# Patient Record
Sex: FEMALE | Race: WHITE | NOT HISPANIC OR LATINO | Employment: OTHER | URBAN - METROPOLITAN AREA
[De-identification: names, ages, dates, MRNs, and addresses within clinical notes are randomized per-mention and may not be internally consistent; named-entity substitution may affect disease eponyms.]

---

## 2017-01-09 ENCOUNTER — HOSPITAL ENCOUNTER (OUTPATIENT)
Dept: INFUSION THERAPY | Facility: HOSPITAL | Age: 62
Discharge: HOME | End: 2017-01-09
Admitting: PLASTIC SURGERY

## 2017-01-09 VITALS
TEMPERATURE: 97.5 F | SYSTOLIC BLOOD PRESSURE: 118 MMHG | DIASTOLIC BLOOD PRESSURE: 68 MMHG | HEART RATE: 95 BPM | OXYGEN SATURATION: 98 % | RESPIRATION RATE: 16 BRPM

## 2017-01-09 DIAGNOSIS — M67.441 GANGLION CYST OF FINGER OF RIGHT HAND: ICD-10-CM

## 2017-01-09 PROCEDURE — 88304 TISSUE EXAM BY PATHOLOGIST: CPT | Performed by: PLASTIC SURGERY

## 2017-01-09 RX ORDER — LIDOCAINE HYDROCHLORIDE 10 MG/ML
20 INJECTION, SOLUTION INFILTRATION; PERINEURAL ONCE
Status: COMPLETED | OUTPATIENT
Start: 2017-01-09 | End: 2017-01-09

## 2017-01-09 RX ADMIN — LIDOCAINE HYDROCHLORIDE 4.5 ML: 10 INJECTION, SOLUTION INFILTRATION; PERINEURAL at 15:54

## 2017-01-09 NOTE — PATIENT INSTRUCTIONS
MINOR SURGERY DISCHARGE INSTRUCTIONS    IMPORTANT:  The following information will help you return to your best level of health.  Wound Care:  ·  Your dressing may be removed 4 days.    · No cosmetics to incision.  · You may use band aids after dressing is removed.    You may shower:  Tomorrow  Do not get dressing soaking wet.    Apply ice to area 30 minutes on and off for the rest of the day and possibly the next day if helpful.  Elevate area for 24-48 hours to reduce swelling.    If hand:  While walking keep hand higher than your elbow.  When resting keep your hand higher than your heart.      Notify your doctor if you have any of the following:  ·  Signs of infection including:  Fever (increase in temperature), redness at incision site, pus from  incision, red streaks around the incision.  ·  Increase in pain unrelieved by pain medication or Tylenol.  ·  If the part swells, gets cold, or numb.  ·  Large amounts of drainage or bleeding from incision.  Apply pressure constantly for 10 minutes.    Medications:   Following this procedure, you should continue to take all other medications as directed by your primary physician unless otherwise instructed. There have been no changes to the medications you provided us (with the following exceptions, if applicable):   You may use Tylenol or Advil for discomfort.  Activity:   You may increase your activity as tolerated.     You may resume normal activity:    Today  Tomorrow In ______ days   No strenuous activity, lifting or sports Until seen by your Dr. Rober Braga    Call your doctor to make an appointment for:    On (date) 1/23/17  Dr. Castro  Office # [phone]; Hydrocodone tablets or capsules  What is this medicine?  ACETAMINOPHEN; HYDROCODONE (a set a STEPHANIE janelle fen; melanie droe KOE done) is a pain reliever. It is used to treat moderate to severe pain.  This medicine may be used for other purposes; ask your health care provider or pharmacist if you  have questions.  What should I tell my health care provider before I take this medicine?  They need to know if you have any of these conditions:  -brain tumor  -Crohn's disease, inflammatory bowel disease, or ulcerative colitis  -drug abuse or addiction  -head injury  -heart or circulation problems  -if you often drink alcohol  -kidney disease or problems going to the bathroom  -liver disease  -lung disease, asthma, or breathing problems  -an unusual or allergic reaction to acetaminophen, hydrocodone, other opioid analgesics, other medicines, foods, dyes, or preservatives  -pregnant or trying to get pregnant  -breast-feeding  How should I use this medicine?  Take this medicine by mouth. Swallow it with a full glass of water. Follow the directions on the prescription label. If the medicine upsets your stomach, take the medicine with food or milk. Do not take more than you are told to take.  Talk to your pediatrician regarding the use of this medicine in children. This medicine is not approved for use in children.  Patients over 65 years may have a stronger reaction and need a smaller dose.  Overdosage: If you think you have taken too much of this medicine contact a poison control center or emergency room at once.  NOTE: This medicine is only for you. Do not share this medicine with others.  What if I miss a dose?  If you miss a dose, take it as soon as you can. If it is almost time for your next dose, take only that dose. Do not take double or extra doses.  What may interact with this medicine?  -alcohol  -antihistamines  -isoniazid  -medicines for depression, anxiety, or psychotic disturbances  -medicines for sleep  -muscle relaxants  -naltrexone  -narcotic medicines (opiates) for pain  -phenobarbital  -ritonavir  -tramadol  This list may not describe all possible interactions. Give your health care provider a list of all the medicines, herbs, non-prescription drugs, or dietary supplements you use. Also tell them if  you smoke, drink alcohol, or use illegal drugs. Some items may interact with your medicine.  What should I watch for while using this medicine?  Tell your doctor or health care professional if your pain does not go away, if it gets worse, or if you have new or a different type of pain. You may develop tolerance to the medicine. Tolerance means that you will need a higher dose of the medicine for pain relief. Tolerance is normal and is expected if you take the medicine for a long time.  Do not suddenly stop taking your medicine because you may develop a severe reaction. Your body becomes used to the medicine. This does NOT mean you are addicted. Addiction is a behavior related to getting and using a drug for a non-medical reason. If you have pain, you have a medical reason to take pain medicine. Your doctor will tell you how much medicine to take. If your doctor wants you to stop the medicine, the dose will be slowly lowered over time to avoid any side effects.  You may get drowsy or dizzy when you first start taking the medicine or change doses. Do not drive, use machinery, or do anything that may be dangerous until you know how the medicine affects you. Stand or sit up slowly.  There are different types of narcotic medicines (opiates) for pain. If you take more than one type at the same time, you may have more side effects. Give your health care provider a list of all medicines you use. Your doctor will tell you how much medicine to take. Do not take more medicine than directed. Call emergency for help if you have problems breathing.  The medicine will cause constipation. Try to have a bowel movement at least every 2 to 3 days. If you do not have a bowel movement for 3 days, call your doctor or health care professional.  Too much acetaminophen can be very dangerous. Do not take Tylenol (acetaminophen) or medicines that contain acetaminophen with this medicine. Many non-prescription medicines contain acetaminophen.  Always read the labels carefully.  What side effects may I notice from receiving this medicine?  Side effects that you should report to your doctor or health care professional as soon as possible:  -allergic reactions like skin rash, itching or hives, swelling of the face, lips, or tongue  -breathing problems  -confusion  -feeling faint or lightheaded, falls  -stomach pain  -yellowing of the eyes or skin  Side effects that usually do not require medical attention (report to your doctor or health care professional if they continue or are bothersome):  -nausea, vomiting  -stomach upset  This list may not describe all possible side effects. Call your doctor for medical advice about side effects. You may report side effects to FDA at 4-327-FDA-7738.  Where should I keep my medicine?  Keep out of the reach of children. This medicine can be abused. Keep your medicine in a safe place to protect it from theft. Do not share this medicine with anyone. Selling or giving away this medicine is dangerous and against the law.  This medicine may cause accidental overdose and death if it taken by other adults, children, or pets. Mix any unused medicine with a substance like cat litter or coffee grounds. Then throw the medicine away in a sealed container like a sealed bag or a coffee can with a lid. Do not use the medicine after the expiration date.  Store at room temperature between 15 and 30 degrees C (59 and 86 degrees F).  NOTE: This sheet is a summary. It may not cover all possible information. If you have questions about this medicine, talk to your doctor, pharmacist, or health care provider.     © 2016, [employer]vier/[employer] Standard. (2015-11-18 15:29:20)

## 2017-01-09 NOTE — IP AVS SNAPSHOT
Gwendolyn Huddleston   1/9/2017  4:00 PM   Surgery    Provider:  ANGELICA GRANADOS [facility]   Department:  [facility]   Dept Phone:  [phone]                Your Full Care Plan           Instructions    MINOR SURGERY DISCHARGE INSTRUCTIONS    IMPORTANT:  The following information will help you return to your best level of health.  Wound Care:  ·  Your dressing may be removed 4 days.    · No cosmetics to incision.  · You may use band aids after dressing is removed.    You may shower:  Tomorrow  Do not get dressing soaking wet.    Apply ice to area 30 minutes on and off for the rest of the day and possibly the next day if helpful.  Elevate area for 24-48 hours to reduce swelling.    If hand:  While walking keep hand higher than your elbow.  When resting keep your hand higher than your heart.      Notify your doctor if you have any of the following:  ·  Signs of infection including:  Fever (increase in temperature), redness at incision site, pus from  incision, red streaks around the incision.  ·  Increase in pain unrelieved by pain medication or Tylenol.  ·  If the part swells, gets cold, or numb.  ·  Large amounts of drainage or bleeding from incision.  Apply pressure constantly for 10 minutes.    Medications:   Following this procedure, you should continue to take all other medications as directed by your primary physician unless otherwise instructed. There have been no changes to the medications you provided us (with the following exceptions, if applicable):   You may use Tylenol or Advil for discomfort.  Activity:   You may increase your activity as tolerated.     You may resume normal activity:    Today  Tomorrow In ______ days   No strenuous activity, lifting or sports Until seen by your Dr. Rober Braga    Call your doctor to make an appointment for:    On (date) 1/23/17  Dr. Castro  Office # [phone]; Hydrocodone tablets or capsules  What is this  medicine?  ACETAMINOPHEN; HYDROCODONE (a set a STEPHANIE janelle fen; melanie droe KOE done) is a pain reliever. It is used to treat moderate to severe pain.  This medicine may be used for other purposes; ask your health care provider or pharmacist if you have questions.  What should I tell my health care provider before I take this medicine?  They need to know if you have any of these conditions:  -brain tumor  -Crohn's disease, inflammatory bowel disease, or ulcerative colitis  -drug abuse or addiction  -head injury  -heart or circulation problems  -if you often drink alcohol  -kidney disease or problems going to the bathroom  -liver disease  -lung disease, asthma, or breathing problems  -an unusual or allergic reaction to acetaminophen, hydrocodone, other opioid analgesics, other medicines, foods, dyes, or preservatives  -pregnant or trying to get pregnant  -breast-feeding  How should I use this medicine?  Take this medicine by mouth. Swallow it with a full glass of water. Follow the directions on the prescription label. If the medicine upsets your stomach, take the medicine with food or milk. Do not take more than you are told to take.  Talk to your pediatrician regarding the use of this medicine in children. This medicine is not approved for use in children.  Patients over 65 years may have a stronger reaction and need a smaller dose.  Overdosage: If you think you have taken too much of this medicine contact a poison control center or emergency room at once.  NOTE: This medicine is only for you. Do not share this medicine with others.  What if I miss a dose?  If you miss a dose, take it as soon as you can. If it is almost time for your next dose, take only that dose. Do not take double or extra doses.  What may interact with this medicine?  -alcohol  -antihistamines  -isoniazid  -medicines for depression, anxiety, or psychotic disturbances  -medicines for sleep  -muscle relaxants  -naltrexone  -narcotic medicines (opiates)  for pain  -phenobarbital  -ritonavir  -tramadol  This list may not describe all possible interactions. Give your health care provider a list of all the medicines, herbs, non-prescription drugs, or dietary supplements you use. Also tell them if you smoke, drink alcohol, or use illegal drugs. Some items may interact with your medicine.  What should I watch for while using this medicine?  Tell your doctor or health care professional if your pain does not go away, if it gets worse, or if you have new or a different type of pain. You may develop tolerance to the medicine. Tolerance means that you will need a higher dose of the medicine for pain relief. Tolerance is normal and is expected if you take the medicine for a long time.  Do not suddenly stop taking your medicine because you may develop a severe reaction. Your body becomes used to the medicine. This does NOT mean you are addicted. Addiction is a behavior related to getting and using a drug for a non-medical reason. If you have pain, you have a medical reason to take pain medicine. Your doctor will tell you how much medicine to take. If your doctor wants you to stop the medicine, the dose will be slowly lowered over time to avoid any side effects.  You may get drowsy or dizzy when you first start taking the medicine or change doses. Do not drive, use machinery, or do anything that may be dangerous until you know how the medicine affects you. Stand or sit up slowly.  There are different types of narcotic medicines (opiates) for pain. If you take more than one type at the same time, you may have more side effects. Give your health care provider a list of all medicines you use. Your doctor will tell you how much medicine to take. Do not take more medicine than directed. Call emergency for help if you have problems breathing.  The medicine will cause constipation. Try to have a bowel movement at least every 2 to 3 days. If you do not have a bowel movement for 3 days,  call your doctor or health care professional.  Too much acetaminophen can be very dangerous. Do not take Tylenol (acetaminophen) or medicines that contain acetaminophen with this medicine. Many non-prescription medicines contain acetaminophen. Always read the labels carefully.  What side effects may I notice from receiving this medicine?  Side effects that you should report to your doctor or health care professional as soon as possible:  -allergic reactions like skin rash, itching or hives, swelling of the face, lips, or tongue  -breathing problems  -confusion  -feeling faint or lightheaded, falls  -stomach pain  -yellowing of the eyes or skin  Side effects that usually do not require medical attention (report to your doctor or health care professional if they continue or are bothersome):  -nausea, vomiting  -stomach upset  This list may not describe all possible side effects. Call your doctor for medical advice about side effects. You may report side effects to FDA at 6-554-FDA-8654.  Where should I keep my medicine?  Keep out of the reach of children. This medicine can be abused. Keep your medicine in a safe place to protect it from theft. Do not share this medicine with anyone. Selling or giving away this medicine is dangerous and against the law.  This medicine may cause accidental overdose and death if it taken by other adults, children, or pets. Mix any unused medicine with a substance like cat litter or coffee grounds. Then throw the medicine away in a sealed container like a sealed bag or a coffee can with a lid. Do not use the medicine after the expiration date.  Store at room temperature between 15 and 30 degrees C (59 and 86 degrees F).  NOTE: This sheet is a summary. It may not cover all possible information. If you have questions about this medicine, talk to your doctor, pharmacist, or health care provider.     © 2016, Elsevier/Gold Standard. (2015-11-18 15:29:20)            Your Updated Medication List       ASK your doctor about these medications     levothyroxine 88 MCG tablet   Commonly known as:  SYNTHROID, LEVOTHROID   Take 1 tablet by mouth daily.       LORazepam 0.5 MG tablet   Commonly known as:  ATIVAN   Take 1 tablet by mouth Daily As Needed for anxiety.               [employer] Signup     [facility] IPXI allows you to send messages to your doctor, view your test results, renew your prescriptions, schedule appointments, and more. To sign up, go to [employer] and click on the Sign Up Now link in the New User? box. Enter your IPXI Activation Code exactly as it appears below along with the last four digits of your Social Security Number and your Date of Birth () to complete the sign-up process. If you do not sign up before the expiration date, you must request a new code.    IPXI Activation Code: [email or web address]  Expires: 2017  4:16 PM    If you have questions, you can email [email or web address] or call [phone] to talk to our IPXI staff. Remember, IPXI is NOT to be used for urgent needs. For medical emergencies, dial 911.               Other Info from Your Visit           Allergies     Penicillins       Reason for Visit     Procedure           Vital Signs     Blood Pressure Pulse Temperature Respirations Oxygen Saturation Smoking Status    118/68 (BP Location: Left arm, Patient Position: Sitting, Cuff Size: Adult) 95 97.5 °F (36.4 °C) (Oral) 16 98% Former Smoker      Medications Administered     lidocaine (XYLOCAINE) 1 % injection 20 mL                    Discharge Instructions     None         SYMPTOMS OF A STROKE    Call 911 or have someone take you to the Emergency Department if you have any of the following:    · Sudden numbness or weakness of your face, arm or leg especially on one side of the body  · Sudden confusion, diffiiculty speaking or trouble understanding   · Changes in your vision or loss of sight in one eye  · Sudden severe headache with  no known cause  · sudden dizziness, trouble walking, loss of balance or coordination    It is important to seek emergency care right away if you have further stroke symptoms. If you get emergency help quickly, the powerful clot-dissolving medicines can reduce the disabilities caused by a stroke.     For more information:    American Stroke Association  7-022-0-STROKE  www.strokeassociation.org           IF YOU SMOKE OR USE TOBACCO PLEASE READ THE FOLLOWING:    Why is smoking bad for me?  Smoking increases the risk of heart disease, lung disease, vascular disease, stroke, and cancer.     If you smoke, STOP!    If you would like more information on quitting smoking, please visit the [email or web address] website: [email or web address]   This link will provide additional resources including the QUIT line and the [employer] the [employer] support groups.     For more information:    American Cancer Society  [phone] 520-2119    American Heart Association  1-461.970.5072

## 2017-01-11 LAB
CYTO UR: NORMAL
LAB AP CASE REPORT: NORMAL
Lab: NORMAL
PATH REPORT.FINAL DX SPEC: NORMAL
PATH REPORT.GROSS SPEC: NORMAL

## 2017-01-13 NOTE — OP NOTE
Date of Procedure:  01/09/2017     INDICATION: The patient is a 61-year-old lady with a painful ganglion cyst on the dorsum of the distal interphalangeal joint of the right middle finger.     SURGEON: Dwayne Castro MD     ANESTHESIA: Xylocaine 1% plain as a digital block to the right middle finger.     PREOPERATIVE DIAGNOSIS: Ganglion cyst, right middle finger distal interphalangeal joint.     PROCEDURE PERFORMED: Excision of ganglion cyst, right middle finger, 0.5 cm.     DESCRIPTION OF PROCEDURE: Following installation of the digital block good anesthesia was achieved. The hand was prepped with Hibiclens and draped in a sterile fashion. A Tourni-cot was rolled onto the right middle finger to exsanguinate it and provide a bloodless field. A Clarence incision was made on the dorsum of the DIP joint and the skin dissected away from the ganglion cyst which was then excised from the germinal matrix. The base of the wound around the DIP joint was cauterized and care was taken not to damage the germinal matrix. The skin was closed with 5-0 nylon and dressed with bacitracin ointment, sterile gauze, and Elastikon tape.     PLAN: Home today with the hand elevated and instructions to leave the dressings intact and undisturbed and keep dry. Follow up in the office next week.       Dwayne Castro M.D.  GD:ab  D:   01/13/2017 11:04:21  T:   01/13/2017 18:09:52  Job ID:   [id number]  Document ID:   [id number]  cc:

## 2017-04-24 RX ORDER — LEVOTHYROXINE SODIUM 88 MCG
TABLET ORAL
Qty: 90 TABLET | Refills: 1 | Status: SHIPPED | OUTPATIENT
Start: 2017-04-24 | End: 2017-06-16 | Stop reason: SDUPTHER

## 2017-05-30 ENCOUNTER — OFFICE VISIT (OUTPATIENT)
Dept: FAMILY MEDICINE CLINIC | Facility: CLINIC | Age: 62
End: 2017-05-30

## 2017-05-30 VITALS
WEIGHT: 133.4 LBS | SYSTOLIC BLOOD PRESSURE: 106 MMHG | OXYGEN SATURATION: 98 % | BODY MASS INDEX: 23.64 KG/M2 | HEART RATE: 72 BPM | DIASTOLIC BLOOD PRESSURE: 72 MMHG | TEMPERATURE: 97.5 F | HEIGHT: 63 IN

## 2017-05-30 DIAGNOSIS — R10.9 RIGHT SIDED ABDOMINAL PAIN: Primary | ICD-10-CM

## 2017-05-30 PROCEDURE — 99213 OFFICE O/P EST LOW 20 MIN: CPT | Performed by: FAMILY MEDICINE

## 2017-06-02 ENCOUNTER — OFFICE VISIT (OUTPATIENT)
Dept: RETAIL CLINIC | Facility: CLINIC | Age: 62
End: 2017-06-02

## 2017-06-02 VITALS
RESPIRATION RATE: 18 BRPM | DIASTOLIC BLOOD PRESSURE: 70 MMHG | OXYGEN SATURATION: 97 % | TEMPERATURE: 98.5 F | HEART RATE: 86 BPM | SYSTOLIC BLOOD PRESSURE: 108 MMHG

## 2017-06-02 DIAGNOSIS — B02.9 HERPES ZOSTER WITHOUT COMPLICATION: Primary | ICD-10-CM

## 2017-06-02 PROCEDURE — 99213 OFFICE O/P EST LOW 20 MIN: CPT | Performed by: NURSE PRACTITIONER

## 2017-06-02 RX ORDER — ACYCLOVIR 400 MG/1
400 TABLET ORAL 3 TIMES DAILY
Qty: 15 TABLET | Refills: 0 | Status: SHIPPED | OUTPATIENT
Start: 2017-06-02 | End: 2017-06-07

## 2017-06-02 NOTE — PROGRESS NOTES
Subjective:     Gwendolyn Huddleston is a 61 y.o.     Rash   This is a new problem. The current episode started 1 to 4 weeks ago. The affected locations include the chest (right side). The rash is characterized by blistering. Associated symptoms include rhinorrhea. Pertinent negatives include no congestion, cough, diarrhea, fever, shortness of breath or sore throat. Treatments tried: neosporin.         The following portions of the patient's history were reviewed and updated as appropriate: allergies, current medications, past family history, past medical history, past social history, past surgical history and problem list.      Review of Systems   Constitutional: Negative for appetite change and fever.   HENT: Positive for rhinorrhea. Negative for congestion and sore throat.    Respiratory: Negative for cough, shortness of breath and wheezing.    Cardiovascular: Negative.    Gastrointestinal: Negative for diarrhea and nausea.   Musculoskeletal: Negative for myalgias.   Skin: Positive for rash (breast, right sided, does not really itch, one on side tender).   Neurological: Positive for light-headedness. Negative for dizziness and headaches.         Objective:      Physical Exam   Constitutional: She is oriented to person, place, and time. She appears well-developed and well-nourished. She is cooperative.   HENT:   Head: Normocephalic and atraumatic.   Eyes: EOM are normal. Pupils are equal, round, and reactive to light.   Neck: Normal range of motion. Neck supple.   Cardiovascular: Normal rate, regular rhythm, S1 normal, S2 normal and normal heart sounds.    Pulmonary/Chest: Effort normal and breath sounds normal.   Abdominal: Soft. Normal appearance and bowel sounds are normal. There is no tenderness.   Musculoskeletal: Normal range of motion.   Neurological: She is alert and oriented to person, place, and time.   Skin: Skin is warm and dry. Rash noted.   Macopapular rash on right breast wraps around to side some with  vesicles noted   Psychiatric: She has a normal mood and affect. Her speech is normal and behavior is normal. Thought content normal.   Vitals reviewed.          Gwendolyn was seen today for rash.    Diagnoses and all orders for this visit:    Herpes zoster without complication    Other orders  -     acyclovir (ZOVIRAX) 400 MG tablet; Take 1 tablet by mouth 3 (Three) Times a Day for 5 days. Take no more than 5 doses a day.

## 2017-06-02 NOTE — PATIENT INSTRUCTIONS
Shingles  Shingles, which is also known as herpes zoster, is an infection that causes a painful skin rash and fluid-filled blisters. Shingles is not related to genital herpes, which is a sexually transmitted infection.   Shingles only develops in people who:  · Have had chickenpox.  · Have received the chickenpox vaccine. (This is rare.)  CAUSES  Shingles is caused by varicella-zoster virus (VZV). This is the same virus that causes chickenpox. After exposure to VZV, the virus stays in the body in an inactive (dormant) state. Shingles develops if the virus reactivates. This can happen many years after the initial exposure to VZV. It is not known what causes this virus to reactivate.  RISK FACTORS  People who have had chickenpox or received the chickenpox vaccine are at risk for shingles. Infection is more common in people who:  · Are older than age 50.  · Have a weakened defense (immune) system, such as those with HIV, AIDS, or cancer.  · Are taking medicines that weaken the immune system, such as transplant medicines.  · Are under great stress.  SYMPTOMS  Early symptoms of this condition include itching, tingling, and pain in an area on your skin. Pain may be described as burning, stabbing, or throbbing.  A few days or weeks after symptoms start, a painful red rash appears, usually on one side of the body in a bandlike or beltlike pattern. The rash eventually turns into fluid-filled blisters that break open, scab over, and dry up in about 2-3 weeks.  At any time during the infection, you may also develop:  · A fever.  · Chills.  · A headache.  · An upset stomach.  DIAGNOSIS  This condition is diagnosed with a skin exam. Sometimes, skin or fluid samples are taken from the blisters before a diagnosis is made. These samples are examined under a microscope or sent to a lab for testing.  TREATMENT  There is no specific cure for this condition. Your health care provider will probably prescribe medicines to help you manage  pain, recover more quickly, and avoid long-term problems. Medicines may include:  · Antiviral drugs.  · Anti-inflammatory drugs.  · Pain medicines.  If the area involved is on your face, you may be referred to a specialist, such as an eye doctor (ophthalmologist) or an ear, nose, and throat (ENT) doctor to help you avoid eye problems, chronic pain, or disability.  HOME CARE INSTRUCTIONS  Medicines  · Take medicines only as directed by your health care provider.  · Apply an anti-itch or numbing cream to the affected area as directed by your health care provider.  Blister and Rash Care  · Take a cool bath or apply cool compresses to the area of the rash or blisters as directed by your health care provider. This may help with pain and itching.  · Keep your rash covered with a loose bandage (dressing). Wear loose-fitting clothing to help ease the pain of material rubbing against the rash.  · Keep your rash and blisters clean with mild soap and cool water or as directed by your health care provider.  · Check your rash every day for signs of infection. These include redness, swelling, and pain that lasts or increases.  · Do not pick your blisters.  · Do not scratch your rash.  General Instructions  · Rest as directed by your health care provider.  · Keep all follow-up visits as directed by your health care provider. This is important.  · Until your blisters scab over, your infection can cause chickenpox in people who have never had it or been vaccinated against it. To prevent this from happening, avoid contact with other people, especially:    Babies.    Pregnant women.    Children who have eczema.    Elderly people who have transplants.    People who have chronic illnesses, such as leukemia or AIDS.  SEEK MEDICAL CARE IF:  · Your pain is not relieved with prescribed medicines.  · Your pain does not get better after the rash heals.  · Your rash looks infected. Signs of infection include redness, swelling, and pain that  lasts or increases.  SEEK IMMEDIATE MEDICAL CARE IF:  · The rash is on your face or nose.  · You have facial pain, pain around your eye area, or loss of feeling on one side of your face.  · You have ear pain or you have ringing in your ear.  · You have loss of taste.  · Your condition gets worse.     This information is not intended to replace advice given to you by your health care provider. Make sure you discuss any questions you have with your health care provider.     Document Released: 12/18/2006 Document Revised: 04/10/2017 Document Reviewed: 10/29/2015  [employer] Interactive Patient Education ©2017 [employer]

## 2017-06-08 ENCOUNTER — HOSPITAL ENCOUNTER (OUTPATIENT)
Dept: ULTRASOUND IMAGING | Facility: HOSPITAL | Age: 62
Discharge: HOME | End: 2017-06-08
Attending: FAMILY MEDICINE

## 2017-06-08 ENCOUNTER — HOSPITAL ENCOUNTER (OUTPATIENT)
Dept: CT IMAGING | Facility: HOSPITAL | Age: 62
Discharge: HOME | End: 2017-06-08
Attending: FAMILY MEDICINE | Admitting: FAMILY MEDICINE

## 2017-06-08 LAB — CREAT BLDA-MCNC: 0.7 MG/DL (ref 0.6–1.3)

## 2017-06-08 PROCEDURE — 82565 ASSAY OF CREATININE: CPT

## 2017-06-08 PROCEDURE — 74177 CT ABD & PELVIS W/CONTRAST: CPT

## 2017-06-08 PROCEDURE — 0 IOPAMIDOL 61 % SOLUTION: Performed by: FAMILY MEDICINE

## 2017-06-08 PROCEDURE — 76705 ECHO EXAM OF ABDOMEN: CPT

## 2017-06-08 RX ADMIN — IOPAMIDOL 85 ML: 612 INJECTION, SOLUTION INTRAVENOUS at 08:46

## 2017-06-09 DIAGNOSIS — K82.4 GALLBLADDER POLYP: Primary | ICD-10-CM

## 2017-06-12 ENCOUNTER — TELEPHONE (OUTPATIENT)
Dept: FAMILY MEDICINE CLINIC | Facility: CLINIC | Age: 62
End: 2017-06-12

## 2017-06-12 NOTE — TELEPHONE ENCOUNTER
----- Message from Bee Pemberton sent at 6/12/2017 11:12 AM EDT -----    Results of ct scan of abd.                 [phone]  lov 5/30/17

## 2017-06-13 ENCOUNTER — OFFICE VISIT (OUTPATIENT)
Dept: SURGERY | Facility: CLINIC | Age: 62
End: 2017-06-13

## 2017-06-13 VITALS — WEIGHT: 132.2 LBS | HEIGHT: 63 IN | OXYGEN SATURATION: 96 % | HEART RATE: 75 BPM | BODY MASS INDEX: 23.42 KG/M2

## 2017-06-13 DIAGNOSIS — K82.4 GALLBLADDER POLYP: Primary | ICD-10-CM

## 2017-06-13 PROCEDURE — 99203 OFFICE O/P NEW LOW 30 MIN: CPT | Performed by: SURGERY

## 2017-06-13 NOTE — PROGRESS NOTES
General Surgery  Initial Office Visit    CC: Gallbladder polyps    HPI: The patient is a pleasant 61 y.o. year-old lady who presents today for evaluation of recently discovered polyps within her gallbladder.  She was having some right upper quadrant abdominal discomfort that was occurring daily and intermittent in nature that she describes as a pressure underneath her right rib cage.  The pain occurred spontaneously, and seems to be improved with external pressure application and also with walking but had no clear association with any by mouth intake.  She denies any nausea, vomiting, fevers, or chills but has had some night sweats persistently since a hysterectomy in 1999.  Both her mother and father have her gallbladder removed for unknown reasons.  She had an ultrasound of her gallbladder as well as a CT of the abdomen and pelvis that both demonstrated multiple gallbladder polyps, with the largest measuring 6 mm in size on the ultrasound.  She has a personal history of melanoma of the right chest wall that was removed with sequential shave biopsies by her dermatologist, and was very concerned that her symptoms of right upper quadrant abdominal discomfort may have been related to bony metastases of her melanoma and this was why she was evaluated by her primary care physician further.    Past Medical History: Melanoma of the right chest wall, shingles, hypothyroidism, periprocedural bleeding    Past Surgical History: Colonoscopy (2010), hysterectomy (1999), shave removal of melanoma from right chest wall, anal sphincteroplasty (1984), bilateral lower extremity venous ablation    Medications: Levothyroxine 88 µg daily, Ativan 0.5 mg as needed for anxiety    Allergies: Penicillins (hives)    Family History: Mother and father with gallbladder issues requiring cholecystectomy, father with prostate cancer, daughter with bleeding disorder (questionable ITP)    Social History: , nonsmoker, social alcohol use,  retired    ROS:   Constitutional: Positive for night sweats; Negative for fevers or chills  HENT: Negative for hearing loss or runny nose  Eyes: Negative for vision changes or scleral icterus  Respiratory: Negative for cough or shortness of breath  Cardiovascular: Negative for chest pain or heart palpitations  Gastrointestinal: Positive for abdominal pain; Negative for nausea, vomiting, constipation, melena, or hematochezia  Genitourinary: Negative for hematuria or dysuria  Musculoskeletal: Positive for neck pain; Negative for joint pain or back pain  Neurologic: Negative for headaches or dizziness  Psychiatric: Negative for anxiety or depression  Skin: Positive for recent rash (shingles); negative for nonhealing wounds or pallor  All other systems reviewed and negative    Physical Exam:  Vitals:    06/13/17 0814   Pulse: 75   SpO2: 96%     General: No acute distress, well-nourished & well-developed  HEAD: normocephalic, atraumatic  EYES: normal conjunctiva, sclera anicteric  EARS: grossly normal hearing  NECK: supple, no thyromegaly  CARDIOVASCULAR: regular rate and rhythm  RESPIRATORY: clear to auscultation bilaterally  GASTROINTESTINAL: soft, nontender, non-distended  MUSCULOSKELETAL: normal gait and station. No gross extremity abnormalities  PSYCHIATRIC: oriented x3, normal mood and affect    IMAGING:  RUQ ULTRASOUND:  IMPRESSION:  Multiple gallbladder polyps as described. Surgical consultation for consideration of surgical removal of the gallbladder is suggested.    CT ABDOMEN/PELVIS:  CONCLUSION:  1. Tiny gallbladder polyp is reidentified.   2. Nominal sigmoid diverticulosis.  3. No acute intra-abdominal abnormality or indication of metastatic disease.    ASSESSMENT & PLAN  Mrs. Huddleston is a 61 year-old lady with multiple gallbladder polyps Measuring 6 mm in largest diameter.  Given the overall number of polyps, and increased risk for malignancy with polyps over 1 cm I have recommended she consider  cholecystectomy before any of the numerous polyps have the opportunity to increase in size.  She is very interested in pursuing cholecystectomy at this time given her personal history of melanoma and anxiety about possible cancer.  We discussed the risks of the procedure to include bleeding, possible postoperative bile leak, and possible common bile duct injury.  Despite these risks, she has consented to proceed and I'm happy to get this scheduled for her at her earliest convenience.  She does have a questionable history of bleeding with prior surgeries on her bilateral lower chin any veins and her daughter has a possible history of ITP.  For this reason I will follow-up on her platelet count with her preadmission testing.    Jessica Cannon MD  General and Endoscopic Surgery  [facility]    [address], [address]  [place], [place], [address]  P: [phone]  F: [phone]

## 2017-06-16 ENCOUNTER — TELEPHONE (OUTPATIENT)
Dept: FAMILY MEDICINE CLINIC | Facility: CLINIC | Age: 62
End: 2017-06-16

## 2017-06-16 ENCOUNTER — APPOINTMENT (OUTPATIENT)
Dept: PREADMISSION TESTING | Facility: HOSPITAL | Age: 62
End: 2017-06-16

## 2017-06-16 VITALS
SYSTOLIC BLOOD PRESSURE: 99 MMHG | DIASTOLIC BLOOD PRESSURE: 68 MMHG | HEIGHT: 63 IN | HEART RATE: 77 BPM | OXYGEN SATURATION: 98 % | BODY MASS INDEX: 23.57 KG/M2 | TEMPERATURE: 97.9 F | WEIGHT: 133 LBS | RESPIRATION RATE: 16 BRPM

## 2017-06-16 LAB
ALBUMIN SERPL-MCNC: 4.4 G/DL (ref 3.5–5.2)
ALBUMIN/GLOB SERPL: 1.6 G/DL
ALP SERPL-CCNC: 48 U/L (ref 39–117)
ALT SERPL W P-5'-P-CCNC: 14 U/L (ref 1–33)
ANION GAP SERPL CALCULATED.3IONS-SCNC: 12.8 MMOL/L
AST SERPL-CCNC: 18 U/L (ref 1–32)
BASOPHILS # BLD AUTO: 0.07 10*3/MM3 (ref 0–0.2)
BASOPHILS NFR BLD AUTO: 0.9 % (ref 0–1.5)
BILIRUB SERPL-MCNC: 0.5 MG/DL (ref 0.1–1.2)
BUN BLD-MCNC: 21 MG/DL (ref 8–23)
BUN/CREAT SERPL: 26.6 (ref 7–25)
CALCIUM SPEC-SCNC: 9.4 MG/DL (ref 8.6–10.5)
CHLORIDE SERPL-SCNC: 102 MMOL/L (ref 98–107)
CO2 SERPL-SCNC: 23.2 MMOL/L (ref 22–29)
CREAT BLD-MCNC: 0.79 MG/DL (ref 0.57–1)
DEPRECATED RDW RBC AUTO: 44.4 FL (ref 37–54)
EOSINOPHIL # BLD AUTO: 0.44 10*3/MM3 (ref 0–0.7)
EOSINOPHIL NFR BLD AUTO: 5.4 % (ref 0.3–6.2)
ERYTHROCYTE [DISTWIDTH] IN BLOOD BY AUTOMATED COUNT: 13 % (ref 11.7–13)
GFR SERPL CREATININE-BSD FRML MDRD: 74 ML/MIN/1.73
GLOBULIN UR ELPH-MCNC: 2.7 GM/DL
GLUCOSE BLD-MCNC: 85 MG/DL (ref 65–99)
HCT VFR BLD AUTO: 41.5 % (ref 35.6–45.5)
HGB BLD-MCNC: 13.5 G/DL (ref 11.9–15.5)
IMM GRANULOCYTES # BLD: 0 10*3/MM3 (ref 0–0.03)
IMM GRANULOCYTES NFR BLD: 0 % (ref 0–0.5)
LYMPHOCYTES # BLD AUTO: 3.15 10*3/MM3 (ref 0.9–4.8)
LYMPHOCYTES NFR BLD AUTO: 38.9 % (ref 19.6–45.3)
MCH RBC QN AUTO: 30.5 PG (ref 26.9–32)
MCHC RBC AUTO-ENTMCNC: 32.5 G/DL (ref 32.4–36.3)
MCV RBC AUTO: 93.9 FL (ref 80.5–98.2)
MONOCYTES # BLD AUTO: 0.59 10*3/MM3 (ref 0.2–1.2)
MONOCYTES NFR BLD AUTO: 7.3 % (ref 5–12)
NEUTROPHILS # BLD AUTO: 3.85 10*3/MM3 (ref 1.9–8.1)
NEUTROPHILS NFR BLD AUTO: 47.5 % (ref 42.7–76)
PLATELET # BLD AUTO: 231 10*3/MM3 (ref 140–500)
PMV BLD AUTO: 10.8 FL (ref 6–12)
POTASSIUM BLD-SCNC: 4 MMOL/L (ref 3.5–5.2)
PROT SERPL-MCNC: 7.1 G/DL (ref 6–8.5)
RBC # BLD AUTO: 4.42 10*6/MM3 (ref 3.9–5.2)
SODIUM BLD-SCNC: 138 MMOL/L (ref 136–145)
WBC NRBC COR # BLD: 8.1 10*3/MM3 (ref 4.5–10.7)

## 2017-06-16 PROCEDURE — 93005 ELECTROCARDIOGRAM TRACING: CPT

## 2017-06-16 PROCEDURE — 85025 COMPLETE CBC W/AUTO DIFF WBC: CPT | Performed by: SURGERY

## 2017-06-16 PROCEDURE — 93010 ELECTROCARDIOGRAM REPORT: CPT | Performed by: INTERNAL MEDICINE

## 2017-06-16 PROCEDURE — 80053 COMPREHEN METABOLIC PANEL: CPT | Performed by: SURGERY

## 2017-06-16 PROCEDURE — 36415 COLL VENOUS BLD VENIPUNCTURE: CPT | Performed by: SURGERY

## 2017-06-16 RX ORDER — LEVOTHYROXINE SODIUM 88 UG/1
88 TABLET ORAL DAILY
COMMUNITY
End: 2017-11-20 | Stop reason: SDUPTHER

## 2017-06-16 RX ORDER — ACYCLOVIR 400 MG/1
400 TABLET ORAL 3 TIMES DAILY
Qty: 6 TABLET | Refills: 0 | Status: SHIPPED | OUTPATIENT
Start: 2017-06-16 | End: 2017-06-30

## 2017-06-16 NOTE — TELEPHONE ENCOUNTER
----- Message from Carlota Juan sent at 6/16/2017  9:17 AM EDT -----  Ph [phone]    [facility]  Allergic to penicillin  Lsd: 5/30/17     Pt went to urgent care and was diagnosed with shingles, she was given Acyclovir 400mg but they only gave her a few days worth and she wants to know if the dr will call her some more in cause the places have not went away      Last office visit 5/30/17

## 2017-06-16 NOTE — TELEPHONE ENCOUNTER
Urgent care gave pt 5 days worth of acyclovir, so dr. Quinonez had me send in 2 days worth so pt would have total of 7 days worth of medication, rx sent to pharmacy and pt informed

## 2017-06-16 NOTE — DISCHARGE INSTRUCTIONS
PLEASE ARRIVE AT 10:00 AM ON 6/20/2017        Take the following medications the morning of surgery with a small sip of water:        General Instructions:  • Do not eat solid food after midnight the night before surgery.  • You may drink clear liquids day of surgery but must stop at least one hour before your hospital arrival time.  • It is beneficial for you to have a clear drink that contains carbohydrates the day of surgery.  We suggest a 20 ounce bottle of Gatorade or Powerade for non-diabetic patients or a 20 ounce bottle of G2 or Powerade Zero for diabetic patients. (Pediatric patients, are not advised to drink a 20 ounce carbohydrate drink)    Clear liquids are liquids you can see through.  Nothing red in color.     Plain water                               Sports drinks  Sodas                                   Gelatin (Jell-O)  Fruit juices without pulp such as white grape juice and apple juice  Popsicles that contain no fruit or yogurt  Tea or coffee (no cream or milk added)  Gatorade / Powerade  G2 / Powerade Zero    • Infants may have breast milk up to four hours before surgery.  • Infants drinking formula may drink formula up to six hours before surgery.   • Patients who avoid smoking, chewing tobacco and alcohol for 4 weeks prior to surgery have a reduced risk of post-operative complications.  Quit smoking as many days before surgery as you can.  • Do not smoke, use chewing tobacco or drink alcohol the day of surgery.   • If applicable bring your C-PAP/ BI-PAP machine.  • Bring any papers given to you in the doctor’s office.  • Wear clean comfortable clothes and socks.  • Do not wear contact lenses or make-up.  Bring a case for your glasses.   • Bring crutches or walker if applicable.  • Leave all other valuables and jewelry at home.  • The Pre-Admission Testing nurse will instruct you to bring medications if unable to obtain an accurate list in Pre-Admission Testing.            Preventing a Surgical  Site Infection:  • For 2 to 3 days before surgery, avoid shaving with a razor because the razor can irritate skin and make it easier to develop an infection.  • The night prior to surgery sleep in a clean bed with clean clothing.  Do not allow pets to sleep with you.  • Shower on the morning of surgery using a fresh bar of anti-bacterial soap (such as Dial) and clean washcloth.  Dry with a clean towel and dress in clean clothing.  • Ask your surgeon if you will be receiving antibiotics prior to surgery.  • Make sure you, your family, and all healthcare providers clean their hands with soap and water or an alcohol based hand  before caring for you or your wound.    Day of surgery:  Upon arrival, a Pre-op nurse and Anesthesiologist will review your health history, obtain vital signs, and answer questions you may have.  The only belongings needed at this time will be your home medications and if applicable your C-PAP/BI-PAP machine.  If you are staying overnight your family can leave the rest of your belongings in the car and bring them to your room later.  A Pre-op nurse will start an IV and you may receive medication in preparation for surgery, including something to help you relax.  Your family will be able to see you in the Pre-op area.  While you are in surgery your family should notify the waiting room  if they leave the waiting room area and provide a contact phone number.    Please be aware that surgery does come with discomfort.  We want to make every effort to control your discomfort so please discuss any uncontrolled symptoms with your nurse.   Your doctor will most likely have prescribed pain medications.      If you are going home after surgery you will receive individualized written care instructions before being discharged.  A responsible adult must drive you to and from the hospital on the day of your surgery and stay with you for 24 hours.    If you are staying overnight following  surgery, you will be transported to your hospital room following the recovery period.  [facility] has all private rooms.    If you have any questions please call Pre-Admission Testing at [phone].  Deductibles and co-payments are collected on the day of service. Please be prepared to pay the required co-pay, deductible or deposit on the day of service as defined by your plan.

## 2017-06-20 ENCOUNTER — APPOINTMENT (OUTPATIENT)
Dept: GENERAL RADIOLOGY | Facility: HOSPITAL | Age: 62
End: 2017-06-20

## 2017-06-20 ENCOUNTER — ANESTHESIA EVENT (OUTPATIENT)
Dept: PERIOP | Facility: HOSPITAL | Age: 62
End: 2017-06-20

## 2017-06-20 ENCOUNTER — HOSPITAL ENCOUNTER (OUTPATIENT)
Facility: HOSPITAL | Age: 62
Setting detail: OBSERVATION
Discharge: HOME | End: 2017-06-21
Attending: SURGERY | Admitting: SURGERY

## 2017-06-20 ENCOUNTER — ANESTHESIA (OUTPATIENT)
Dept: PERIOP | Facility: HOSPITAL | Age: 62
End: 2017-06-20

## 2017-06-20 DIAGNOSIS — K80.40 CALCULUS OF BILE DUCT WITH CHOLECYSTITIS WITHOUT OBSTRUCTION, UNSPECIFIED CHOLECYSTITIS ACUITY: Primary | ICD-10-CM

## 2017-06-20 DIAGNOSIS — K82.4 GALLBLADDER POLYP: ICD-10-CM

## 2017-06-20 PROBLEM — K80.70 CHOLELITHIASIS WITH CHOLEDOCHOLITHIASIS: Status: ACTIVE | Noted: 2017-06-20

## 2017-06-20 PROCEDURE — G0378 HOSPITAL OBSERVATION PER HR: HCPCS

## 2017-06-20 PROCEDURE — 25010000002 FENTANYL CITRATE (PF) 100 MCG/2ML SOLUTION: Performed by: NURSE ANESTHETIST, CERTIFIED REGISTERED

## 2017-06-20 PROCEDURE — 25010000002 KETOROLAC TROMETHAMINE PER 15 MG: Performed by: NURSE ANESTHETIST, CERTIFIED REGISTERED

## 2017-06-20 PROCEDURE — 47563 LAPARO CHOLECYSTECTOMY/GRAPH: CPT | Performed by: SURGERY

## 2017-06-20 PROCEDURE — 25010000002 ONDANSETRON PER 1 MG: Performed by: NURSE ANESTHETIST, CERTIFIED REGISTERED

## 2017-06-20 PROCEDURE — 25010000002 NEOSTIGMINE PER 0.5 MG: Performed by: NURSE ANESTHETIST, CERTIFIED REGISTERED

## 2017-06-20 PROCEDURE — 25010000002 PROPOFOL 10 MG/ML EMULSION: Performed by: NURSE ANESTHETIST, CERTIFIED REGISTERED

## 2017-06-20 PROCEDURE — 0 IOPAMIDOL PER 1 ML: Performed by: SURGERY

## 2017-06-20 PROCEDURE — 25010000002 MIDAZOLAM PER 1 MG: Performed by: ANESTHESIOLOGY

## 2017-06-20 PROCEDURE — 25010000002 ONDANSETRON PER 1 MG: Performed by: SURGERY

## 2017-06-20 PROCEDURE — 94799 UNLISTED PULMONARY SVC/PX: CPT

## 2017-06-20 PROCEDURE — 88304 TISSUE EXAM BY PATHOLOGIST: CPT | Performed by: SURGERY

## 2017-06-20 PROCEDURE — 47563 LAPARO CHOLECYSTECTOMY/GRAPH: CPT | Performed by: PHYSICIAN ASSISTANT

## 2017-06-20 PROCEDURE — 74300 X-RAY BILE DUCTS/PANCREAS: CPT

## 2017-06-20 RX ORDER — ONDANSETRON 2 MG/ML
INJECTION INTRAMUSCULAR; INTRAVENOUS AS NEEDED
Status: DISCONTINUED | OUTPATIENT
Start: 2017-06-20 | End: 2017-06-20 | Stop reason: SURG

## 2017-06-20 RX ORDER — PROMETHAZINE HYDROCHLORIDE 25 MG/1
25 TABLET ORAL ONCE AS NEEDED
Status: DISCONTINUED | OUTPATIENT
Start: 2017-06-20 | End: 2017-06-20 | Stop reason: HOSPADM

## 2017-06-20 RX ORDER — KETOROLAC TROMETHAMINE 30 MG/ML
INJECTION, SOLUTION INTRAMUSCULAR; INTRAVENOUS AS NEEDED
Status: DISCONTINUED | OUTPATIENT
Start: 2017-06-20 | End: 2017-06-20 | Stop reason: SURG

## 2017-06-20 RX ORDER — BUPIVACAINE HYDROCHLORIDE AND EPINEPHRINE 5; 5 MG/ML; UG/ML
INJECTION, SOLUTION EPIDURAL; INTRACAUDAL; PERINEURAL AS NEEDED
Status: DISCONTINUED | OUTPATIENT
Start: 2017-06-20 | End: 2017-06-20 | Stop reason: HOSPADM

## 2017-06-20 RX ORDER — ONDANSETRON 2 MG/ML
4 INJECTION INTRAMUSCULAR; INTRAVENOUS EVERY 6 HOURS PRN
Status: DISCONTINUED | OUTPATIENT
Start: 2017-06-20 | End: 2017-06-21 | Stop reason: HOSPADM

## 2017-06-20 RX ORDER — GLYCOPYRROLATE 0.2 MG/ML
INJECTION INTRAMUSCULAR; INTRAVENOUS AS NEEDED
Status: DISCONTINUED | OUTPATIENT
Start: 2017-06-20 | End: 2017-06-20 | Stop reason: SURG

## 2017-06-20 RX ORDER — FLUMAZENIL 0.1 MG/ML
0.2 INJECTION INTRAVENOUS AS NEEDED
Status: DISCONTINUED | OUTPATIENT
Start: 2017-06-20 | End: 2017-06-20 | Stop reason: HOSPADM

## 2017-06-20 RX ORDER — ROCURONIUM BROMIDE 10 MG/ML
INJECTION, SOLUTION INTRAVENOUS AS NEEDED
Status: DISCONTINUED | OUTPATIENT
Start: 2017-06-20 | End: 2017-06-20 | Stop reason: SURG

## 2017-06-20 RX ORDER — PROMETHAZINE HYDROCHLORIDE 25 MG/1
12.5 TABLET ORAL ONCE AS NEEDED
Status: DISCONTINUED | OUTPATIENT
Start: 2017-06-20 | End: 2017-06-20 | Stop reason: HOSPADM

## 2017-06-20 RX ORDER — NALOXONE HCL 0.4 MG/ML
0.4 VIAL (ML) INJECTION
Status: DISCONTINUED | OUTPATIENT
Start: 2017-06-20 | End: 2017-06-21 | Stop reason: HOSPADM

## 2017-06-20 RX ORDER — CEFAZOLIN SODIUM 2 G/100ML
2 INJECTION, SOLUTION INTRAVENOUS ONCE
Status: DISCONTINUED | OUTPATIENT
Start: 2017-06-20 | End: 2017-06-20

## 2017-06-20 RX ORDER — EPHEDRINE SULFATE 50 MG/ML
INJECTION, SOLUTION INTRAVENOUS AS NEEDED
Status: DISCONTINUED | OUTPATIENT
Start: 2017-06-20 | End: 2017-06-20 | Stop reason: SURG

## 2017-06-20 RX ORDER — MIDAZOLAM HYDROCHLORIDE 1 MG/ML
1 INJECTION INTRAMUSCULAR; INTRAVENOUS
Status: DISCONTINUED | OUTPATIENT
Start: 2017-06-20 | End: 2017-06-20 | Stop reason: HOSPADM

## 2017-06-20 RX ORDER — LABETALOL HYDROCHLORIDE 5 MG/ML
5 INJECTION, SOLUTION INTRAVENOUS
Status: DISCONTINUED | OUTPATIENT
Start: 2017-06-20 | End: 2017-06-20 | Stop reason: HOSPADM

## 2017-06-20 RX ORDER — SODIUM CHLORIDE 0.9 % (FLUSH) 0.9 %
1-10 SYRINGE (ML) INJECTION AS NEEDED
Status: DISCONTINUED | OUTPATIENT
Start: 2017-06-20 | End: 2017-06-20 | Stop reason: HOSPADM

## 2017-06-20 RX ORDER — PROPOFOL 10 MG/ML
VIAL (ML) INTRAVENOUS AS NEEDED
Status: DISCONTINUED | OUTPATIENT
Start: 2017-06-20 | End: 2017-06-20 | Stop reason: SURG

## 2017-06-20 RX ORDER — ONDANSETRON 2 MG/ML
4 INJECTION INTRAMUSCULAR; INTRAVENOUS ONCE AS NEEDED
Status: DISCONTINUED | OUTPATIENT
Start: 2017-06-20 | End: 2017-06-20 | Stop reason: HOSPADM

## 2017-06-20 RX ORDER — NALOXONE HCL 0.4 MG/ML
0.2 VIAL (ML) INJECTION AS NEEDED
Status: DISCONTINUED | OUTPATIENT
Start: 2017-06-20 | End: 2017-06-20 | Stop reason: HOSPADM

## 2017-06-20 RX ORDER — LIDOCAINE HYDROCHLORIDE 20 MG/ML
INJECTION, SOLUTION INFILTRATION; PERINEURAL AS NEEDED
Status: DISCONTINUED | OUTPATIENT
Start: 2017-06-20 | End: 2017-06-20 | Stop reason: SURG

## 2017-06-20 RX ORDER — FENTANYL CITRATE 50 UG/ML
50 INJECTION, SOLUTION INTRAMUSCULAR; INTRAVENOUS
Status: DISCONTINUED | OUTPATIENT
Start: 2017-06-20 | End: 2017-06-20 | Stop reason: HOSPADM

## 2017-06-20 RX ORDER — HYDRALAZINE HYDROCHLORIDE 20 MG/ML
5 INJECTION INTRAMUSCULAR; INTRAVENOUS
Status: DISCONTINUED | OUTPATIENT
Start: 2017-06-20 | End: 2017-06-20 | Stop reason: HOSPADM

## 2017-06-20 RX ORDER — SODIUM CHLORIDE 0.9 % (FLUSH) 0.9 %
1-10 SYRINGE (ML) INJECTION AS NEEDED
Status: DISCONTINUED | OUTPATIENT
Start: 2017-06-20 | End: 2017-06-21 | Stop reason: HOSPADM

## 2017-06-20 RX ORDER — PROMETHAZINE HYDROCHLORIDE 25 MG/1
25 SUPPOSITORY RECTAL ONCE AS NEEDED
Status: DISCONTINUED | OUTPATIENT
Start: 2017-06-20 | End: 2017-06-20 | Stop reason: HOSPADM

## 2017-06-20 RX ORDER — OXYCODONE HYDROCHLORIDE AND ACETAMINOPHEN 5; 325 MG/1; MG/1
1 TABLET ORAL EVERY 4 HOURS PRN
Status: DISCONTINUED | OUTPATIENT
Start: 2017-06-20 | End: 2017-06-21

## 2017-06-20 RX ORDER — LORAZEPAM 0.5 MG/1
0.5 TABLET ORAL DAILY PRN
Status: DISCONTINUED | OUTPATIENT
Start: 2017-06-20 | End: 2017-06-21 | Stop reason: HOSPADM

## 2017-06-20 RX ORDER — LEVOTHYROXINE SODIUM 88 UG/1
88 TABLET ORAL EVERY MORNING
Status: DISCONTINUED | OUTPATIENT
Start: 2017-06-21 | End: 2017-06-21 | Stop reason: HOSPADM

## 2017-06-20 RX ORDER — ACYCLOVIR 400 MG/1
400 TABLET ORAL 3 TIMES DAILY
Status: DISCONTINUED | OUTPATIENT
Start: 2017-06-20 | End: 2017-06-21 | Stop reason: HOSPADM

## 2017-06-20 RX ORDER — IBUPROFEN 200 MG
100 TABLET ORAL EVERY 6 HOURS PRN
COMMUNITY
End: 2018-03-08

## 2017-06-20 RX ORDER — FENTANYL CITRATE 50 UG/ML
INJECTION, SOLUTION INTRAMUSCULAR; INTRAVENOUS AS NEEDED
Status: DISCONTINUED | OUTPATIENT
Start: 2017-06-20 | End: 2017-06-20 | Stop reason: SURG

## 2017-06-20 RX ORDER — SODIUM CHLORIDE, SODIUM LACTATE, POTASSIUM CHLORIDE, CALCIUM CHLORIDE 600; 310; 30; 20 MG/100ML; MG/100ML; MG/100ML; MG/100ML
9 INJECTION, SOLUTION INTRAVENOUS CONTINUOUS
Status: DISCONTINUED | OUTPATIENT
Start: 2017-06-20 | End: 2017-06-20

## 2017-06-20 RX ORDER — EPHEDRINE SULFATE 50 MG/ML
5 INJECTION, SOLUTION INTRAVENOUS ONCE AS NEEDED
Status: DISCONTINUED | OUTPATIENT
Start: 2017-06-20 | End: 2017-06-20 | Stop reason: HOSPADM

## 2017-06-20 RX ORDER — FENTANYL CITRATE 50 UG/ML
INJECTION, SOLUTION INTRAMUSCULAR; INTRAVENOUS
Status: DISPENSED
Start: 2017-06-20 | End: 2017-06-21

## 2017-06-20 RX ORDER — PROMETHAZINE HYDROCHLORIDE 25 MG/ML
12.5 INJECTION, SOLUTION INTRAMUSCULAR; INTRAVENOUS ONCE AS NEEDED
Status: DISCONTINUED | OUTPATIENT
Start: 2017-06-20 | End: 2017-06-20 | Stop reason: HOSPADM

## 2017-06-20 RX ORDER — DIPHENHYDRAMINE HYDROCHLORIDE 50 MG/ML
12.5 INJECTION INTRAMUSCULAR; INTRAVENOUS
Status: DISCONTINUED | OUTPATIENT
Start: 2017-06-20 | End: 2017-06-20 | Stop reason: HOSPADM

## 2017-06-20 RX ORDER — HYDROCODONE BITARTRATE AND ACETAMINOPHEN 7.5; 325 MG/1; MG/1
1 TABLET ORAL ONCE AS NEEDED
Status: DISCONTINUED | OUTPATIENT
Start: 2017-06-20 | End: 2017-06-20 | Stop reason: HOSPADM

## 2017-06-20 RX ORDER — MIDAZOLAM HYDROCHLORIDE 1 MG/ML
2 INJECTION INTRAMUSCULAR; INTRAVENOUS
Status: DISCONTINUED | OUTPATIENT
Start: 2017-06-20 | End: 2017-06-20 | Stop reason: HOSPADM

## 2017-06-20 RX ORDER — PROMETHAZINE HYDROCHLORIDE 25 MG/ML
5 INJECTION, SOLUTION INTRAMUSCULAR; INTRAVENOUS
Status: DISCONTINUED | OUTPATIENT
Start: 2017-06-20 | End: 2017-06-20 | Stop reason: HOSPADM

## 2017-06-20 RX ORDER — OXYCODONE AND ACETAMINOPHEN 7.5; 325 MG/1; MG/1
1 TABLET ORAL ONCE AS NEEDED
Status: DISCONTINUED | OUTPATIENT
Start: 2017-06-20 | End: 2017-06-20 | Stop reason: HOSPADM

## 2017-06-20 RX ORDER — DEXTROSE, SODIUM CHLORIDE, AND POTASSIUM CHLORIDE 5; .45; .15 G/100ML; G/100ML; G/100ML
50 INJECTION INTRAVENOUS CONTINUOUS
Status: DISCONTINUED | OUTPATIENT
Start: 2017-06-20 | End: 2017-06-21

## 2017-06-20 RX ORDER — FAMOTIDINE 10 MG/ML
20 INJECTION, SOLUTION INTRAVENOUS ONCE
Status: COMPLETED | OUTPATIENT
Start: 2017-06-20 | End: 2017-06-20

## 2017-06-20 RX ORDER — NAPROXEN SODIUM 220 MG
220 TABLET ORAL EVERY 12 HOURS PRN
Status: ON HOLD | COMMUNITY
End: 2017-06-20

## 2017-06-20 RX ORDER — HYDROMORPHONE HYDROCHLORIDE 1 MG/ML
0.5 INJECTION, SOLUTION INTRAMUSCULAR; INTRAVENOUS; SUBCUTANEOUS
Status: DISCONTINUED | OUTPATIENT
Start: 2017-06-20 | End: 2017-06-20 | Stop reason: HOSPADM

## 2017-06-20 RX ORDER — HYDROMORPHONE HYDROCHLORIDE 1 MG/ML
0.5 INJECTION, SOLUTION INTRAMUSCULAR; INTRAVENOUS; SUBCUTANEOUS EVERY 4 HOURS PRN
Status: DISCONTINUED | OUTPATIENT
Start: 2017-06-20 | End: 2017-06-21 | Stop reason: HOSPADM

## 2017-06-20 RX ORDER — CLINDAMYCIN PHOSPHATE 600 MG/50ML
600 INJECTION INTRAVENOUS EVERY 8 HOURS
Status: DISCONTINUED | OUTPATIENT
Start: 2017-06-20 | End: 2017-06-20 | Stop reason: HOSPADM

## 2017-06-20 RX ORDER — MAGNESIUM HYDROXIDE 1200 MG/15ML
LIQUID ORAL AS NEEDED
Status: DISCONTINUED | OUTPATIENT
Start: 2017-06-20 | End: 2017-06-20 | Stop reason: HOSPADM

## 2017-06-20 RX ADMIN — CLINDAMYCIN PHOSPHATE 600 MG: 12 INJECTION, SOLUTION INTRAVENOUS at 12:04

## 2017-06-20 RX ADMIN — EPHEDRINE SULFATE 10 MG: 50 INJECTION INTRAMUSCULAR; INTRAVENOUS; SUBCUTANEOUS at 12:32

## 2017-06-20 RX ADMIN — POTASSIUM CHLORIDE, DEXTROSE MONOHYDRATE AND SODIUM CHLORIDE 50 ML/HR: 150; 5; 450 INJECTION, SOLUTION INTRAVENOUS at 18:19

## 2017-06-20 RX ADMIN — ROCURONIUM BROMIDE 30 MG: 10 INJECTION INTRAVENOUS at 12:09

## 2017-06-20 RX ADMIN — FAMOTIDINE 20 MG: 10 INJECTION, SOLUTION INTRAVENOUS at 11:05

## 2017-06-20 RX ADMIN — KETOROLAC TROMETHAMINE 30 MG: 30 INJECTION, SOLUTION INTRAMUSCULAR; INTRAVENOUS at 12:54

## 2017-06-20 RX ADMIN — NEOSTIGMINE METHYLSULFATE 3 MG: 1 INJECTION INTRAMUSCULAR; INTRAVENOUS; SUBCUTANEOUS at 12:56

## 2017-06-20 RX ADMIN — LORAZEPAM 0.5 MG: 0.5 TABLET ORAL at 23:06

## 2017-06-20 RX ADMIN — SODIUM CHLORIDE, POTASSIUM CHLORIDE, SODIUM LACTATE AND CALCIUM CHLORIDE: 600; 310; 30; 20 INJECTION, SOLUTION INTRAVENOUS at 12:45

## 2017-06-20 RX ADMIN — GLYCOPYRROLATE 0.4 MG: 0.2 INJECTION INTRAMUSCULAR; INTRAVENOUS at 12:34

## 2017-06-20 RX ADMIN — FENTANYL CITRATE 50 MCG: 50 INJECTION INTRAMUSCULAR; INTRAVENOUS at 13:35

## 2017-06-20 RX ADMIN — LIDOCAINE HYDROCHLORIDE 100 MG: 20 INJECTION, SOLUTION INFILTRATION; PERINEURAL at 12:09

## 2017-06-20 RX ADMIN — OXYCODONE HYDROCHLORIDE AND ACETAMINOPHEN 1 TABLET: 5; 325 TABLET ORAL at 15:07

## 2017-06-20 RX ADMIN — OXYCODONE HYDROCHLORIDE AND ACETAMINOPHEN 1 TABLET: 5; 325 TABLET ORAL at 19:31

## 2017-06-20 RX ADMIN — FENTANYL CITRATE 100 MCG: 50 INJECTION INTRAMUSCULAR; INTRAVENOUS at 12:09

## 2017-06-20 RX ADMIN — ONDANSETRON 4 MG: 2 INJECTION INTRAMUSCULAR; INTRAVENOUS at 12:55

## 2017-06-20 RX ADMIN — FENTANYL CITRATE 50 MCG: 50 INJECTION INTRAMUSCULAR; INTRAVENOUS at 14:18

## 2017-06-20 RX ADMIN — ONDANSETRON 4 MG: 2 INJECTION INTRAMUSCULAR; INTRAVENOUS at 21:33

## 2017-06-20 RX ADMIN — MIDAZOLAM 1 MG: 1 INJECTION INTRAMUSCULAR; INTRAVENOUS at 11:06

## 2017-06-20 RX ADMIN — PROPOFOL 200 MG: 10 INJECTION, EMULSION INTRAVENOUS at 12:09

## 2017-06-20 RX ADMIN — SODIUM CHLORIDE, POTASSIUM CHLORIDE, SODIUM LACTATE AND CALCIUM CHLORIDE 9 ML/HR: 600; 310; 30; 20 INJECTION, SOLUTION INTRAVENOUS at 11:05

## 2017-06-20 NOTE — ANESTHESIA POSTPROCEDURE EVALUATION
Patient: Gwendolyn Huddleston    Procedure Summary     Date Anesthesia Start Anesthesia Stop Room / Location    06/20/17 1204 1315  [facility] / [facility]       Procedure Diagnosis Surgeon Provider    LAPAROSCOPIC CHOLECYSTECTOMY WITH INTRAOPERATIVE CHOLANGIOGRAM (N/A Abdomen) Gallbladder polyp  (Gallbladder polyp [K82.4]) MD Chandra Celaya MD          Anesthesia Type: general  Last vitals  /68 (06/20/17 1325)    Temp      Pulse     Resp 16 (06/20/17 1330)    SpO2        Post Anesthesia Care and Evaluation    Patient location during evaluation: PACU  Patient participation: complete - patient participated  Level of consciousness: awake and alert  Pain score: 0  Pain management: adequate  Airway patency: patent  Anesthetic complications: No anesthetic complications    Cardiovascular status: acceptable  Respiratory status: acceptable  Hydration status: acceptable

## 2017-06-20 NOTE — ANESTHESIA PROCEDURE NOTES
Airway  Urgency: elective    Airway not difficult    General Information and Staff    Patient location during procedure: OR  Anesthesiologist: SAUL SPANN  CRNA: JES SUAREZ    Indications and Patient Condition  Indications for airway management: airway protection    Preoxygenated: yes  MILS maintained throughout  Mask difficulty assessment: 1 - vent by mask    Final Airway Details  Final airway type: endotracheal airway      Successful airway: ETT  Cuffed: yes   Successful intubation technique: direct laryngoscopy  Facilitating devices/methods: intubating stylet  Endotracheal tube insertion site: oral  Blade: Stahl  Blade size: #2  ETT size: 7.0 mm  Cormack-Lehane Classification: grade I - full view of glottis  Placement verified by: chest auscultation and capnometry   Measured from: lips  ETT to lips (cm): 21  Number of attempts at approach: 1    Additional Comments  Smooth iv induction with no complications

## 2017-06-20 NOTE — PERIOPERATIVE NURSING NOTE
RN unable to take report at this time. Will return call. Patient informed. Family sent to pt's room..

## 2017-06-20 NOTE — PLAN OF CARE
"Problem: Patient Care Overview (Adult)  Goal: Plan of Care Review  Outcome: Ongoing (interventions implemented as appropriate)    06/20/17 1032   Coping/Psychosocial Response Interventions   Plan Of Care Reviewed With patient   Patient Care Overview   Progress progress toward functional goals as expected       Goal: Adult Individualization and Mutuality  Outcome: Ongoing (interventions implemented as appropriate)    06/20/17 1032   Individualization   Patient Specific Preferences PT PREFERS TO BE CALLED \"[facility]\"    Patient Specific Goals TO BE RELAXED FOR SX TODAY.   Patient Specific Interventions TO GIVE RELAXING MED PER AA ORDER   Mutuality/Individual Preferences   What Anxieties, Fears or Concerns Do You Have About Your Health or Care? NONE AT THIS TIME.    What Questions Do You Have About Your Health or Care? NONE AT THIS SHILPA.E    What Information Would Help Us Give You More Personalized Care? SEE ABOVE       Goal: Discharge Needs Assessment  Outcome: Ongoing (interventions implemented as appropriate)    Problem: Perioperative Period (Adult)  Goal: Signs and Symptoms of Listed Potential Problems Will be Absent or Manageable (Perioperative Period)  Outcome: Ongoing (interventions implemented as appropriate)    06/20/17 1032   Perioperative Period   Problems Assessed (Perioperative Period) all   Problems Present (Perioperative Period) none           "

## 2017-06-20 NOTE — OP NOTE
Operative Note :  Jessica Cannon MD      Gwendolyn Manathan  1955    Procedure Date: 06/20/17    Pre-op Diagnosis:  · Gallbladder polyps    Post-op Diagnosis:  · Cholelithiasis with choledocholithiasis, possible gallbladder polyps    Procedure:   · Laparoscopic cholecystectomy with intraoperative cholangiogram    Surgeon: Jessica Cannon MD    Assistant: Yanely De Leon PAC    Anesthesia:  General (general endotracheal tube)    Estimated Blood Loss: 5 cc    Specimens: Gallbladder    Complications: None    Indications:  · Mrs. Huddleston is a 61 year-old lady with gallbladder polyps discovered on a recent right upper quadrant ultrasound and a CT abdomen/pelvis. She had multiple polyps and was referred to see me for surgical evaluation. She presents today for elective cholecystectomy with cholangiogram.     Findings:   · 2 small stones within the common bile duct, nonobstructive in nature, visualized on intraoperative cholangiogram    Description of procedure:  The patient was brought to the operating room and placed on the OR table in supine position.  An endotracheal tube was inserted, and general anesthesia was induced.  Her anterior abdominal wall was prepped and draped in a sterile fashion.  A surgical timeout was completed.  A curvilinear infraumbilical skin incision was made after instillation of 0.5% Marcaine with epinephrine.  The umbilical stalk was grasped with a kocher clamp and elevated, allowing a longitudinal fasciotomy to be made.  A finger sweep was performed, ensuring that there were no underlying adherent loops of bowel.  Two separate 0 Vicryl stay sutures were placed on either side of the fascial defect and a Gamino trocar inserted.  The abdomen was insufflated.  Three additional 5 mm trochars were then placed under direct visualization within the subxiphoid and right upper quadrant subcostal space.  The gallbladder was elevated and retracted cephalad and the infundibulum retracted inferiorly.   The patient had a very redundant and floppy liver that was able to be folded up beneath the diaphragm.  The cystic duct and cystic artery were dissected out circumferentially until a firm critical view was obtained.  A single Hemoclip was placed across the junction of the cystic duct with the gallbladder infundibulum and 3 hemoclips were placed across the cystic artery.  A small hole was created on the anterior surface of the cystic duct, and a cholangiogram catheter inserted.  This was secured with an additional Hemoclip.  The glandular gram was then obtained, showing normal filling of the cystic duct, filling of the common bile duct, retrograde filling of the left and right intrahepatic ducts, and easy spillage into the duodenum.  There were 2 small filling defects that were mobile within the midportion of the common bile duct representing stones.  The catheter was then withdrawn and 2 additional hemoclips placed across the cystic duct.  The duct and artery were then transected, leaving 2 clips behind on each of the stumps.  The gallbladder was slowly dissected off of the undersurface of the liver using electrocautery and removed from the peritoneal cavity within an Endo Catch bag.  It was passed off to pathology in formalin.  The right upper quadrant was then reinspected and found to be hemostatic.  All trochars were then removed and the abdomen desufflated.  The fascial incision at the umbilicus was closed using the previously placed stay sutures.  All skin incisions were closed with 4-0 Vicryl.  The patient was then extubated and transferred to PACU in stable condition.  Gastroenterology has been consulted for a postoperative ERCP for common bile duct stone extraction.    Jessica Cannon MD  General and Endoscopic Surgery  [facility]    [address], [address]  [place], [place], [address]  P: [phone]  F: [phone]

## 2017-06-20 NOTE — H&P (VIEW-ONLY)
General Surgery  Initial Office Visit    CC: Gallbladder polyps    HPI: The patient is a pleasant 61 y.o. year-old lady who presents today for evaluation of recently discovered polyps within her gallbladder.  She was having some right upper quadrant abdominal discomfort that was occurring daily and intermittent in nature that she describes as a pressure underneath her right rib cage.  The pain occurred spontaneously, and seems to be improved with external pressure application and also with walking but had no clear association with any by mouth intake.  She denies any nausea, vomiting, fevers, or chills but has had some night sweats persistently since a hysterectomy in 1999.  Both her mother and father have her gallbladder removed for unknown reasons.  She had an ultrasound of her gallbladder as well as a CT of the abdomen and pelvis that both demonstrated multiple gallbladder polyps, with the largest measuring 6 mm in size on the ultrasound.  She has a personal history of melanoma of the right chest wall that was removed with sequential shave biopsies by her dermatologist, and was very concerned that her symptoms of right upper quadrant abdominal discomfort may have been related to bony metastases of her melanoma and this was why she was evaluated by her primary care physician further.    Past Medical History: Melanoma of the right chest wall, shingles, hypothyroidism, periprocedural bleeding    Past Surgical History: Colonoscopy (2010), hysterectomy (1999), shave removal of melanoma from right chest wall, anal sphincteroplasty (1984), bilateral lower extremity venous ablation    Medications: Levothyroxine 88 µg daily, Ativan 0.5 mg as needed for anxiety    Allergies: Penicillins (hives)    Family History: Mother and father with gallbladder issues requiring cholecystectomy, father with prostate cancer, daughter with bleeding disorder (questionable ITP)    Social History: , nonsmoker, social alcohol use,  retired    ROS:   Constitutional: Positive for night sweats; Negative for fevers or chills  HENT: Negative for hearing loss or runny nose  Eyes: Negative for vision changes or scleral icterus  Respiratory: Negative for cough or shortness of breath  Cardiovascular: Negative for chest pain or heart palpitations  Gastrointestinal: Positive for abdominal pain; Negative for nausea, vomiting, constipation, melena, or hematochezia  Genitourinary: Negative for hematuria or dysuria  Musculoskeletal: Positive for neck pain; Negative for joint pain or back pain  Neurologic: Negative for headaches or dizziness  Psychiatric: Negative for anxiety or depression  Skin: Positive for recent rash (shingles); negative for nonhealing wounds or pallor  All other systems reviewed and negative    Physical Exam:  Vitals:    06/13/17 0814   Pulse: 75   SpO2: 96%     General: No acute distress, well-nourished & well-developed  HEAD: normocephalic, atraumatic  EYES: normal conjunctiva, sclera anicteric  EARS: grossly normal hearing  NECK: supple, no thyromegaly  CARDIOVASCULAR: regular rate and rhythm  RESPIRATORY: clear to auscultation bilaterally  GASTROINTESTINAL: soft, nontender, non-distended  MUSCULOSKELETAL: normal gait and station. No gross extremity abnormalities  PSYCHIATRIC: oriented x3, normal mood and affect    IMAGING:  RUQ ULTRASOUND:  IMPRESSION:  Multiple gallbladder polyps as described. Surgical consultation for consideration of surgical removal of the gallbladder is suggested.    CT ABDOMEN/PELVIS:  CONCLUSION:  1. Tiny gallbladder polyp is reidentified.   2. Nominal sigmoid diverticulosis.  3. No acute intra-abdominal abnormality or indication of metastatic disease.    ASSESSMENT & PLAN  Mrs. Huddleston is a 61 year-old lady with multiple gallbladder polyps Measuring 6 mm in largest diameter.  Given the overall number of polyps, and increased risk for malignancy with polyps over 1 cm I have recommended she consider  cholecystectomy before any of the numerous polyps have the opportunity to increase in size.  She is very interested in pursuing cholecystectomy at this time given her personal history of melanoma and anxiety about possible cancer.  We discussed the risks of the procedure to include bleeding, possible postoperative bile leak, and possible common bile duct injury.  Despite these risks, she has consented to proceed and I'm happy to get this scheduled for her at her earliest convenience.  She does have a questionable history of bleeding with prior surgeries on her bilateral lower chin any veins and her daughter has a possible history of ITP.  For this reason I will follow-up on her platelet count with her preadmission testing.    Jessica Cannon MD  General and Endoscopic Surgery  [facility]    [address], [address]  [place], [place], [address]  P: [phone]  F: [phone]

## 2017-06-20 NOTE — INTERVAL H&P NOTE
H&P reviewed. The patient was examined and there are no changes to the H&P. Plan for lap lexus with cholangiogram today for multiple gallbladder polyps. All risks discussed last week in the office and all questions answered today.    Jessica Cannon MD  General and Endoscopic Surgery  [facility]    [address], [address]  [place], [place], [address]  P: [phone]  F: [phone]

## 2017-06-20 NOTE — ANESTHESIA PREPROCEDURE EVALUATION
Anesthesia Evaluation     Nursing notes reviewed   NPO Solid Status: > 8 hours  NPO Liquid Status: > 2 hours     Airway   Mallampati: II  Neck ROM: full  Dental      Comment: [place] and bridge    Pulmonary     breath sounds clear to auscultation  Cardiovascular     Rhythm: regular        Neuro/Psych  GI/Hepatic/Renal/Endo    (+)  hypothyroidism,     Musculoskeletal     Abdominal    Substance History      OB/GYN          Other   (+) arthritis                                   Anesthesia Plan    ASA 2     general     intravenous induction   Anesthetic plan and risks discussed with patient.

## 2017-06-21 ENCOUNTER — APPOINTMENT (OUTPATIENT)
Dept: GENERAL RADIOLOGY | Facility: HOSPITAL | Age: 62
End: 2017-06-21

## 2017-06-21 ENCOUNTER — ANESTHESIA EVENT (OUTPATIENT)
Dept: GASTROENTEROLOGY | Facility: HOSPITAL | Age: 62
End: 2017-06-21

## 2017-06-21 ENCOUNTER — ANESTHESIA (OUTPATIENT)
Dept: GASTROENTEROLOGY | Facility: HOSPITAL | Age: 62
End: 2017-06-21

## 2017-06-21 VITALS
WEIGHT: 132.19 LBS | BODY MASS INDEX: 23.42 KG/M2 | OXYGEN SATURATION: 98 % | RESPIRATION RATE: 16 BRPM | HEIGHT: 63 IN | HEART RATE: 47 BPM | DIASTOLIC BLOOD PRESSURE: 63 MMHG | SYSTOLIC BLOOD PRESSURE: 107 MMHG | TEMPERATURE: 97 F

## 2017-06-21 PROBLEM — K82.4 GALLBLADDER POLYP: Status: ACTIVE | Noted: 2017-06-21

## 2017-06-21 LAB
ALBUMIN SERPL-MCNC: 3.8 G/DL (ref 3.5–5.2)
ALBUMIN/GLOB SERPL: 1.5 G/DL
ALP SERPL-CCNC: 42 U/L (ref 39–117)
ALT SERPL W P-5'-P-CCNC: 47 U/L (ref 1–33)
ANION GAP SERPL CALCULATED.3IONS-SCNC: 13.4 MMOL/L
AST SERPL-CCNC: 47 U/L (ref 1–32)
BASOPHILS # BLD AUTO: 0.03 10*3/MM3 (ref 0–0.2)
BASOPHILS NFR BLD AUTO: 0.3 % (ref 0–1.5)
BILIRUB SERPL-MCNC: 0.4 MG/DL (ref 0.1–1.2)
BUN BLD-MCNC: 12 MG/DL (ref 8–23)
BUN/CREAT SERPL: 17.4 (ref 7–25)
CALCIUM SPEC-SCNC: 8.5 MG/DL (ref 8.6–10.5)
CHLORIDE SERPL-SCNC: 99 MMOL/L (ref 98–107)
CO2 SERPL-SCNC: 23.6 MMOL/L (ref 22–29)
CREAT BLD-MCNC: 0.69 MG/DL (ref 0.57–1)
CYTO UR: NORMAL
DEPRECATED RDW RBC AUTO: 45.7 FL (ref 37–54)
EOSINOPHIL # BLD AUTO: 0 10*3/MM3 (ref 0–0.7)
EOSINOPHIL NFR BLD AUTO: 0 % (ref 0.3–6.2)
ERYTHROCYTE [DISTWIDTH] IN BLOOD BY AUTOMATED COUNT: 13.1 % (ref 11.7–13)
GFR SERPL CREATININE-BSD FRML MDRD: 86 ML/MIN/1.73
GLOBULIN UR ELPH-MCNC: 2.5 GM/DL
GLUCOSE BLD-MCNC: 118 MG/DL (ref 65–99)
HCT VFR BLD AUTO: 38.3 % (ref 35.6–45.5)
HGB BLD-MCNC: 12.4 G/DL (ref 11.9–15.5)
IMM GRANULOCYTES # BLD: 0.02 10*3/MM3 (ref 0–0.03)
IMM GRANULOCYTES NFR BLD: 0.2 % (ref 0–0.5)
LAB AP CASE REPORT: NORMAL
LYMPHOCYTES # BLD AUTO: 1.16 10*3/MM3 (ref 0.9–4.8)
LYMPHOCYTES NFR BLD AUTO: 11.1 % (ref 19.6–45.3)
Lab: NORMAL
MCH RBC QN AUTO: 31.2 PG (ref 26.9–32)
MCHC RBC AUTO-ENTMCNC: 32.4 G/DL (ref 32.4–36.3)
MCV RBC AUTO: 96.5 FL (ref 80.5–98.2)
MONOCYTES # BLD AUTO: 0.51 10*3/MM3 (ref 0.2–1.2)
MONOCYTES NFR BLD AUTO: 4.9 % (ref 5–12)
NEUTROPHILS # BLD AUTO: 8.76 10*3/MM3 (ref 1.9–8.1)
NEUTROPHILS NFR BLD AUTO: 83.5 % (ref 42.7–76)
PATH REPORT.FINAL DX SPEC: NORMAL
PATH REPORT.GROSS SPEC: NORMAL
PLATELET # BLD AUTO: 217 10*3/MM3 (ref 140–500)
PMV BLD AUTO: 10.9 FL (ref 6–12)
POTASSIUM BLD-SCNC: 4.1 MMOL/L (ref 3.5–5.2)
PROT SERPL-MCNC: 6.3 G/DL (ref 6–8.5)
RBC # BLD AUTO: 3.97 10*6/MM3 (ref 3.9–5.2)
SODIUM BLD-SCNC: 136 MMOL/L (ref 136–145)
WBC NRBC COR # BLD: 10.48 10*3/MM3 (ref 4.5–10.7)

## 2017-06-21 PROCEDURE — 80053 COMPREHEN METABOLIC PANEL: CPT | Performed by: SURGERY

## 2017-06-21 PROCEDURE — 25010000002 PROPOFOL 10 MG/ML EMULSION: Performed by: ANESTHESIOLOGY

## 2017-06-21 PROCEDURE — C1769 GUIDE WIRE: HCPCS | Performed by: INTERNAL MEDICINE

## 2017-06-21 PROCEDURE — 0 IOPAMIDOL 61 % SOLUTION: Performed by: INTERNAL MEDICINE

## 2017-06-21 PROCEDURE — G0378 HOSPITAL OBSERVATION PER HR: HCPCS

## 2017-06-21 PROCEDURE — 43264 ERCP REMOVE DUCT CALCULI: CPT | Performed by: INTERNAL MEDICINE

## 2017-06-21 PROCEDURE — 85025 COMPLETE CBC W/AUTO DIFF WBC: CPT | Performed by: SURGERY

## 2017-06-21 PROCEDURE — 43262 ENDO CHOLANGIOPANCREATOGRAPH: CPT | Performed by: INTERNAL MEDICINE

## 2017-06-21 PROCEDURE — 99024 POSTOP FOLLOW-UP VISIT: CPT | Performed by: SURGERY

## 2017-06-21 PROCEDURE — 74328 X-RAY BILE DUCT ENDOSCOPY: CPT

## 2017-06-21 PROCEDURE — 99243 OFF/OP CNSLTJ NEW/EST LOW 30: CPT | Performed by: INTERNAL MEDICINE

## 2017-06-21 RX ORDER — SODIUM CHLORIDE 0.9 % (FLUSH) 0.9 %
3 SYRINGE (ML) INJECTION AS NEEDED
Status: DISCONTINUED | OUTPATIENT
Start: 2017-06-21 | End: 2017-06-21

## 2017-06-21 RX ORDER — HYDROCODONE BITARTRATE AND ACETAMINOPHEN 5; 325 MG/1; MG/1
1 TABLET ORAL EVERY 4 HOURS PRN
Qty: 40 TABLET | Refills: 0 | Status: SHIPPED | OUTPATIENT
Start: 2017-06-21 | End: 2017-06-30

## 2017-06-21 RX ORDER — SODIUM CHLORIDE, SODIUM LACTATE, POTASSIUM CHLORIDE, CALCIUM CHLORIDE 600; 310; 30; 20 MG/100ML; MG/100ML; MG/100ML; MG/100ML
1000 INJECTION, SOLUTION INTRAVENOUS CONTINUOUS PRN
Status: DISCONTINUED | OUTPATIENT
Start: 2017-06-21 | End: 2017-06-21 | Stop reason: HOSPADM

## 2017-06-21 RX ORDER — LIDOCAINE HYDROCHLORIDE 20 MG/ML
INJECTION, SOLUTION INFILTRATION; PERINEURAL AS NEEDED
Status: DISCONTINUED | OUTPATIENT
Start: 2017-06-21 | End: 2017-06-21 | Stop reason: SURG

## 2017-06-21 RX ORDER — PROPOFOL 10 MG/ML
VIAL (ML) INTRAVENOUS AS NEEDED
Status: DISCONTINUED | OUTPATIENT
Start: 2017-06-21 | End: 2017-06-21 | Stop reason: SURG

## 2017-06-21 RX ORDER — HYDROCODONE BITARTRATE AND ACETAMINOPHEN 5; 325 MG/1; MG/1
1 TABLET ORAL EVERY 4 HOURS PRN
Status: DISCONTINUED | OUTPATIENT
Start: 2017-06-21 | End: 2017-06-21 | Stop reason: HOSPADM

## 2017-06-21 RX ADMIN — SODIUM CHLORIDE, POTASSIUM CHLORIDE, SODIUM LACTATE AND CALCIUM CHLORIDE: 600; 310; 30; 20 INJECTION, SOLUTION INTRAVENOUS at 11:35

## 2017-06-21 RX ADMIN — HYDROCODONE BITARTRATE AND ACETAMINOPHEN 1 TABLET: 5; 325 TABLET ORAL at 12:56

## 2017-06-21 RX ADMIN — LEVOTHYROXINE SODIUM 88 MCG: 88 TABLET ORAL at 06:38

## 2017-06-21 RX ADMIN — PROPOFOL 100 MG: 10 INJECTION, EMULSION INTRAVENOUS at 11:45

## 2017-06-21 RX ADMIN — LIDOCAINE HYDROCHLORIDE 100 MG: 20 INJECTION, SOLUTION INFILTRATION; PERINEURAL at 11:40

## 2017-06-21 RX ADMIN — PROPOFOL 150 MG: 10 INJECTION, EMULSION INTRAVENOUS at 11:39

## 2017-06-21 RX ADMIN — SODIUM CHLORIDE, POTASSIUM CHLORIDE, SODIUM LACTATE AND CALCIUM CHLORIDE 1000 ML: 600; 310; 30; 20 INJECTION, SOLUTION INTRAVENOUS at 10:44

## 2017-06-21 NOTE — PROGRESS NOTES
Continued Stay Note  [facility]     Patient Name: Gwendolyn Huddleston  MRN: [id number]  Today's Date: 6/21/2017    Admit Date: 6/20/2017          Discharge Plan       06/21/17 1428    Final Note    Final Note 01 / Home w/o needs              Discharge Codes     None        Expected Discharge Date and Time     Expected Discharge Date Expected Discharge Time    Jun 21, 2017             Magy Nielsen RN

## 2017-06-21 NOTE — ANESTHESIA POSTPROCEDURE EVALUATION
Patient: Gwendolyn Huddleston    Procedure Summary     Date Anesthesia Start Anesthesia Stop Room / Location    06/21/17 1135 1154  [facility] /  [facility]       Procedure Diagnosis Surgeon Provider    ENDOSCOPIC RETROGRADE CHOLANGIOPANCREATOGRAPHY with sphinterotomy and ballon duct sweep (N/A ) Calculus of bile duct with cholecystitis without obstruction, unspecified cholecystitis acuity  (Calculus of bile duct with cholecystitis without obstruction, unspecified cholecystitis acuity [K80.40]) MD Sae Bhat MD          Anesthesia Type: MAC  Last vitals  BP      Temp      Pulse     Resp      SpO2        Post Anesthesia Care and Evaluation    Patient location during evaluation: PACU  Patient participation: complete - patient participated  Level of consciousness: sleepy but conscious  Pain score: 0  Pain management: adequate  Airway patency: patent  Anesthetic complications: No anesthetic complications    Cardiovascular status: acceptable  Respiratory status: acceptable  Hydration status: acceptable

## 2017-06-21 NOTE — PLAN OF CARE
Problem: Patient Care Overview (Adult)  Goal: Plan of Care Review  Outcome: Ongoing (interventions implemented as appropriate)    06/21/17 0359   Coping/Psychosocial Response Interventions   Plan Of Care Reviewed With patient   Patient Care Overview   Progress no change   Outcome Evaluation   Outcome Summary/Follow up Plan PT KEEP NPO FOR ERCP IN AM, PAIN WELL CONTROLLED BY PO PAIN MED, VOIDING W/O DIFFICULTY, ENCOURAGE TO USE INCENTIVE SPIROMETER, UP WITH ASSIST.       Goal: Adult Individualization and Mutuality  Outcome: Ongoing (interventions implemented as appropriate)  Goal: Discharge Needs Assessment  Outcome: Ongoing (interventions implemented as appropriate)    Problem: Perioperative Period (Adult)  Goal: Signs and Symptoms of Listed Potential Problems Will be Absent or Manageable (Perioperative Period)  Outcome: Ongoing (interventions implemented as appropriate)    Problem: Pain, Acute (Adult)  Goal: Identify Related Risk Factors and Signs and Symptoms  Outcome: Outcome(s) achieved Date Met:  06/21/17  Goal: Acceptable Pain Control/Comfort Level  Outcome: Ongoing (interventions implemented as appropriate)

## 2017-06-21 NOTE — CONSULTS
[facility]  Initial Inpatient Consult Note    Referring Provider: Dr Jessica Cannon    Reason for Consultation: Common bile duct stone    Subjective     History of present illness:  Patient 61-year-old female with history of gallbladder polyps and right upper quadrant pain and presented to the hospital for cholecystectomy.  Patient underwent unremarkable cholecystectomy yesterday with intraoperative cholangiogram consistent with common duct stones.  Patient was noted with stones in the gallbladder is well seen on ultrasound.  Patient is otherwise doing well postop here to evaluate for possible ERCP for duct clearance.      Past Medical History:  Past Medical History:   Diagnosis Date   • Anxiety    • Arthritis    • Gall bladder polyp    • History of shingles 06/2017    on right breast   • Hypothyroidism    • Melanoma 2011    upper chest, followed by Dr. Mariam Eldridge    • Postpartum hemorrhage    • Sigmoid diverticulosis    • Surgical complication     excessive bleeding during surgical procedures       Past Surgical History:  Past Surgical History:   Procedure Laterality Date   • ANAL SPHINCTEROPLASTY N/A 1984   • COLONOSCOPY N/A 2010    normal colonoscopy, done at [facility]   • SKIN CANCER EXCISION N/A 2011    Melanoma of the upper chest excision   • TOTAL LAPAROSCOPIC HYSTERECTOMY SALPINGO OOPHORECTOMY Bilateral 1999    Dr. Ramos   • VARICOSE VEIN SURGERY Bilateral    • VEIN LIGATION AND STRIPPING Right 2015   • WISDOM TOOTH EXTRACTION N/A         Social History:   Social History   Substance Use Topics   • Smoking status: Never Smoker   • Smokeless tobacco: Never Used   • Alcohol use 1.8 oz/week     3 Cans of beer per week      Comment: socially         Family History:  Family History   Problem Relation Age of Onset   • Arthritis Mother    • Hypertension Mother    • Osteoporosis Mother    • Bleeding Disorder Mother    • Prostate cancer Father    • Liver cancer Father    • Diabetes Father    •  Glaucoma Father    • Colon polyps Father    • Hyperthyroidism Sister    • Heart disease Paternal Grandfather    • Malig Hyperthermia Neg Hx        Home Meds:  Prescriptions Prior to Admission   Medication Sig Dispense Refill Last Dose   • ibuprofen (ADVIL,MOTRIN) 200 MG tablet Take 100 mg by mouth Every 6 (Six) Hours As Needed for Mild Pain (1-3).   6/13/2017   • levothyroxine (SYNTHROID, LEVOTHROID) 88 MCG tablet Take 88 mcg by mouth Daily.   6/19/2017 at 0800   • acyclovir (ZOVIRAX) 400 MG tablet Take 1 tablet by mouth 3 (Three) Times a Day. Take no more than 5 doses a day. 6 tablet 0 6/13/2017   • LORazepam (ATIVAN) 0.5 MG tablet Take 1 tablet by mouth Daily As Needed for anxiety. 30 tablet 2 6/13/2017       Current Meds:     acyclovir 400 mg Oral TID   levothyroxine 88 mcg Oral QAM       Allergies:  Allergies   Allergen Reactions   • Penicillins Hives       Review of Systems  All systems were reviewed and negative except for:  Gastrointestinal: postitive for  Juliann-incisional tenderness     Objective     Vital Signs  Temp:  [97.2 °F (36.2 °C)-98.1 °F (36.7 °C)] 98.1 °F (36.7 °C)  Heart Rate:  [57-87] 57  Resp:  [14-18] 16  BP: ()/(54-82) 101/60    Physical Exam:  General Appearance:    Alert, cooperative, in no acute distress   Head:    Normocephalic, without obvious abnormality, atraumatic   Eyes:            Lids and lashes normal, conjunctivae and sclerae normal, no   icterus   Throat:   No oral lesions, no thrush, oral mucosa moist   Neck:   No adenopathy, supple, trachea midline, no thyromegaly, no   carotid bruit, no JVD   Lungs:     Clear to auscultation,respirations regular, even and                   unlabored    Heart:    Regular rhythm and normal rate, normal S1 and S2, no            murmur, no gallop, no rub, no click   Chest Wall:    No abnormalities observed   Abdomen:     Normal bowel sounds, no masses, no organomegaly, soft        non-tender, non-distended, no guarding, no rebound                  tenderness   Rectal:     Deferred   Extremities:   no edema, no cyanosis, no redness   Skin:   No bleeding, bruising or rash   Lymph nodes:   No palpable adenopathy   Psychiatric:  Judgement and insight: normal   Orientation to person place and time: normal   Mood and affect: normal     Results Review:   I reviewed the patient's new clinical results.  I reviewed the patient's new imaging results and agree with the interpretation.      Results from last 7 days  Lab Units 06/21/17  0448 06/16/17  0839   WBC 10*3/mm3 10.48 8.10   HEMOGLOBIN g/dL 12.4 13.5   HEMATOCRIT % 38.3 41.5   PLATELETS 10*3/mm3 217 231         Results from last 7 days  Lab Units 06/21/17  0448 06/16/17  0839   SODIUM mmol/L 136 138   POTASSIUM mmol/L 4.1 4.0   CHLORIDE mmol/L 99 102   TOTAL CO2 mmol/L 23.6 23.2   BUN mg/dL 12 21   CREATININE mg/dL 0.69 0.79   CALCIUM mg/dL 8.5* 9.4   BILIRUBIN mg/dL 0.4 0.5   ALK PHOS U/L 42 48   ALT (SGPT) U/L 47* 14   AST (SGOT) U/L 47* 18   GLUCOSE mg/dL 118* 85             No results found for: LIPASE    Radiology:  Imaging Results (last 72 hours)     Procedure Component Value Units Date/Time    FL Cholangiogram Operative [[id number]] Collected:  06/20/17 1741     Updated:  06/20/17 1741    Narrative:       INTRAOPERATIVE CHOLANGIOGRAM 06/20/2017     HISTORY: Laparoscopic cholecystectomy with possible stones.     FINDINGS:  Contrast is seen in the intrahepatic, common hepatic and  common bile ducts. There are at least 2 small movable filling defects in  the common duct which could represent air bubbles or tiny stones. No  bile duct strictures or biliary dilatation is seen. There is free spill  of contrast into the duodenum.     COMMENT: If further evaluation is clinically indicated, an MRCP may be  helpful.     Fluoroscopy time 10 seconds.             Assessment/Plan     Active Problems:    Cholelithiasis with choledocholithiasis      Patient with postcholecystectomy, IOC revealed common bile duct stones.   Agree patient should undergo ERCP will arrange for today.  Risk benefits discussed with the patient and patient agrees.    I discussed the patients findings and my recommendations with patient

## 2017-06-21 NOTE — ANESTHESIA PREPROCEDURE EVALUATION
Anesthesia Evaluation     Patient summary reviewed and Nursing notes reviewed   NPO Solid Status: > 8 hours  NPO Liquid Status: > 8 hours     Airway   Mallampati: II  Neck ROM: full  no difficulty expected  Dental - normal exam     Pulmonary    Cardiovascular     Rhythm: regular        Neuro/Psych  GI/Hepatic/Renal/Endo    (+)  hypothyroidism,     Musculoskeletal     Abdominal    Substance History      OB/GYN          Other                                        Anesthesia Plan    ASA 2     MAC     intravenous induction   Anesthetic plan and risks discussed with patient.

## 2017-06-21 NOTE — PLAN OF CARE
Problem: GI Endoscopy (Adult)  Intervention: Monitor/Manage Procedure Recovery    06/21/17 1036   Respiratory Interventions   Airway/Ventilation Management airway patency maintained   Coping/Psychosocial Interventions   Environmental Support calm environment promoted   Activity   Activity Type activity adjusted per tolerance   Cardiac Interventions   Warming Thermoregulation Maintenance warm blankets applied         Goal: Signs and Symptoms of Listed Potential Problems Will be Absent or Manageable (GI Endoscopy)  Outcome: Ongoing (interventions implemented as appropriate)    06/21/17 1036   GI Endoscopy   Problems Assessed (GI Endoscopy) pain   Problems Present (GI Endoscopy) none

## 2017-06-21 NOTE — DISCHARGE SUMMARY
Discharge Summary    Patient name: Gwendolyn Huddleston    Medical record number: [id number]    Admission date: 6/20/2017  Discharge date: 6/21/2017     Attending physician: Jessica Cannon MD    Primary care physician: Tony Quinonez Jr., MD    Referring physician: Jessica Cannon MD  [address] [address]    Consulting physician(s): Dr. Stanton, gastroenterology    Condition on discharge: improving    Admitting diagnosis:   Patient Active Problem List   Diagnosis   • Cholelithiasis with choledocholithiasis   • Gallbladder polyp       Final diagnosis: Gallbladder polyps with choledocholithiasis    Procedures: Laparoscopic cholecystectomy with intraoperative cholangiogram performed 6/20/2017, ERCP performed 6/21/2017    History of present illness: The patient is a  61 y.o. female that was admitted to the hospital with recently diagnosed gallbladder polyps identified on ultrasound and CT imaging. She was admitted for an elective laparoscopic cholecystectomy.    Hospital course: She underwent the laparoscopic cholecystectomy with cholangiogram and was incidentally discovered to have two small gallstones within the common bile duct on cholangiogram. She was therefore admitted post-operatively and underwent ERCP the following morning by Dr. Stanton where one 3 mm stone was extracted and her common bile duct was clear thereafter on repeat imaging. She is being discharged home in stale condition following her ERCP with instructions to return to the ER for any signs of jaundice scleral icterus, nausea, or vomiting.    Discharge medications:    Gwendolyn Huddleston   Home Medication Instructions MICHELLE:[id number]    Printed on:06/21/17 1776   Medication Information                      acyclovir (ZOVIRAX) 400 MG tablet  Take 1 tablet by mouth 3 (Three) Times a Day. Take no more than 5 doses a day.             HYDROcodone-acetaminophen (NORCO) 5-325 MG per tablet  Take 1 tablet by mouth Every 4 (Four) Hours  As Needed (pain).             ibuprofen (ADVIL,MOTRIN) 200 MG tablet  Take 100 mg by mouth Every 6 (Six) Hours As Needed for Mild Pain (1-3).             levothyroxine (SYNTHROID, LEVOTHROID) 88 MCG tablet  Take 88 mcg by mouth Daily.             LORazepam (ATIVAN) 0.5 MG tablet  Take 1 tablet by mouth Daily As Needed for anxiety.                 Discharge instructions:    - Resume regular diet as tolerated  - No heavy lifting of more than 15 lb for 6 weeks. Can start doing aerobic type exercise in 4 weeks.   - No driving while taking narcotic pain medications  - You may resume showering, no tub bathing for two weeks while your incisions heal.  - Wash your incisions with soap and water daily in the shower, pat dry, and leave open to air.    Follow-up appointment: Follow up with Jessica Cannon MD in the office in 2 weeks. Call for appointment at [phone].    CODE STATUS: Full code

## 2017-06-30 ENCOUNTER — OFFICE VISIT (OUTPATIENT)
Dept: SURGERY | Facility: CLINIC | Age: 62
End: 2017-06-30

## 2017-06-30 DIAGNOSIS — K82.4 GALLBLADDER POLYP: ICD-10-CM

## 2017-06-30 DIAGNOSIS — K80.70 CHOLELITHIASIS WITH CHOLEDOCHOLITHIASIS: Primary | ICD-10-CM

## 2017-06-30 PROCEDURE — 99024 POSTOP FOLLOW-UP VISIT: CPT | Performed by: SURGERY

## 2017-11-08 RX ORDER — LEVOTHYROXINE SODIUM 88 MCG
TABLET ORAL
Qty: 90 TABLET | Refills: 0 | Status: SHIPPED | OUTPATIENT
Start: 2017-11-08 | End: 2017-11-28 | Stop reason: SDUPTHER

## 2017-11-15 ENCOUNTER — APPOINTMENT (OUTPATIENT)
Dept: WOMENS IMAGING | Facility: HOSPITAL | Age: 62
End: 2017-11-15

## 2017-11-15 PROCEDURE — 77063 BREAST TOMOSYNTHESIS BI: CPT | Performed by: RADIOLOGY

## 2017-11-15 PROCEDURE — 77067 SCR MAMMO BI INCL CAD: CPT | Performed by: RADIOLOGY

## 2017-11-15 PROCEDURE — G0202 SCR MAMMO BI INCL CAD: HCPCS | Performed by: RADIOLOGY

## 2017-11-20 ENCOUNTER — OFFICE VISIT (OUTPATIENT)
Dept: INTERNAL MEDICINE | Age: 62
End: 2017-11-20

## 2017-11-20 VITALS
DIASTOLIC BLOOD PRESSURE: 70 MMHG | WEIGHT: 134 LBS | HEIGHT: 63 IN | OXYGEN SATURATION: 97 % | TEMPERATURE: 98.4 F | SYSTOLIC BLOOD PRESSURE: 110 MMHG | BODY MASS INDEX: 23.74 KG/M2 | HEART RATE: 68 BPM

## 2017-11-20 DIAGNOSIS — F41.8 OTHER SPECIFIED ANXIETY DISORDERS: Chronic | ICD-10-CM

## 2017-11-20 DIAGNOSIS — K59.1 FUNCTIONAL DIARRHEA: ICD-10-CM

## 2017-11-20 DIAGNOSIS — E03.9 HYPOTHYROIDISM, UNSPECIFIED TYPE: Primary | Chronic | ICD-10-CM

## 2017-11-20 DIAGNOSIS — Z13.820 ENCOUNTER FOR SCREENING FOR OSTEOPOROSIS: ICD-10-CM

## 2017-11-20 PROBLEM — K80.70 CHOLELITHIASIS WITH CHOLEDOCHOLITHIASIS: Status: RESOLVED | Noted: 2017-06-20 | Resolved: 2017-11-20

## 2017-11-20 PROBLEM — K82.4 GALLBLADDER POLYP: Status: RESOLVED | Noted: 2017-06-21 | Resolved: 2017-11-20

## 2017-11-20 PROCEDURE — 99215 OFFICE O/P EST HI 40 MIN: CPT | Performed by: INTERNAL MEDICINE

## 2017-11-20 RX ORDER — LORAZEPAM 0.5 MG/1
0.5 TABLET ORAL DAILY PRN
Qty: 30 TABLET | Refills: 2 | Status: SHIPPED | OUTPATIENT
Start: 2017-11-20 | End: 2019-07-29 | Stop reason: SDUPTHER

## 2017-11-20 NOTE — ASSESSMENT & PLAN NOTE
May continue lorazepam 0.5 mg sparingly as needed for anxiety.  Consent / agreement signed. Leonardo requested. UDS ordered.   Rx printed for lorazepam 0.5 mg qd prn anxiety #30, 2 refills.

## 2017-11-20 NOTE — ASSESSMENT & PLAN NOTE
Check celiac serology and advised to try complete lactose/dairy free diet for 1 month to see if this helps.

## 2017-11-20 NOTE — PROGRESS NOTES
"[facility] INTERNAL MEDICINE  SCOTT HALLMAN M.D.      Gwendolyn Fayets / 62 y.o. / female  11/20/2017    VITALS:    /70  Pulse 68  Temp 98.4 °F (36.9 °C)  Ht 63\" (160 cm)  Wt 134 lb (60.8 kg)  SpO2 97%  BMI 23.74 kg/m2    BP Readings from Last 3 Encounters:   11/20/17 110/70   06/21/17 107/63   06/16/17 99/68     Wt Readings from Last 3 Encounters:   11/20/17 134 lb (60.8 kg)   06/20/17 132 lb 3 oz (60 kg)   06/16/17 133 lb (60.3 kg)      Body mass index is 23.74 kg/(m^2).    CC: Main reason(s) for today's visit: Establish Care (Former Dr Quinonez Pt ) and Hypothyroidism (Medication refill)      HPI:    Patient is a 62 y.o. female who is here to establish care. Her mother is a patient here. Her  recently established recently.     Chronic hypothyroidism:  On stable dose of levothyroxine, no changes in physical symptoms, weight gain  Lab Results   Component Value Date    TSH 3.610 11/28/2016    TSH 2.980 05/26/2016     Takes lorazepam 0.5 mg sparingly as needed for episodes of severe anxiety but denies generalized anxiety or history of major depression. There is strong family h/o depression in her mother and sisters.     C/o intermittent bouts of bowel urgency during/after meals. This happens quite frequently. Denies blood in stool, abnormal weight loss. Has never tried a dairy free or gluten free diet. Last colonoscopy was 2009 for certain but thinks she may have had a more recent study.       Patient Care Team:  Darrian Hallman MD as PCP - General (Internal Medicine)  Mariam Machado MD as Consulting Physician (Dermatology)  Niranjan Ramos MD as Consulting Physician (Obstetrics and Gynecology)  ____________________________________________________________________    ASSESSMENT & PLAN:    Problem List Items Addressed This Visit     Hypothyroidism - Primary (Chronic)    Overview     *Name brand Synthroid only         Current Assessment & Plan     Check TFTs and titrate dose as needed.          Relevant " Medications    SYNTHROID 88 MCG tablet    Other Relevant Orders    TSH+Free T4    Comprehensive Metabolic Panel    Compliance Drug Analysis, Ur - Urine, Clean Catch    Functional diarrhea (Chronic)    Current Assessment & Plan     Check celiac serology and advised to try complete lactose/dairy free diet for 1 month to see if this helps.          Relevant Orders    Celiac Ab tTG DGP TIgA    Other specified anxiety disorders (Chronic)    Overview     Has been on lorazepam taken as needed for episodic anxiety         Current Assessment & Plan     May continue lorazepam 0.5 mg sparingly as needed for anxiety.  Consent / agreement signed. Leonardo requested. UDS ordered.   Rx printed for lorazepam 0.5 mg qd prn anxiety #30, 2 refills.          Relevant Medications    LORazepam (ATIVAN) 0.5 MG tablet      Other Visit Diagnoses     Encounter for screening for osteoporosis        Relevant Orders    DEXA Bone Density Axial           Summary/Discussion:     ·       Return in about 6 months (around 5/20/2018) for Annual physical.    Future Appointments  Date Time Provider Department Center   12/15/2017 3:10 PM MD [facility] None   5/15/2018 9:00 AM [facility] None   5/22/2018 9:00 AM MD [facility] None       ____________________________________________________________________        REVIEW OF SYSTEMS    Review of Systems   Constitutional: Positive for unexpected weight change.   HENT: Negative.    Eyes: Negative.    Respiratory: Negative.    Cardiovascular: Negative.    Gastrointestinal: Positive for diarrhea. Negative for blood in stool and constipation.   Endocrine: Negative.    Genitourinary: Negative.    Musculoskeletal: Negative.    Skin: Negative.    Allergic/Immunologic: Negative.    Neurological: Negative.    Hematological: Negative.    Psychiatric/Behavioral: The patient is nervous/anxious.          PHYSICAL EXAMINATION    Physical Exam   Constitutional: She is  oriented to person, place, and time. She appears well-developed and well-nourished. No distress.   HENT:   Head: Normocephalic and atraumatic.   Right Ear: Tympanic membrane and external ear normal.   Left Ear: Tympanic membrane and external ear normal.   Mouth/Throat: Oropharynx is clear and moist and mucous membranes are normal. No oral lesions.   Eyes: Conjunctivae are normal. Pupils are equal, round, and reactive to light. No scleral icterus.   Neck: Neck supple. No tracheal deviation present. No thyroid mass and no thyromegaly present.   Cardiovascular: Normal rate, regular rhythm, normal heart sounds and intact distal pulses.    Pulmonary/Chest: Effort normal and breath sounds normal.   Abdominal: Soft. Bowel sounds are normal. She exhibits no distension and no mass. There is no tenderness. No hernia.   Musculoskeletal: She exhibits no edema.   Lymphadenopathy:     She has no cervical adenopathy.        Right: No supraclavicular adenopathy present.        Left: No supraclavicular adenopathy present.   Neurological: She is alert and oriented to person, place, and time. She has normal reflexes. No cranial nerve deficit. She exhibits normal muscle tone. Coordination normal.   Skin: Skin is warm. No rash noted. No pallor.   Psychiatric: She has a normal mood and affect. Her behavior is normal. Judgment and thought content normal.       REVIEWED DATA:    Labs:   Lab Results   Component Value Date     06/21/2017    K 4.1 06/21/2017    AST 47 (H) 06/21/2017    ALT 47 (H) 06/21/2017    BUN 12 06/21/2017    CREATININE 0.69 06/21/2017    CREATININE 0.79 06/16/2017    CREATININE 0.70 06/08/2017    EGFRIFNONA 86 06/21/2017       No results found for: GLU, HGBA1C, MICROALBUR    No results found for: LDL, HDL, TRIG, CHOLHDLRATIO    Lab Results   Component Value Date    TSH 3.610 11/28/2016          Lab Results   Component Value Date    WBC 10.48 06/21/2017    HGB 12.4 06/21/2017    HGB 13.5 06/16/2017    HGB 12.7  02/13/2015     06/21/2017         Imaging:   CT ABDOMEN AND PELVIS WITH CONTRAST-      CLINICAL INFORMATION:  Right-sided abdominal pain, history of melanoma.      COMPARISON: 07/09/2009      FINDINGS: Tiny gallbladder polyp is reidentified, the gallbladder is  otherwise within normal limits. No biliary duct dilatation. The liver,  pancreas, spleen, adrenal glands and kidneys are within normal limits.  Caliber of the aorta is normal. There is bladder wall thickening  diffusely. Vaginal cuff is typical post hysterectomy. Nominal  diverticulosis of the colon, there is no indication of diverticulitis.  The appendix and small bowel have a satisfactory appearance. The lung  bases are within normal limits. No lytic or sclerotic bone lesion.      CONCLUSION:  1. Tiny gallbladder polyp is reidentified.   2. Nominal sigmoid diverticulosis.  3. No acute intra-abdominal abnormality or indication of metastatic  disease.      This report was finalized on 6/10/2017      Medical Tests:   EKG 12/15/17: sinus, no change from priro     Summary of old records / correspondence / consultant report:   General surgery note from 6/2017: s/p lap lexus, ERCP with benign GB polyps    Request outside records:        ______________________________________________________________________    ALLERGIES  Allergies   Allergen Reactions   • Penicillins Hives        MEDICATIONS  Current Outpatient Prescriptions   Medication Sig Dispense Refill   • ibuprofen (ADVIL,MOTRIN) 200 MG tablet Take 100 mg by mouth Every 6 (Six) Hours As Needed for Mild Pain (1-3).     • LORazepam (ATIVAN) 0.5 MG tablet Take 1 tablet by mouth Daily As Needed for Anxiety. 30 tablet 2   • SYNTHROID 88 MCG tablet TAKE 1 TABLET DAILY 90 tablet 0     No current facility-administered medications for this visit.        PFSH:     The following portions of the patient's history were reviewed and updated as appropriate: Allergies / Current Medications / Past Medical History /  Surgical History / Social History / Family History    PROBLEM LIST   Patient Active Problem List   Diagnosis   • Hypothyroidism   • Other specified anxiety disorders   • Functional diarrhea       PAST MEDICAL HISTORY  Past Medical History:   Diagnosis Date   • Anxiety    • Arthritis    • History of shingles 06/2017    on right breast   • Hypothyroidism    • Melanoma 2011    upper chest, followed by Dr. Mariam Eldridge    • Postpartum hemorrhage    • Sigmoid diverticulosis    • Surgical complication     excessive bleeding during surgical procedures       SURGICAL HISTORY  Past Surgical History:   Procedure Laterality Date   • ANAL SPHINCTEROPLASTY N/A 1984   • CHOLECYSTECTOMY WITH INTRAOPERATIVE CHOLANGIOGRAM N/A 6/20/2017    Procedure: LAPAROSCOPIC CHOLECYSTECTOMY WITH INTRAOPERATIVE CHOLANGIOGRAM;  Surgeon: Jessica Cannon MD;  Location: [facility] MAIN OR;  Service:    • COLONOSCOPY N/A 2010    normal colonoscopy, done at [facility]   • [place] ERCP DX COLLECTION SPECIMEN BRUSHING/WASHING N/A 6/21/2017    Procedure: ENDOSCOPIC RETROGRADE CHOLANGIOPANCREATOGRAPHY with sphinterotomy and ballon duct sweep;  Surgeon: Pastor Stanton MD;  Location: [facility] ENDOSCOPY;  Service: Gastroenterology   • SKIN CANCER EXCISION N/A 2011    Melanoma of the upper chest excision   • TOTAL LAPAROSCOPIC HYSTERECTOMY SALPINGO OOPHORECTOMY Bilateral 1999    Dr. 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)      Social History Main Topics   • Smoking status: Never Smoker   • Smokeless tobacco: Never Used   • Alcohol use 1.8 oz/week     3 Cans of beer per week      Comment: socially    • Drug use: No   • Sexual activity: Yes     Partners: Male     Other Topics Concern   •  None     Social History Narrative       FAMILY HISTORY  Family History   Problem Relation Age of Onset   • Arthritis Mother    • Hypertension Mother    • Osteoporosis Mother    • Bleeding Disorder Mother    • Depression Mother    • Coronary artery disease Mother    • Atrial fibrillation Mother    • Polymyalgia rheumatica Mother    • Colon cancer Mother    • Prostate cancer Father    • Liver cancer Father    • Diabetes Father    • Glaucoma Father    • Colon polyps Father    • Thyroid disease Sister    • Heart disease Paternal Grandfather          **Jorge Disclaimer:   Much of this encounter note is an electronic transcription/translation of spoken language to printed text. The electronic translation of spoken language may permit erroneous, or at times, nonsensical words or phrases to be inadvertently transcribed. Although I have reviewed the note for such errors, some may still exist.

## 2017-11-24 LAB
ALBUMIN SERPL-MCNC: 4.5 G/DL (ref 3.6–4.8)
ALBUMIN/GLOB SERPL: 2 {RATIO} (ref 1.2–2.2)
ALP SERPL-CCNC: 55 IU/L (ref 39–117)
ALT SERPL-CCNC: 16 IU/L (ref 0–32)
AST SERPL-CCNC: 19 IU/L (ref 0–40)
BILIRUB SERPL-MCNC: 0.3 MG/DL (ref 0–1.2)
BUN SERPL-MCNC: 19 MG/DL (ref 8–27)
BUN/CREAT SERPL: 24 (ref 12–28)
CALCIUM SERPL-MCNC: 8.8 MG/DL (ref 8.7–10.3)
CHLORIDE SERPL-SCNC: 102 MMOL/L (ref 96–106)
CO2 SERPL-SCNC: 24 MMOL/L (ref 18–29)
CREAT SERPL-MCNC: 0.78 MG/DL (ref 0.57–1)
DRUGS UR: NORMAL
GFR SERPLBLD CREATININE-BSD FMLA CKD-EPI: 82 ML/MIN/1.73
GFR SERPLBLD CREATININE-BSD FMLA CKD-EPI: 94 ML/MIN/1.73
GLOBULIN SER CALC-MCNC: 2.3 G/DL (ref 1.5–4.5)
GLUCOSE SERPL-MCNC: 89 MG/DL (ref 65–99)
POTASSIUM SERPL-SCNC: 3.9 MMOL/L (ref 3.5–5.2)
PROT SERPL-MCNC: 6.8 G/DL (ref 6–8.5)
SODIUM SERPL-SCNC: 142 MMOL/L (ref 134–144)
T4 FREE SERPL-MCNC: 1.2 NG/DL (ref 0.82–1.77)
TSH SERPL DL<=0.005 MIU/L-ACNC: 8.13 UIU/ML (ref 0.45–4.5)

## 2017-11-28 DIAGNOSIS — E03.9 HYPOTHYROIDISM, UNSPECIFIED TYPE: Primary | Chronic | ICD-10-CM

## 2017-11-28 RX ORDER — LEVOTHYROXINE SODIUM 100 MCG
100 TABLET ORAL DAILY
Qty: 30 TABLET | Refills: 2 | Status: SHIPPED | OUTPATIENT
Start: 2017-11-28 | End: 2017-11-29 | Stop reason: SDUPTHER

## 2017-11-28 RX ORDER — LEVOTHYROXINE SODIUM 0.1 MG/1
100 TABLET ORAL DAILY
Qty: 30 TABLET | Refills: 2 | Status: SHIPPED | OUTPATIENT
Start: 2017-11-28 | End: 2017-11-28

## 2017-11-28 NOTE — TELEPHONE ENCOUNTER
Pt to  100 mcg synthroid and recheck labs in 2 months  / TFT's calcium vitamin D and LFT's per Dr Hallman. ALLIE

## 2017-11-28 NOTE — ASSESSMENT & PLAN NOTE
Pt to increase to 100 mcg synthroid from 88 mcg and recheck TFT's along with calcium, vitamin D and LFT's in 2 months. ALLIE

## 2017-11-28 NOTE — PROGRESS NOTES
Call patient with her test result(s) and mail the results to her if MyChart is NOT active.    1. Thyroid dose needs to be adjusted up: Increase Synthroid to 100 mcg qAM (confirm on 88 mcg Synthroid). (NO SUBSTITUTE/GIULIA). Recheck TFT's in 2 months.   2. Calcium is little low. Check vitamin D level along with next TFT's (dx: low calcium).   3. Very mild elevated LFT's. Recheck with other labs in 2 months. (dx: elevated LFT)

## 2017-11-29 DIAGNOSIS — E03.9 HYPOTHYROIDISM, UNSPECIFIED TYPE: Primary | Chronic | ICD-10-CM

## 2017-11-29 RX ORDER — LEVOTHYROXINE SODIUM 100 MCG
100 TABLET ORAL DAILY
Qty: 90 TABLET | Refills: 0 | Status: SHIPPED | OUTPATIENT
Start: 2017-11-29 | End: 2017-11-30 | Stop reason: SDUPTHER

## 2017-11-30 DIAGNOSIS — E03.9 HYPOTHYROIDISM, UNSPECIFIED TYPE: Chronic | ICD-10-CM

## 2017-11-30 RX ORDER — LEVOTHYROXINE SODIUM 0.1 MG/1
100 TABLET ORAL DAILY
Qty: 90 TABLET | Refills: 0 | Status: SHIPPED | OUTPATIENT
Start: 2017-11-30 | End: 2018-01-25 | Stop reason: SDUPTHER

## 2017-12-12 ENCOUNTER — HOSPITAL ENCOUNTER (OUTPATIENT)
Dept: BONE DENSITY | Facility: HOSPITAL | Age: 62
Discharge: HOME | End: 2017-12-12
Admitting: INTERNAL MEDICINE

## 2017-12-12 PROCEDURE — 77080 DXA BONE DENSITY AXIAL: CPT

## 2017-12-13 ENCOUNTER — OFFICE VISIT (OUTPATIENT)
Dept: INTERNAL MEDICINE | Age: 62
End: 2017-12-13

## 2017-12-13 VITALS
SYSTOLIC BLOOD PRESSURE: 116 MMHG | OXYGEN SATURATION: 96 % | TEMPERATURE: 100.9 F | WEIGHT: 134 LBS | HEART RATE: 112 BPM | DIASTOLIC BLOOD PRESSURE: 76 MMHG | BODY MASS INDEX: 23.74 KG/M2 | HEIGHT: 63 IN

## 2017-12-13 DIAGNOSIS — J11.1 INFLUENZA: Primary | ICD-10-CM

## 2017-12-13 DIAGNOSIS — R52 BODY ACHES: Primary | ICD-10-CM

## 2017-12-13 LAB
EXPIRATION DATE: ABNORMAL
FLUAV AG NPH QL: POSITIVE
FLUBV AG NPH QL: NEGATIVE
INTERNAL CONTROL: ABNORMAL
Lab: ABNORMAL

## 2017-12-13 PROCEDURE — 87804 INFLUENZA ASSAY W/OPTIC: CPT | Performed by: INTERNAL MEDICINE

## 2017-12-13 PROCEDURE — 99213 OFFICE O/P EST LOW 20 MIN: CPT | Performed by: INTERNAL MEDICINE

## 2017-12-13 RX ORDER — OSELTAMIVIR PHOSPHATE 75 MG/1
75 CAPSULE ORAL 2 TIMES DAILY
Qty: 10 CAPSULE | Refills: 0 | Status: SHIPPED | OUTPATIENT
Start: 2017-12-13 | End: 2017-12-18

## 2017-12-13 NOTE — PROGRESS NOTES
"[facility]  SCOTT HALLMAN M.D.      Gwendolyn Lindo Kovats / 62 y.o. / female  12/13/2017      MEDICATIONS  Current Outpatient Prescriptions   Medication Sig Dispense Refill   • ibuprofen (ADVIL,MOTRIN) 200 MG tablet Take 100 mg by mouth Every 6 (Six) Hours As Needed for Mild Pain (1-3).     • levothyroxine (SYNTHROID) 100 MCG tablet Take 1 tablet by mouth Daily. Please take on empty stomach in the am do not eat or drink for 1 hour 90 tablet 0   • LORazepam (ATIVAN) 0.5 MG tablet Take 1 tablet by mouth Daily As Needed for Anxiety. 30 tablet 2       VITALS    Visit Vitals   • /76   • Pulse 112   • Temp (!) 100.9 °F (38.3 °C)   • Ht 160 cm (62.99\")   • Wt 60.8 kg (134 lb)   • SpO2 96%   • BMI 23.74 kg/m2       BP Readings from Last 3 Encounters:   12/13/17 116/76   11/20/17 110/70   06/21/17 107/63     Wt Readings from Last 3 Encounters:   12/13/17 60.8 kg (134 lb)   11/20/17 60.8 kg (134 lb)   06/20/17 60 kg (132 lb 3 oz)      Body mass index is 23.74 kg/(m^2).      CC:  Main reason(s) for today's visit: Generalized Body Aches (x 1 week); Fever; Leg Pain ( up to hip ); and Flank Pain (right side )      HPI:     Since Monday has had fever, generalized achiness. Had flu shot this winter. No sick contact.  No n/v/d. No productive cough or sob.     H/o melanoma of chest. C/o right lower chest wall tenderness (prior h/o cholecystectomy). Not made worse w/ eating. Hurts when applying pressure to that area. C/o right lower leg soreness.     Patient Care Team:  Darrian Hallman MD as PCP - General (Internal Medicine)  Mariam Machado MD as Consulting Physician (Dermatology)  Niranjan Ramos MD as Consulting Physician (Obstetrics and Gynecology)  ____________________________________________________________________    ASSESSMENT & PLAN:    1. Influenza  - oseltamivir (TAMIFLU) 75 MG capsule; Take 1 capsule by mouth 2 (Two) Times a Day for 5 days.  Dispense: 10 capsule; Refill: - advil prn, fluid, rest  - Rx sent to " pharmacy for her     2. Right lower rib/right leg pain (This is a new problem to this examiner.)  - doubt related to melanoma hx however if persists can check xrays or bone scan   - She expressed understanding and agreed to follow above instructions.        Summary/Discussion:  ·     No Follow-up on file.    Future Appointments  Date Time Provider Department Center   1/24/2018 8:40 AM [facility] None   5/15/2018 9:00 AM [facility] None   5/22/2018 9:00 AM Darrian Hallman MD [facility] None     ____________________________________________________________________    REVIEW OF SYSTEMS    Review of Systems  Fever and achiness, malaise  Body aches  Gi neg    PHYSICAL EXAMINATION    Physical Exam  Looks ill (not toxic)  Lungs clear  Heart sounds normal  Alert, normal judgment  Right lower rib area with mild tenderness  Mild soreness of right anterior lower leg    REVIEWED DATA:    Labs:   Orders Only on 12/13/2017   Component Date Value Ref Range Status   • Rapid Influenza A Ag 12/13/2017 positive   Final   • Rapid Influenza B Ag 12/13/2017 negative   Final   • Internal Control 12/13/2017 Passed  Passed Final   • Lot Number 12/13/2017 [id number]   Final   • Expiration Date 12/13/2017 4/2019   Final        Imaging:        Medical Tests:        Summary of old records / correspondence / consultant report:        Request outside records:       ALLERGIES  Allergies   Allergen Reactions   • Penicillins Hives        PFSH:     The following portions of the patient's history were reviewed and updated as appropriate: Allergies / Current Medications / Past Medical History / Surgical History / Social History / Family History    PROBLEM LIST   Patient Active Problem List   Diagnosis   • Hypothyroidism   • Other specified anxiety disorders   • Functional diarrhea       PAST MEDICAL HISTORY  Past Medical History:   Diagnosis Date   • Anxiety    • Arthritis    • History of shingles 06/2017    on  right breast   • Hypothyroidism    • Melanoma 2011    upper chest, followed by Dr. Mariam Eldridge    • Postpartum hemorrhage    • Sigmoid diverticulosis    • Surgical complication     excessive bleeding during surgical procedures       SURGICAL HISTORY  Past Surgical History:   Procedure Laterality Date   • ANAL SPHINCTEROPLASTY N/A 1984   • CHOLECYSTECTOMY WITH INTRAOPERATIVE CHOLANGIOGRAM N/A 6/20/2017    Procedure: LAPAROSCOPIC CHOLECYSTECTOMY WITH INTRAOPERATIVE CHOLANGIOGRAM;  Surgeon: Jessica Cannon MD;  Location: [facility] MAIN OR;  Service:    • COLONOSCOPY N/A 2010    normal colonoscopy, done at [facility]   • [place] ER[place] COLLECTION SPECIMEN BRUSHING/WASHING N/A 6/21/2017    Procedure: ENDOSCOPIC RETROGRADE CHOLANGIOPANCREATOGRAPHY with sphinterotomy and ballon duct sweep;  Surgeon: Pastor Stanton MD;  Location: [facility] ENDOSCOPY;  Service: Gastroenterology   • SKIN CANCER EXCISION N/A 2011    Melanoma of the upper chest excision   • TOTAL LAPAROSCOPIC HYSTERECTOMY SALPINGO OOPHORECTOMY Bilateral 1999    Dr. 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)      Social History Main Topics   • Smoking status: Never Smoker   • Smokeless tobacco: Never Used   • Alcohol use 1.8 oz/week     3 Cans of beer per week      Comment: socially    • Drug use: No   • Sexual activity: Yes     Partners: Male     Other Topics Concern   • None     Social History Narrative       FAMILY HISTORY  Family History   Problem Relation Age of Onset   • Arthritis Mother    • Hypertension Mother    • Osteoporosis Mother    • Bleeding Disorder Mother    • Depression Mother    • Coronary artery disease Mother    • Atrial fibrillation Mother    • Polymyalgia rheumatica  Mother    • Colon cancer Mother    • Prostate cancer Father    • Liver cancer Father    • Diabetes Father    • Glaucoma Father    • Colon polyps Father    • Thyroid disease Sister    • Heart disease Paternal Grandfather          **Jorge Disclaimer:   Much of this encounter note is an electronic transcription/translation of spoken language to printed text. The electronic translation of spoken language may permit erroneous, or at times, nonsensical words or phrases to be inadvertently transcribed. Although I have reviewed the note for such errors, some may still exist.

## 2017-12-21 ENCOUNTER — HOSPITAL ENCOUNTER (OUTPATIENT)
Dept: GENERAL RADIOLOGY | Facility: HOSPITAL | Age: 62
Discharge: HOME | End: 2017-12-21
Admitting: NURSE PRACTITIONER

## 2017-12-21 ENCOUNTER — OFFICE VISIT (OUTPATIENT)
Dept: INTERNAL MEDICINE | Age: 62
End: 2017-12-21

## 2017-12-21 VITALS
HEART RATE: 78 BPM | DIASTOLIC BLOOD PRESSURE: 68 MMHG | HEIGHT: 63 IN | BODY MASS INDEX: 23.46 KG/M2 | WEIGHT: 132.4 LBS | TEMPERATURE: 97.9 F | OXYGEN SATURATION: 98 % | SYSTOLIC BLOOD PRESSURE: 112 MMHG

## 2017-12-21 DIAGNOSIS — R05.9 COUGH: ICD-10-CM

## 2017-12-21 DIAGNOSIS — G93.31 POST-INFLUENZA SYNDROME: Primary | ICD-10-CM

## 2017-12-21 LAB
BASOPHILS # BLD AUTO: 0.02 10*3/MM3 (ref 0–0.2)
BASOPHILS NFR BLD AUTO: 0.2 % (ref 0–1.5)
EOSINOPHIL # BLD AUTO: 0.24 10*3/MM3 (ref 0–0.7)
EOSINOPHIL NFR BLD AUTO: 2.9 % (ref 0.3–6.2)
ERYTHROCYTE [DISTWIDTH] IN BLOOD BY AUTOMATED COUNT: 12.8 % (ref 11.7–13)
HCT VFR BLD AUTO: 38.7 % (ref 35.6–45.5)
HGB BLD-MCNC: 12.6 G/DL (ref 11.9–15.5)
IMM GRANULOCYTES # BLD: 0.03 10*3/MM3 (ref 0–0.03)
IMM GRANULOCYTES NFR BLD: 0.4 % (ref 0–0.5)
LYMPHOCYTES # BLD AUTO: 2.46 10*3/MM3 (ref 0.9–4.8)
LYMPHOCYTES NFR BLD AUTO: 30.1 % (ref 19.6–45.3)
MCH RBC QN AUTO: 31.4 PG (ref 26.9–32)
MCHC RBC AUTO-ENTMCNC: 32.6 G/DL (ref 32.4–36.3)
MCV RBC AUTO: 96.5 FL (ref 80.5–98.2)
MONOCYTES # BLD AUTO: 0.73 10*3/MM3 (ref 0.2–1.2)
MONOCYTES NFR BLD AUTO: 8.9 % (ref 5–12)
NEUTROPHILS # BLD AUTO: 4.68 10*3/MM3 (ref 1.9–8.1)
NEUTROPHILS NFR BLD AUTO: 57.5 % (ref 42.7–76)
PLATELET # BLD AUTO: 259 10*3/MM3 (ref 140–500)
RBC # BLD AUTO: 4.01 10*6/MM3 (ref 3.9–5.2)
WBC # BLD AUTO: 8.16 10*3/MM3 (ref 4.5–10.7)

## 2017-12-21 PROCEDURE — 99213 OFFICE O/P EST LOW 20 MIN: CPT | Performed by: NURSE PRACTITIONER

## 2017-12-21 PROCEDURE — 71020 HC CHEST PA AND LATERAL: CPT

## 2017-12-21 NOTE — PATIENT INSTRUCTIONS
Cough, Adult  Coughing is a reflex that clears your throat and your airways. Coughing helps to heal and protect your lungs. It is normal to cough occasionally, but a cough that happens with other symptoms or lasts a long time may be a sign of a condition that needs treatment. A cough may last only 2-3 weeks (acute), or it may last longer than 8 weeks (chronic).  CAUSES  Coughing is commonly caused by:  · Breathing in substances that irritate your lungs.  · A viral or bacterial respiratory infection.  · Allergies.  · Asthma.  · Postnasal drip.  · Smoking.  · Acid backing up from the stomach into the esophagus (gastroesophageal reflux).  · Certain medicines.  · Chronic lung problems, including COPD (or rarely, lung cancer).  · Other medical conditions such as heart failure.  HOME CARE INSTRUCTIONS   Pay attention to any changes in your symptoms. Take these actions to help with your discomfort:  · Take medicines only as told by your health care provider.    If you were prescribed an antibiotic medicine, take it as told by your health care provider. Do not stop taking the antibiotic even if you start to feel better.    Talk with your health care provider before you take a cough suppressant medicine.  · Drink enough fluid to keep your urine clear or pale yellow.  · If the air is dry, use a cold steam vaporizer or humidifier in your bedroom or your home to help loosen secretions.  · Avoid anything that causes you to cough at work or at home.  · If your cough is worse at night, try sleeping in a semi-upright position.  · Avoid cigarette smoke. If you smoke, quit smoking. If you need help quitting, ask your health care provider.  · Avoid caffeine.  · Avoid alcohol.  · Rest as needed.  SEEK MEDICAL CARE IF:   · You have new symptoms.  · You cough up pus.  · Your cough does not get better after 2-3 weeks, or your cough gets worse.  · You cannot control your cough with suppressant medicines and you are losing sleep.  · You  develop pain that is getting worse or pain that is not controlled with pain medicines.  · You have a fever.  · You have unexplained weight loss.  · You have night sweats.  SEEK IMMEDIATE MEDICAL CARE IF:  · You cough up blood.  · You have difficulty breathing.  · Your heartbeat is very fast.     This information is not intended to replace advice given to you by your health care provider. Make sure you discuss any questions you have with your health care provider.     Document Released: 06/15/2012 Document Revised: 09/07/2016 Document Reviewed: 02/24/2016  [employer] Interactive Patient Education ©2017 [employer]

## 2017-12-21 NOTE — PROGRESS NOTES
Subjective   Gwendolyn Huddleston is a 62 y.o. female.     Cough   This is a new problem. The current episode started 1 to 4 weeks ago. The problem has been unchanged. The problem occurs constantly. The cough is productive of sputum. Associated symptoms include shortness of breath. Pertinent negatives include no chest pain, chills, fever (one week ago), myalgias, nasal congestion or wheezing. Nothing aggravates the symptoms. Treatments tried: Delsym. The treatment provided mild relief. Her past medical history is significant for pneumonia (15-20 years ago). There is no history of asthma or COPD.    She was diagnosed with influenza A 8 days ago. Reports the cough has new onset in the past 5 days.     The following portions of the patient's history were reviewed and updated as appropriate: allergies, current medications, past family history, past medical history, past social history, past surgical history and problem list.    Review of Systems   Constitutional: Negative for chills, fatigue and fever (one week ago).   Respiratory: Positive for cough and shortness of breath. Negative for chest tightness and wheezing.    Cardiovascular: Negative for chest pain and leg swelling.   Musculoskeletal: Negative for myalgias.       Objective   Physical Exam   Constitutional: She is oriented to person, place, and time. Vital signs are normal. She appears well-developed and well-nourished. She is cooperative. She does not appear ill. No distress.   HENT:   Head: Normocephalic and atraumatic.   Right Ear: Hearing, tympanic membrane, external ear and ear canal normal. No drainage or swelling. Tympanic membrane is not injected and not erythematous. No middle ear effusion.   Left Ear: Hearing, tympanic membrane, external ear and ear canal normal. No drainage or swelling. Tympanic membrane is not injected and not erythematous.  No middle ear effusion.   Nose: Nose normal.   Mouth/Throat: Uvula is midline, oropharynx is clear and moist  and mucous membranes are normal. No tonsillar exudate.   Cardiovascular: Normal rate, regular rhythm and normal heart sounds.    No murmur heard.  Pulmonary/Chest: Effort normal and breath sounds normal. She has no decreased breath sounds. She has no wheezes. She has no rhonchi. She has no rales.   Lymphadenopathy:     She has no cervical adenopathy.        Right cervical: No superficial cervical, no deep cervical and no posterior cervical adenopathy present.       Left cervical: No superficial cervical, no deep cervical and no posterior cervical adenopathy present.   Neurological: She is alert and oriented to person, place, and time.   Skin: Skin is warm, dry and intact.   Psychiatric: She has a normal mood and affect. Her speech is normal and behavior is normal. Judgment and thought content normal. Cognition and memory are normal.   Nursing note and vitals reviewed.      Assessment/Plan   Problems Addressed this Visit     None      Visit Diagnoses     Post-influenza syndrome    -  Primary    Relevant Orders    XR Chest PA & Lateral    CBC & Differential    Cough        Relevant Orders    XR Chest PA & Lateral    CBC & Differential        1. Post-influenza syndrome  Suspect postviral cough, lung assessment within normal limits.  However, considering patient had influenza last week and has a previous history of pneumonia, I will obtain chest x-ray and CBC today to rule out early evolving pneumonia.  Discussed with patient that I would continue use of Delsym as needed for the cough, follow-up with me via phone if cough worsens or does not improve.    - XR Chest PA & Lateral  - CBC & Differential    2. Cough    - XR Chest PA & Lateral  - CBC & Differential

## 2018-01-23 DIAGNOSIS — R79.89 ELEVATED LIVER FUNCTION TESTS: ICD-10-CM

## 2018-01-23 DIAGNOSIS — E83.51 HYPOCALCEMIA: ICD-10-CM

## 2018-01-23 DIAGNOSIS — E03.9 HYPOTHYROIDISM, UNSPECIFIED TYPE: Primary | Chronic | ICD-10-CM

## 2018-01-24 LAB
25(OH)D3+25(OH)D2 SERPL-MCNC: 31.7 NG/ML (ref 30–100)
ALBUMIN SERPL-MCNC: 4.5 G/DL (ref 3.5–5.2)
ALP SERPL-CCNC: 51 U/L (ref 39–117)
ALT SERPL-CCNC: 13 U/L (ref 1–33)
AST SERPL-CCNC: 15 U/L (ref 1–32)
BILIRUB DIRECT SERPL-MCNC: <0.2 MG/DL (ref 0–0.3)
BILIRUB SERPL-MCNC: 0.5 MG/DL (ref 0.1–1.2)
PROT SERPL-MCNC: 6.9 G/DL (ref 6–8.5)
T4 FREE SERPL-MCNC: 1.4 NG/DL (ref 0.93–1.7)
TSH SERPL DL<=0.005 MIU/L-ACNC: 3.09 MIU/ML (ref 0.27–4.2)

## 2018-01-25 ENCOUNTER — TELEPHONE (OUTPATIENT)
Dept: INTERNAL MEDICINE | Age: 63
End: 2018-01-25

## 2018-01-25 DIAGNOSIS — E03.9 HYPOTHYROIDISM, UNSPECIFIED TYPE: Primary | Chronic | ICD-10-CM

## 2018-01-25 DIAGNOSIS — E03.9 HYPOTHYROIDISM, UNSPECIFIED TYPE: Chronic | ICD-10-CM

## 2018-01-25 RX ORDER — LEVOTHYROXINE SODIUM 0.1 MG/1
100 TABLET ORAL DAILY
Qty: 60 TABLET | Refills: 0 | Status: SHIPPED | OUTPATIENT
Start: 2018-01-25 | End: 2018-01-30 | Stop reason: SDUPTHER

## 2018-01-25 NOTE — TELEPHONE ENCOUNTER
Pt informed of test result will call back to schedule labs in two months meds sent to local pharmacy. ALLIE

## 2018-01-25 NOTE — PROGRESS NOTES
Call patient with her test result(s) and mail the results to her if MyChart is NOT active.    Increase levothyroxine to 112 mcg (confirm on 100 mcg). Recheck TFT's in 2 months.

## 2018-01-25 NOTE — TELEPHONE ENCOUNTER
----- Message from Darrian Hallman MD sent at 1/25/2018  7:42 AM EST -----  Call patient with her test result(s) and mail the results to her if MyChart is NOT active.    Increase levothyroxine to 112 mcg (confirm on 100 mcg). Recheck TFT's in 2 months.

## 2018-01-25 NOTE — ASSESSMENT & PLAN NOTE
Pt to increase levothyroxine to 112 mcg from 100mcg daily per Dr Hallman and recheck labs in two months. ALLIE

## 2018-01-30 DIAGNOSIS — E03.9 HYPOTHYROIDISM, UNSPECIFIED TYPE: Chronic | ICD-10-CM

## 2018-01-30 RX ORDER — LEVOTHYROXINE SODIUM 112 UG/1
112 TABLET ORAL DAILY
Qty: 90 TABLET | Refills: 0 | Status: SHIPPED | OUTPATIENT
Start: 2018-01-30 | End: 2018-01-31 | Stop reason: SDUPTHER

## 2018-01-31 ENCOUNTER — TELEPHONE (OUTPATIENT)
Dept: INTERNAL MEDICINE | Age: 63
End: 2018-01-31

## 2018-01-31 DIAGNOSIS — E03.9 HYPOTHYROIDISM, UNSPECIFIED TYPE: Chronic | ICD-10-CM

## 2018-01-31 RX ORDER — LEVOTHYROXINE SODIUM 112 UG/1
112 TABLET ORAL DAILY
Qty: 90 TABLET | Refills: 0 | Status: SHIPPED | OUTPATIENT
Start: 2018-01-31 | End: 2018-04-15 | Stop reason: SDUPTHER

## 2018-01-31 NOTE — TELEPHONE ENCOUNTER
Pls send the 90-day rx of Levothyroxine 112mcg to [facility]. Pls note on the rx: generic form b/c if not they will charge pt a higher cost.    [facility] #[phone].    The 90-day rx was sent to [facility] p/pt it is more expensive at the local vs the mailorder.

## 2018-03-08 ENCOUNTER — OFFICE VISIT (OUTPATIENT)
Dept: INTERNAL MEDICINE | Age: 63
End: 2018-03-08

## 2018-03-08 VITALS
DIASTOLIC BLOOD PRESSURE: 72 MMHG | HEIGHT: 63 IN | TEMPERATURE: 97.7 F | HEART RATE: 78 BPM | OXYGEN SATURATION: 95 % | BODY MASS INDEX: 23.74 KG/M2 | WEIGHT: 134 LBS | SYSTOLIC BLOOD PRESSURE: 114 MMHG

## 2018-03-08 DIAGNOSIS — M62.838 MUSCLE SPASMS OF NECK: ICD-10-CM

## 2018-03-08 DIAGNOSIS — E03.9 HYPOTHYROIDISM, UNSPECIFIED TYPE: Chronic | ICD-10-CM

## 2018-03-08 DIAGNOSIS — R07.89 LEFT-SIDED CHEST WALL PAIN: Primary | ICD-10-CM

## 2018-03-08 LAB
T4 FREE SERPL-MCNC: 1.44 NG/DL (ref 0.93–1.7)
TSH SERPL DL<=0.005 MIU/L-ACNC: 0.47 MIU/ML (ref 0.27–4.2)

## 2018-03-08 PROCEDURE — 99214 OFFICE O/P EST MOD 30 MIN: CPT | Performed by: INTERNAL MEDICINE

## 2018-03-08 RX ORDER — COVID-19 ANTIGEN TEST
KIT MISCELLANEOUS
COMMUNITY
Start: 2018-03-08 | End: 2018-03-15

## 2018-03-26 ENCOUNTER — RESULTS ENCOUNTER (OUTPATIENT)
Dept: INTERNAL MEDICINE | Age: 63
End: 2018-03-26

## 2018-03-26 DIAGNOSIS — E03.9 HYPOTHYROIDISM, UNSPECIFIED TYPE: Chronic | ICD-10-CM

## 2018-04-15 DIAGNOSIS — E03.9 HYPOTHYROIDISM, UNSPECIFIED TYPE: Chronic | ICD-10-CM

## 2018-04-16 RX ORDER — LEVOTHYROXINE SODIUM 112 UG/1
TABLET ORAL
Qty: 90 TABLET | Refills: 0 | Status: SHIPPED | OUTPATIENT
Start: 2018-04-16 | End: 2018-07-09 | Stop reason: SDUPTHER

## 2018-07-09 DIAGNOSIS — E03.9 HYPOTHYROIDISM, UNSPECIFIED TYPE: Chronic | ICD-10-CM

## 2018-07-09 RX ORDER — LEVOTHYROXINE SODIUM 112 MCG
TABLET ORAL
Qty: 90 TABLET | Refills: 0 | Status: SHIPPED | OUTPATIENT
Start: 2018-07-09 | End: 2018-11-12 | Stop reason: SDUPTHER

## 2018-07-13 ENCOUNTER — TRANSCRIBE ORDERS (OUTPATIENT)
Dept: ADMINISTRATIVE | Facility: HOSPITAL | Age: 63
End: 2018-07-13

## 2018-07-13 DIAGNOSIS — M67.449 DIGITAL MUCINOUS CYST OF FINGER: Primary | ICD-10-CM

## 2018-10-25 ENCOUNTER — FLU SHOT (OUTPATIENT)
Dept: INTERNAL MEDICINE | Age: 63
End: 2018-10-25

## 2018-10-25 DIAGNOSIS — Z23 FLU VACCINE NEED: ICD-10-CM

## 2018-10-25 PROCEDURE — 90674 CCIIV4 VAC NO PRSV 0.5 ML IM: CPT | Performed by: INTERNAL MEDICINE

## 2018-10-25 PROCEDURE — 90471 IMMUNIZATION ADMIN: CPT | Performed by: INTERNAL MEDICINE

## 2018-11-12 ENCOUNTER — TELEPHONE (OUTPATIENT)
Dept: INTERNAL MEDICINE | Age: 63
End: 2018-11-12

## 2018-11-12 DIAGNOSIS — E03.9 HYPOTHYROIDISM, UNSPECIFIED TYPE: Chronic | ICD-10-CM

## 2018-11-12 RX ORDER — LEVOTHYROXINE SODIUM 112 UG/1
TABLET ORAL
Qty: 90 TABLET | Refills: 3 | Status: SHIPPED | OUTPATIENT
Start: 2018-11-12 | End: 2020-01-29 | Stop reason: SDUPTHER

## 2018-11-12 NOTE — TELEPHONE ENCOUNTER
Bandar contacted [facility] to get her Synthroid refilled. They told her she needed to contact our office.

## 2018-11-13 RX ORDER — LEVOTHYROXINE SODIUM 112 MCG
TABLET ORAL
Qty: 90 TABLET | Refills: 3 | Status: SHIPPED | OUTPATIENT
Start: 2018-11-13 | End: 2019-01-28

## 2018-11-16 ENCOUNTER — APPOINTMENT (OUTPATIENT)
Dept: WOMENS IMAGING | Facility: HOSPITAL | Age: 63
End: 2018-11-16

## 2018-11-16 PROCEDURE — 77063 BREAST TOMOSYNTHESIS BI: CPT | Performed by: RADIOLOGY

## 2018-11-16 PROCEDURE — 77067 SCR MAMMO BI INCL CAD: CPT | Performed by: RADIOLOGY

## 2019-01-18 DIAGNOSIS — Z00.00 PREVENTATIVE HEALTH CARE: Primary | ICD-10-CM

## 2019-01-22 LAB
ALBUMIN SERPL-MCNC: 4.1 G/DL (ref 3.5–5.2)
ALBUMIN/GLOB SERPL: 2 G/DL
ALP SERPL-CCNC: 46 U/L (ref 39–117)
ALT SERPL-CCNC: 18 U/L (ref 1–33)
APPEARANCE UR: CLEAR
AST SERPL-CCNC: 18 U/L (ref 1–32)
BACTERIA #/AREA URNS HPF: ABNORMAL /HPF
BASOPHILS # BLD AUTO: 0.06 10*3/MM3 (ref 0–0.2)
BASOPHILS NFR BLD AUTO: 0.8 % (ref 0–1.5)
BILIRUB SERPL-MCNC: 0.5 MG/DL (ref 0.1–1.2)
BILIRUB UR QL STRIP: NEGATIVE
BUN SERPL-MCNC: 21 MG/DL (ref 8–23)
BUN/CREAT SERPL: 30 (ref 7–25)
CALCIUM SERPL-MCNC: 9 MG/DL (ref 8.6–10.5)
CASTS URNS MICRO: ABNORMAL
CHLORIDE SERPL-SCNC: 106 MMOL/L (ref 98–107)
CHOLEST SERPL-MCNC: 199 MG/DL (ref 0–200)
CHOLEST/HDLC SERPL: 3.16 {RATIO}
CO2 SERPL-SCNC: 24.2 MMOL/L (ref 22–29)
COLOR UR: YELLOW
CREAT SERPL-MCNC: 0.7 MG/DL (ref 0.57–1)
EOSINOPHIL # BLD AUTO: 0.64 10*3/MM3 (ref 0–0.7)
EOSINOPHIL NFR BLD AUTO: 8.6 % (ref 0.3–6.2)
EPI CELLS #/AREA URNS HPF: ABNORMAL /HPF
ERYTHROCYTE [DISTWIDTH] IN BLOOD BY AUTOMATED COUNT: 13 % (ref 11.7–13)
GLOBULIN SER CALC-MCNC: 2.1 GM/DL
GLUCOSE SERPL-MCNC: 92 MG/DL (ref 65–99)
GLUCOSE UR QL: NEGATIVE
HBA1C MFR BLD: 5.46 % (ref 4.8–5.6)
HCT VFR BLD AUTO: 41.2 % (ref 35.6–45.5)
HCV AB S/CO SERPL IA: 0.1 S/CO RATIO (ref 0–0.9)
HDLC SERPL-MCNC: 63 MG/DL (ref 40–60)
HGB BLD-MCNC: 13.3 G/DL (ref 11.9–15.5)
HGB UR QL STRIP: NEGATIVE
IMM GRANULOCYTES # BLD AUTO: 0.02 10*3/MM3 (ref 0–0.03)
IMM GRANULOCYTES NFR BLD AUTO: 0.3 % (ref 0–0.5)
KETONES UR QL STRIP: NEGATIVE
LDLC SERPL CALC-MCNC: 106 MG/DL (ref 0–100)
LEUKOCYTE ESTERASE UR QL STRIP: ABNORMAL
LYMPHOCYTES # BLD AUTO: 2.74 10*3/MM3 (ref 0.9–4.8)
LYMPHOCYTES NFR BLD AUTO: 36.7 % (ref 19.6–45.3)
MCH RBC QN AUTO: 31.2 PG (ref 26.9–32)
MCHC RBC AUTO-ENTMCNC: 32.3 G/DL (ref 32.4–36.3)
MCV RBC AUTO: 96.7 FL (ref 80.5–98.2)
MONOCYTES # BLD AUTO: 0.62 10*3/MM3 (ref 0.2–1.2)
MONOCYTES NFR BLD AUTO: 8.3 % (ref 5–12)
NEUTROPHILS # BLD AUTO: 3.41 10*3/MM3 (ref 1.9–8.1)
NEUTROPHILS NFR BLD AUTO: 45.6 % (ref 42.7–76)
NITRITE UR QL STRIP: NEGATIVE
PH UR STRIP: 5.5 [PH] (ref 5–8)
PLATELET # BLD AUTO: 233 10*3/MM3 (ref 140–500)
POTASSIUM SERPL-SCNC: 4.1 MMOL/L (ref 3.5–5.2)
PROT SERPL-MCNC: 6.2 G/DL (ref 6–8.5)
PROT UR QL STRIP: NEGATIVE
RBC # BLD AUTO: 4.26 10*6/MM3 (ref 3.9–5.2)
RBC #/AREA URNS HPF: ABNORMAL /HPF
SODIUM SERPL-SCNC: 143 MMOL/L (ref 136–145)
SP GR UR: 1.02 (ref 1–1.03)
T4 FREE SERPL-MCNC: 1.15 NG/DL (ref 0.93–1.7)
TRIGL SERPL-MCNC: 150 MG/DL (ref 0–150)
TSH SERPL DL<=0.005 MIU/L-ACNC: 0.8 MIU/ML (ref 0.27–4.2)
UROBILINOGEN UR STRIP-MCNC: ABNORMAL MG/DL
VLDLC SERPL CALC-MCNC: 30 MG/DL (ref 5–40)
WBC # BLD AUTO: 7.47 10*3/MM3 (ref 4.5–10.7)
WBC #/AREA URNS HPF: ABNORMAL /HPF

## 2019-01-28 ENCOUNTER — OFFICE VISIT (OUTPATIENT)
Dept: INTERNAL MEDICINE | Age: 64
End: 2019-01-28

## 2019-01-28 VITALS
TEMPERATURE: 98.2 F | SYSTOLIC BLOOD PRESSURE: 104 MMHG | BODY MASS INDEX: 24.45 KG/M2 | WEIGHT: 138 LBS | DIASTOLIC BLOOD PRESSURE: 64 MMHG | HEIGHT: 63 IN | OXYGEN SATURATION: 97 % | HEART RATE: 80 BPM

## 2019-01-28 DIAGNOSIS — Z85.820 HISTORY OF MELANOMA: ICD-10-CM

## 2019-01-28 DIAGNOSIS — R07.9 RIGHT-SIDED CHEST PAIN: ICD-10-CM

## 2019-01-28 DIAGNOSIS — Z00.00 ENCOUNTER FOR ANNUAL HEALTH EXAMINATION: Primary | ICD-10-CM

## 2019-01-28 DIAGNOSIS — R10.9 RIGHT SIDED ABDOMINAL PAIN: ICD-10-CM

## 2019-01-28 DIAGNOSIS — Z80.0 FAMILY HISTORY OF COLON CANCER: ICD-10-CM

## 2019-01-28 PROCEDURE — 99213 OFFICE O/P EST LOW 20 MIN: CPT | Performed by: INTERNAL MEDICINE

## 2019-01-28 PROCEDURE — 99396 PREV VISIT EST AGE 40-64: CPT | Performed by: INTERNAL MEDICINE

## 2019-01-28 PROCEDURE — 90632 HEPA VACCINE ADULT IM: CPT | Performed by: INTERNAL MEDICINE

## 2019-01-28 PROCEDURE — 90472 IMMUNIZATION ADMIN EACH ADD: CPT | Performed by: INTERNAL MEDICINE

## 2019-01-28 PROCEDURE — 90471 IMMUNIZATION ADMIN: CPT | Performed by: INTERNAL MEDICINE

## 2019-01-28 PROCEDURE — 90714 TD VACC NO PRESV 7 YRS+ IM: CPT | Performed by: INTERNAL MEDICINE

## 2019-01-28 RX ORDER — CIPROFLOXACIN 250 MG/1
250 TABLET, FILM COATED ORAL 2 TIMES DAILY
Qty: 6 TABLET | Refills: 0 | Status: SHIPPED | OUTPATIENT
Start: 2019-01-28 | End: 2019-01-31

## 2019-01-28 NOTE — PROGRESS NOTES
[facility] [place] [facility]  SCOTT JHAVERI M.D.      Gwendolyn Mavats / 63 y.o. / female  01/28/2019    CC:  Annual Exam (last unknown)      HPI:      Gwendolyn presents for annual health maintenance visit.  History of melanoma diagnosed 10 years ago (of the anterior chest region).   Complains of ongoing right lateral chest and right side abdominal pain/discomfort. Denies change in bowels or shortness of breath. Denies exertional chest pain. To some degree pain is positionally related.     · Last health maintenance visit: unsure  · General health: good  · Lifestyle:  · Attempting to lose weight?: No   · Diet: adequate/fair  · Exercise: adequate/fair  · Tobacco: Never used   · Alcohol: regular (moderate)  · Work: Retired  · Reproductive health:  · Sexually active?: Yes   · Sexual problems?: No problems  · Concern for STD?: No    · Sees Gynecologist?: Yes   · Juliann/Postmenopausal?: Yes   · Domestic abuse concerns: No   · Depression Screening:      PHQ-2/PHQ-9 Depression Screening 1/28/2019   Little interest or pleasure in doing things 0   Feeling down, depressed, or hopeless 0   Total Score 0         PHQ-2: 0 (Not depressed)   PHQ-9:     Patient Care Team:  Darrian Jhaveri MD as PCP - General (Internal Medicine)  Mariam Machado MD as Consulting Physician (Dermatology)  Niranjan Ramos MD as Consulting Physician (Obstetrics and Gynecology)  ______________________________________________________________________    ALLERGIES  Allergies   Allergen Reactions   • Penicillins Hives        MEDICATIONS  Current Outpatient Medications on File Prior to Visit   Medication Sig   • levothyroxine (SYNTHROID) 112 MCG tablet TAKE 1 TABLET DAILY. PLEASE TAKE ON EMPTY STOMACH IN THE MORNING, DO NOT EAT OR DRINK FOR 1 HOUR   • LORazepam (ATIVAN) 0.5 MG tablet Take 1 tablet by mouth Daily As Needed for Anxiety.       PFSH:     The following portions of the patient's history were reviewed and updated as appropriate: Allergies / Current Medications /  Past Medical History / Surgical History / Social History / Family History    PROBLEM LIST   Patient Active Problem List   Diagnosis   • Hypothyroidism   • Other specified anxiety disorders   • Functional diarrhea       PAST MEDICAL HISTORY  Past Medical History:   Diagnosis Date   • Anxiety    • Arthritis    • History of shingles 06/2017    on right breast   • Hypothyroidism    • Melanoma (CMS/HCC) 2011    upper chest, followed by Dr. Mariam Eldridge    • Postpartum hemorrhage    • Sigmoid diverticulosis    • Surgical complication     excessive bleeding during surgical procedures       SURGICAL HISTORY  Past Surgical History:   Procedure Laterality Date   • ANAL SPHINCTEROPLASTY N/A 1984   • CHOLECYSTECTOMY WITH INTRAOPERATIVE CHOLANGIOGRAM N/A 6/20/2017    Procedure: LAPAROSCOPIC CHOLECYSTECTOMY WITH INTRAOPERATIVE CHOLANGIOGRAM;  Surgeon: Jessica Cannon MD;  Location: [facility] MAIN OR;  Service:    • COLONOSCOPY N/A 2010    normal colonoscopy, done at [facility]   • OK ERCP DX COLLECTION SPECIMEN BRUSHING/WASHING N/A 6/21/2017    Procedure: ENDOSCOPIC RETROGRADE CHOLANGIOPANCREATOGRAPHY with sphinterotomy and ballon duct sweep;  Surgeon: Pastor Stanton MD;  Location: [facility] ENDOSCOPY;  Service: Gastroenterology   • SKIN CANCER EXCISION N/A 2011    Melanoma of the upper chest excision   • TOTAL LAPAROSCOPIC HYSTERECTOMY SALPINGO OOPHORECTOMY Bilateral 1999    Dr. 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)   Tobacco Use   • Smoking status: Never Smoker   • Smokeless tobacco: Never Used   Substance and Sexual Activity   • Alcohol use: Yes     Frequency: 2-3 times a week      "Drinks per session: 3 or 4     Comment: Social   • Drug use: No   • Sexual activity: Yes     Partners: Male       FAMILY HISTORY  Family History   Problem Relation Age of Onset   • Arthritis Mother    • Hypertension Mother    • Osteoporosis Mother    • Bleeding Disorder Mother    • Depression Mother    • Coronary artery disease Mother    • Atrial fibrillation Mother    • Polymyalgia rheumatica Mother    • Colon cancer Mother    • Prostate cancer Father    • Liver cancer Father    • Diabetes Father    • Glaucoma Father    • Colon polyps Father    • Thyroid disease Sister    • Heart disease Paternal Grandfather        IMMUNIZATION HISTORY  Immunization History   Administered Date(s) Administered   • Flu Mist 01/19/2013, 10/10/2013, 10/24/2014   • Flu Vaccine Quad PF 6-35MO 10/25/2017   • Influenza TIV (IM) 10/28/2016   • Tdap 05/30/2008   • flucelvax quad pfs =>4 YRS 10/25/2018       ______________________________________________________________________    REVIEW OF SYSTEMS    Review of Systems   Constitutional: Negative.  Negative for unexpected weight change.   HENT: Positive for hearing loss.    Eyes: Negative.         Cataracts    Respiratory: Negative.  Negative for shortness of breath. Chest tightness: right side chest pain.    Cardiovascular: Negative.    Gastrointestinal: Positive for abdominal pain (right abd/chest).   Endocrine: Negative.    Genitourinary: Positive for flank pain. Negative for hematuria, pelvic pain and vaginal bleeding.   Musculoskeletal: Positive for arthralgias (right hip).   Skin: Negative.    Allergic/Immunologic: Negative.    Neurological: Negative.    Hematological: Negative.    Psychiatric/Behavioral: Negative.          VITALS:    Visit Vitals  /64   Pulse 80   Temp 98.2 °F (36.8 °C)   Ht 160 cm (62.99\")   Wt 62.6 kg (138 lb)   SpO2 97%   BMI 24.45 kg/m²       BP Readings from Last 3 Encounters:   01/28/19 104/64   03/08/18 114/72   12/21/17 112/68     Wt Readings from Last 3 " Encounters:   01/28/19 62.6 kg (138 lb)   03/08/18 60.8 kg (134 lb)   12/21/17 60.1 kg (132 lb 6.4 oz)      Body mass index is 24.45 kg/m².    PHYSICAL EXAMINATION    Physical Exam   Constitutional: She is oriented to person, place, and time. She appears well-developed and well-nourished. No distress.   HENT:   Head: Normocephalic and atraumatic.   Right Ear: External ear normal. Decreased hearing is noted.   Left Ear: External ear normal. Decreased hearing is noted.   Nose: Nose normal.   Mouth/Throat: Oropharynx is clear and moist.   Eyes: Conjunctivae and EOM are normal. Pupils are equal, round, and reactive to light. No scleral icterus.   Neck: Normal range of motion. Neck supple. No tracheal deviation present. No thyroid mass and no thyromegaly present.   Cardiovascular: Normal rate, regular rhythm, normal heart sounds and intact distal pulses.   Pulmonary/Chest: Effort normal and breath sounds normal.   Abdominal: Soft. Bowel sounds are normal. She exhibits no distension and no mass. There is no hepatosplenomegaly. There is no tenderness. No hernia.       Genitourinary:   Genitourinary Comments: Deferred to gyne/by patient unless specified otherwise.    Musculoskeletal: She exhibits no edema or deformity.   Neurological: She is alert and oriented to person, place, and time. She has normal reflexes. No cranial nerve deficit. She exhibits normal muscle tone. Coordination normal.   Skin: Skin is warm. No rash noted. No pallor.   Psychiatric: She has a normal mood and affect. Her behavior is normal. Judgment and thought content normal.         REVIEWED DATA    Labs:    Lab Results   Component Value Date     01/21/2019    K 4.1 01/21/2019    AST 18 01/21/2019    ALT 18 01/21/2019    BUN 21 01/21/2019    CREATININE 0.70 01/21/2019    CREATININE 0.78 11/20/2017    CREATININE 0.69 06/21/2017    EGFRIFNONA 85 01/21/2019    EGFRIFAFRI 102 01/21/2019       Lab Results   Component Value Date    GLUCOSE 118 (H)  06/21/2017    HGBA1C 5.46 01/21/2019    TSH 0.796 01/21/2019    FREET4 1.15 01/21/2019       Lab Results   Component Value Date     (H) 01/21/2019    HDL 63 (H) 01/21/2019    TRIG 150 01/21/2019    CHOLHDLRATIO 3.16 01/21/2019       Lab Results   Component Value Date    NILO45YW 31.7 01/24/2018        Lab Results   Component Value Date    WBC 7.47 01/21/2019    HGB 13.3 01/21/2019    MCV 96.7 01/21/2019     01/21/2019       Lab Results   Component Value Date    PROTEIN Negative 01/21/2019    GLUCOSEU Negative 01/21/2019    BLOODU Negative 01/21/2019    NITRITEU Negative 01/21/2019    LEUKOCYTESUR Trace (A) 01/21/2019          Lab Results   Component Value Date    HEPCVIRUSABY 0.1 01/21/2019       Imaging:  PA AND LATERAL CHEST X-RAY     HISTORY: Persistent cough. Recent flu.     TECHNIQUE: PA and lateral images of the chest are correlated with chest  x-ray 07/19/2012.     FINDINGS: The cardiomediastinal silhouette is normal. The lungs are  clear and the costophrenic sulci are dry.     IMPRESSION:  Negative.     This report was finalized on 12/21/2017     CT ABDOMEN AND PELVIS WITH CONTRAST-     CLINICAL INFORMATION:  Right-sided abdominal pain, history of melanoma.     COMPARISON: 07/09/2009     FINDINGS: Tiny gallbladder polyp is reidentified, the gallbladder is  otherwise within normal limits. No biliary duct dilatation. The liver,  pancreas, spleen, adrenal glands and kidneys are within normal limits.  Caliber of the aorta is normal. There is bladder wall thickening  diffusely. Vaginal cuff is typical post hysterectomy. Nominal  diverticulosis of the colon, there is no indication of diverticulitis.  The appendix and small bowel have a satisfactory appearance. The lung  bases are within normal limits. No lytic or sclerotic bone lesion.     CONCLUSION:  1. Tiny gallbladder polyp is reidentified.   2. Nominal sigmoid diverticulosis.  3. No acute intra-abdominal abnormality or indication of  metastatic  disease.     This report was finalized on 6/10/2017     Medical Tests:      ______________________________________________________________________    ASSESSMENT & PLAN    ANNUAL WELLNESS EXAM / PHYSICAL     Other medical problems addressed today:  Problem List Items Addressed This Visit     None      Visit Diagnoses     Encounter for annual health examination    -  Primary    Relevant Orders    Hepatitis A Vaccine Adult IM    Ambulatory Referral For Screening Colonoscopy    Td Vaccine Greater Than or Equal To 8yo Preservative Free IM    Right sided abdominal pain        Relevant Orders    CT abdomen w contrast    Right-sided chest pain        Relevant Orders    CT Chest With Contrast    Family history of colon cancer        Relevant Orders    Ambulatory Referral For Screening Colonoscopy    History of melanoma        Relevant Orders    CT abdomen w contrast    CT Chest With Contrast          Summary/Discussion:     Primary reason for today's visit was for annual health examination. However above active medical issues also needed to be addressed today.        Return in about 6 months (around 7/28/2019) for Hypothyroidism.    No future appointments.    HEALTHCARE MAINTENANCE ISSUES:    Cancer Screening:  · Colon: Initial/Next screening at age: OVERDUE  · Repeat colonoscopy every 5 years  · Breast: Recommended monthly self exams; annual professional exam  · Mammogram: every 1 year or every 2 years  · Cervical: N/A s/p total hysterectomy  · Skin: Monthly self skin examination, annual exam by health professional (sees dermatologist for melanoma history)  · Lung:   · Other:    Screening Labs & Tests:  · Lab results reviewed & discussed with the patient or test orders placed today.  · EKG:  · Vascular Screening:   · DEXA (65+ or postmenopausal with risk factors):   · HEP C (If born [id number], or risk factors): Negative screen    Immunization/Vaccinations (to be given today unless deferred by  patient)  · Influenza: Patient had the flu shot this season  · Hepatitis A: Administer today and 2nd shot in 6 months  · Tetanus/Pertussis: Administer today  · Pneumovax: Not needed at this time  · Prevnar 13: Not needed at this time  · Shingles: Recommended Shingrix at pharmacy  · Other:     Lifestyle Counseling:  · Lifestyle Modifications: Continue good lifestyle choices/modifications  · Safety Issues: Always wear seatbelt, Avoid texting while driving   · Use sunscreen, regular skin examination  · Recommended annual dental/vision examination.  · Emotional/Stress/Sleep: Reviewed and  given when appropriate      Health Maintenance   Topic Date Due   • ZOSTER VACCINE (1 of 2) 10/30/2005   • COLONOSCOPY  05/26/2016   • TDAP/TD VACCINES (2 - Td) 05/30/2018   • ANNUAL PHYSICAL  01/29/2020   • MAMMOGRAM  11/16/2020   • HEPATITIS C SCREENING  Completed   • INFLUENZA VACCINE  Completed   • PAP SMEAR  Discontinued         **Dragon Disclaimer:   Much of this encounter note is an electronic transcription/translation of spoken language to printed text. The electronic translation of spoken language may permit erroneous, or at times, nonsensical words or phrases to be inadvertently transcribed. Although I have reviewed the note for such errors, some may still exist.

## 2019-02-05 ENCOUNTER — APPOINTMENT (OUTPATIENT)
Dept: CT IMAGING | Facility: HOSPITAL | Age: 64
End: 2019-02-05

## 2019-02-11 ENCOUNTER — TELEPHONE (OUTPATIENT)
Dept: INTERNAL MEDICINE | Age: 64
End: 2019-02-11

## 2019-02-11 NOTE — TELEPHONE ENCOUNTER
Ct abdomen was denied by insurance. Called and spoke to patient's . They are aware Ct is being cancelled and will wait to hear from our office with what to do next.

## 2019-02-18 ENCOUNTER — TELEPHONE (OUTPATIENT)
Dept: INTERNAL MEDICINE | Age: 64
End: 2019-02-18

## 2019-02-19 NOTE — TELEPHONE ENCOUNTER
Patient would like to know what to do next since CT was denied by insurance. I sent you a message about this before the weekend.

## 2019-02-19 NOTE — TELEPHONE ENCOUNTER
Spoke to patient's  and let him know I sent another message to Dr. Hallman asking what he wants to do since CT scan was denied by insurance.

## 2019-02-21 NOTE — TELEPHONE ENCOUNTER
S/W pt and gave her Dr. Hallman's recommendation. Patient said she would have to call back to see what she could work in for the next week.

## 2019-02-25 ENCOUNTER — PREP FOR SURGERY (OUTPATIENT)
Dept: OTHER | Facility: HOSPITAL | Age: 64
End: 2019-02-25

## 2019-02-25 DIAGNOSIS — Z12.11 SCREENING FOR COLON CANCER: Primary | ICD-10-CM

## 2019-02-25 DIAGNOSIS — Z80.0 FAMILY HISTORY OF COLON CANCER IN MOTHER: ICD-10-CM

## 2019-03-06 PROBLEM — Z12.11 SCREENING FOR COLON CANCER: Status: ACTIVE | Noted: 2019-03-06

## 2019-03-06 PROBLEM — Z80.0 FAMILY HISTORY OF COLON CANCER IN MOTHER: Status: ACTIVE | Noted: 2019-03-06

## 2019-03-28 ENCOUNTER — ANESTHESIA EVENT (OUTPATIENT)
Dept: GASTROENTEROLOGY | Facility: HOSPITAL | Age: 64
End: 2019-03-28

## 2019-03-28 ENCOUNTER — ANESTHESIA (OUTPATIENT)
Dept: GASTROENTEROLOGY | Facility: HOSPITAL | Age: 64
End: 2019-03-28

## 2019-03-28 ENCOUNTER — HOSPITAL ENCOUNTER (OUTPATIENT)
Facility: HOSPITAL | Age: 64
Setting detail: HOSPITAL OUTPATIENT SURGERY
Discharge: HOME | End: 2019-03-28
Attending: SURGERY | Admitting: SURGERY

## 2019-03-28 VITALS
HEIGHT: 63 IN | RESPIRATION RATE: 16 BRPM | HEART RATE: 72 BPM | BODY MASS INDEX: 23.67 KG/M2 | WEIGHT: 133.6 LBS | OXYGEN SATURATION: 100 % | TEMPERATURE: 97.7 F | SYSTOLIC BLOOD PRESSURE: 84 MMHG | DIASTOLIC BLOOD PRESSURE: 51 MMHG

## 2019-03-28 PROCEDURE — S0260 H&P FOR SURGERY: HCPCS | Performed by: SURGERY

## 2019-03-28 PROCEDURE — 45378 DIAGNOSTIC COLONOSCOPY: CPT | Performed by: SURGERY

## 2019-03-28 PROCEDURE — 25010000002 PROPOFOL 10 MG/ML EMULSION: Performed by: ANESTHESIOLOGY

## 2019-03-28 RX ORDER — PROMETHAZINE HYDROCHLORIDE 25 MG/1
25 TABLET ORAL ONCE AS NEEDED
Status: DISCONTINUED | OUTPATIENT
Start: 2019-03-28 | End: 2019-03-28 | Stop reason: HOSPADM

## 2019-03-28 RX ORDER — PROMETHAZINE HYDROCHLORIDE 25 MG/ML
12.5 INJECTION, SOLUTION INTRAMUSCULAR; INTRAVENOUS ONCE AS NEEDED
Status: DISCONTINUED | OUTPATIENT
Start: 2019-03-28 | End: 2019-03-28 | Stop reason: HOSPADM

## 2019-03-28 RX ORDER — LIDOCAINE HYDROCHLORIDE 10 MG/ML
0.5 INJECTION, SOLUTION INFILTRATION; PERINEURAL ONCE AS NEEDED
Status: DISCONTINUED | OUTPATIENT
Start: 2019-03-28 | End: 2019-03-28 | Stop reason: HOSPADM

## 2019-03-28 RX ORDER — SODIUM CHLORIDE 0.9 % (FLUSH) 0.9 %
3 SYRINGE (ML) INJECTION AS NEEDED
Status: DISCONTINUED | OUTPATIENT
Start: 2019-03-28 | End: 2019-03-28 | Stop reason: HOSPADM

## 2019-03-28 RX ORDER — SODIUM CHLORIDE, SODIUM LACTATE, POTASSIUM CHLORIDE, CALCIUM CHLORIDE 600; 310; 30; 20 MG/100ML; MG/100ML; MG/100ML; MG/100ML
1000 INJECTION, SOLUTION INTRAVENOUS CONTINUOUS
Status: DISCONTINUED | OUTPATIENT
Start: 2019-03-28 | End: 2019-03-28 | Stop reason: HOSPADM

## 2019-03-28 RX ORDER — PROMETHAZINE HYDROCHLORIDE 25 MG/1
25 SUPPOSITORY RECTAL ONCE AS NEEDED
Status: DISCONTINUED | OUTPATIENT
Start: 2019-03-28 | End: 2019-03-28 | Stop reason: HOSPADM

## 2019-03-28 RX ORDER — PROPOFOL 10 MG/ML
VIAL (ML) INTRAVENOUS CONTINUOUS PRN
Status: DISCONTINUED | OUTPATIENT
Start: 2019-03-28 | End: 2019-03-28 | Stop reason: SURG

## 2019-03-28 RX ADMIN — PROPOFOL 200 MCG/KG/MIN: 10 INJECTION, EMULSION INTRAVENOUS at 08:10

## 2019-03-28 RX ADMIN — SODIUM CHLORIDE, POTASSIUM CHLORIDE, SODIUM LACTATE AND CALCIUM CHLORIDE: 600; 310; 30; 20 INJECTION, SOLUTION INTRAVENOUS at 08:08

## 2019-03-28 RX ADMIN — SODIUM CHLORIDE, POTASSIUM CHLORIDE, SODIUM LACTATE AND CALCIUM CHLORIDE 1000 ML: 600; 310; 30; 20 INJECTION, SOLUTION INTRAVENOUS at 07:27

## 2019-03-28 NOTE — ANESTHESIA PREPROCEDURE EVALUATION
Anesthesia Evaluation     no history of anesthetic complications:  NPO Solid Status: > 8 hours             Airway   Mallampati: I  TM distance: >3 FB  Neck ROM: full  Dental - normal exam     Pulmonary - normal exam   Cardiovascular - normal exam    (-) hypertension, past MI      Neuro/Psych  GI/Hepatic/Renal/Endo    (+)   hypothyroidism,     Musculoskeletal     Abdominal    Substance History      OB/GYN          Other                        Anesthesia Plan    ASA 2     MAC     Anesthetic plan, all risks, benefits, and alternatives have been provided, discussed and informed consent has been obtained with: patient.

## 2019-03-28 NOTE — ANESTHESIA POSTPROCEDURE EVALUATION
Patient: Gwendolyn Huddleston    Procedure Summary     Date:  03/28/19 Room / Location:  [facility] ENDOSCOPY 6 /  [facility]    Anesthesia Start:  0808 Anesthesia Stop:  0837    Procedure:  COLONOSCOPY TO CECUM (N/A ) Diagnosis:       Screening for colon cancer      Family history of colon cancer in mother      (Screening for colon cancer [Z12.11])      (Family history of colon cancer in mother [Z80.0])    Surgeon:  Jessica Cannon MD Provider:  William Lopez MD    Anesthesia Type:  MAC ASA Status:  2          Anesthesia Type: MAC  Last vitals  BP   96/66 (03/28/19 0843)   Temp   36.5 °C (97.7 °F) (03/28/19 0714)   Pulse   65 (03/28/19 0843)   Resp   16 (03/28/19 0843)     SpO2   97 % (03/28/19 0843)     Post Anesthesia Care and Evaluation    Patient location during evaluation: bedside  Pain management: adequate  Airway patency: patent  Anesthetic complications: No anesthetic complications    Cardiovascular status: acceptable  Respiratory status: acceptable  Hydration status: acceptable

## 2019-07-25 ENCOUNTER — TELEPHONE (OUTPATIENT)
Dept: INTERNAL MEDICINE | Age: 64
End: 2019-07-25

## 2019-07-25 NOTE — TELEPHONE ENCOUNTER
Pt called stating him and his wife have an appt Monday the 29th, and his wife had her hep a shot done sometime in January, wanted to know if she could add that second hep shot into her appt.  Please advise.

## 2019-07-29 ENCOUNTER — OFFICE VISIT (OUTPATIENT)
Dept: INTERNAL MEDICINE | Age: 64
End: 2019-07-29

## 2019-07-29 VITALS
WEIGHT: 138 LBS | SYSTOLIC BLOOD PRESSURE: 112 MMHG | RESPIRATION RATE: 18 BRPM | DIASTOLIC BLOOD PRESSURE: 72 MMHG | BODY MASS INDEX: 24.45 KG/M2 | OXYGEN SATURATION: 97 % | HEART RATE: 79 BPM | HEIGHT: 63 IN | TEMPERATURE: 98.4 F

## 2019-07-29 DIAGNOSIS — E03.9 HYPOTHYROIDISM, UNSPECIFIED TYPE: Chronic | ICD-10-CM

## 2019-07-29 DIAGNOSIS — Z23 NEED FOR VACCINATION: Primary | ICD-10-CM

## 2019-07-29 DIAGNOSIS — F41.8 OTHER SPECIFIED ANXIETY DISORDERS: Chronic | ICD-10-CM

## 2019-07-29 LAB
T4 FREE SERPL-MCNC: 1.32 NG/DL (ref 0.93–1.7)
TSH SERPL DL<=0.005 MIU/L-ACNC: 0.25 MIU/ML (ref 0.27–4.2)

## 2019-07-29 PROCEDURE — 90471 IMMUNIZATION ADMIN: CPT | Performed by: INTERNAL MEDICINE

## 2019-07-29 PROCEDURE — 99213 OFFICE O/P EST LOW 20 MIN: CPT | Performed by: INTERNAL MEDICINE

## 2019-07-29 PROCEDURE — 90632 HEPA VACCINE ADULT IM: CPT | Performed by: INTERNAL MEDICINE

## 2019-07-29 RX ORDER — LORAZEPAM 0.5 MG/1
0.5 TABLET ORAL DAILY PRN
Qty: 30 TABLET | Refills: 0 | Status: SHIPPED | OUTPATIENT
Start: 2019-07-29 | End: 2020-01-29 | Stop reason: SDUPTHER

## 2019-07-29 NOTE — ASSESSMENT & PLAN NOTE
Very occasional use of alprazolam for anxiety attacks.   Refilled #[id number], no refills. Review Leonardo.

## 2019-07-29 NOTE — PROGRESS NOTES
"[facility] [place] INTERNAL MEDICINE  SCOTT JHAVERI M.D.      Gwendolyn Lemusdialexander Mavats / 63 y.o. / female  07/29/2019      CHIEF COMPLAINT     Hypothyroidism (6 MONTH FOLLOW UP )      ASSESSMENT & PLAN     Problem List Items Addressed This Visit        High    Hypothyroidism (Chronic)    Overview     *Name brand Synthroid only         Relevant Medications    levothyroxine (SYNTHROID) 112 MCG tablet    Other Relevant Orders    TSH+Free T4       Low    Other specified anxiety disorders (Chronic)    Overview     Has been on lorazepam taken as needed for episodic anxiety         Current Assessment & Plan     Very occasional use of alprazolam for anxiety attacks.   Refilled #30, no refills. Review Leonardo.          Relevant Medications    LORazepam (ATIVAN) 0.5 MG tablet      Other Visit Diagnoses     Need for vaccination    -  Primary    Relevant Orders    Hepatitis A Vaccine Adult IM (Completed)        Orders Placed This Encounter   Procedures   • Hepatitis A Vaccine Adult IM   • TSH+Free T4     New Medications Ordered This Visit   Medications   • LORazepam (ATIVAN) 0.5 MG tablet     Sig: Take 1 tablet by mouth Daily As Needed for Anxiety.     Dispense:  30 tablet     Refill:  0       Summary/Discussion:  ·       Next Appointment with me: Visit date not found    Return in about 6 months (around 1/29/2020) for Hypothyroidism.      MEDICATIONS     Current Outpatient Medications   Medication Sig Dispense Refill   • levothyroxine (SYNTHROID) 112 MCG tablet TAKE 1 TABLET DAILY. PLEASE TAKE ON EMPTY STOMACH IN THE MORNING, DO NOT EAT OR DRINK FOR 1 HOUR 90 tablet 3   • LORazepam (ATIVAN) 0.5 MG tablet Take 1 tablet by mouth Daily As Needed for Anxiety. 30 tablet 0     No current facility-administered medications for this visit.           VITAL SIGNS     Visit Vitals  /72 (BP Location: Left arm, Patient Position: Sitting, Cuff Size: Adult)   Pulse 79   Temp 98.4 °F (36.9 °C) (Temporal)   Resp 18   Ht 160 cm (62.99\")   Wt 62.6 kg (138 lb) "   LMP  (LMP Unknown)   SpO2 97%   Breastfeeding? No   BMI 24.45 kg/m²       BP Readings from Last 3 Encounters:   07/29/19 112/72   03/28/19 (!) 84/51   01/28/19 104/64     Wt Readings from Last 3 Encounters:   07/29/19 62.6 kg (138 lb)   03/28/19 60.6 kg (133 lb 9.6 oz)   01/28/19 62.6 kg (138 lb)      Body mass index is 24.45 kg/m².      HISTORY OF PRESENT ILLNESS     Chronic hypothyroidism:  On stable dose of levothyroxine, no changes in physical symptoms  Lab Results   Component Value Date    TSH 0.796 01/21/2019    TSH 0.467 03/08/2018    FREET4 1.15 01/21/2019    FREET4 1.44 03/08/2018     Situational anxiety: uses alprazolam 0.5 mg very sparingly as needed.       Patient Care Team:  Darrian Hallman MD as PCP - General (Internal Medicine)  Mariam Machaod MD as Consulting Physician (Dermatology)  Niranjan Ramos MD as Consulting Physician (Obstetrics and Gynecology)      REVIEW OF SYSTEMS     Review of Systems  Constitutional neg  Resp neg  CV neg  Psych neg depression       PHYSICAL EXAMINATION     Physical Exam  Constitutional  No distress  Neck: No mass, thyroid nodule, thyromegaly or cervical LAD   Cardiovascular Rate  normal . Rhythm: regular . Heart sounds:  Normal  Psychiatric  Alert. Judgment and thought content normal. Mood normal      REVIEWED DATA     Labs:     Lab Results   Component Value Date     01/21/2019    K 4.1 01/21/2019    CALCIUM 9.0 01/21/2019    AST 18 01/21/2019    ALT 18 01/21/2019    BUN 21 01/21/2019    CREATININE 0.70 01/21/2019    CREATININE 0.78 11/20/2017    CREATININE 0.69 06/21/2017    EGFRIFNONA 85 01/21/2019    EGFRIFAFRI 102 01/21/2019       Lab Results   Component Value Date    HGBA1C 5.46 01/21/2019    GLU 92 01/21/2019    GLU 89 11/20/2017       Lab Results   Component Value Date     (H) 01/21/2019    HDL 63 (H) 01/21/2019    TRIG 150 01/21/2019    CHOLHDLRATIO 3.16 01/21/2019       Lab Results   Component Value Date    TSH 0.796 01/21/2019    FREET4 1.15  "01/21/2019       Lab Results   Component Value Date    WBC 7.47 01/21/2019    HGB 13.3 01/21/2019    HGB 12.6 12/21/2017    HGB 12.4 06/21/2017     01/21/2019       Lab Results   Component Value Date    PROTEIN Negative 01/21/2019    GLUCOSEU Negative 01/21/2019    BLOODU Negative 01/21/2019    NITRITEU Negative 02/19/2019    LEUKOCYTESUR Trace (A) 01/21/2019       Imaging:         Medical Tests:         Summary of old records / correspondence / consultant report:         Request outside records:         ALLERGIES  Allergies   Allergen Reactions   • Penicillins Hives        PFSH:     The following portions of the patient's history were reviewed and updated as appropriate: Allergies / Current Medications / Past Medical History / Surgical History / Social History / Family History    PROBLEM LIST   Patient Active Problem List   Diagnosis   • Hypothyroidism   • Other specified anxiety disorders   • Functional diarrhea   • Screening for colon cancer   • Family history of colon cancer in mother       PAST MEDICAL HISTORY  Past Medical History:   Diagnosis Date   • Anxiety    • Arthritis    • Colon abnormality     STATED SHE HAD A \"KINK\" IN COLON, UNABLE TO PROCEED WITH LAST COLONOSCOPY..  DID AN EGD    • Dysphagia    • History of shingles 06/2017    on right breast   • Hypothyroidism    • Melanoma (CMS/HCC) 2011    upper chest, followed by Dr. Mariam Eldridge    • Postpartum hemorrhage    • Sigmoid diverticulosis    • Surgical complication     excessive bleeding during surgical procedures       SURGICAL HISTORY  Past Surgical History:   Procedure Laterality Date   • ANAL SPHINCTEROPLASTY N/A 1984   • CHOLECYSTECTOMY WITH INTRAOPERATIVE CHOLANGIOGRAM N/A 6/20/2017    Procedure: LAPAROSCOPIC CHOLECYSTECTOMY WITH INTRAOPERATIVE CHOLANGIOGRAM;  Surgeon: Jessica Cannon MD;  Location: [facility];  Service:    • COLONOSCOPY N/A 2010    normal colonoscopy, done at [facility]   • COLONOSCOPY N/A 3/28/2019    Procedure: " COLONOSCOPY TO CECUM;  Surgeon: Jessica Cannon MD;  Location: [facility] ENDOSCOPY;  Service: General   • ENDOSCOPY     • FOOT CLOSED REDUCTION Left    • MD ERCP DX COLLECTION SPECIMEN BRUSHING/WASHING N/A 6/21/2017    Procedure: ENDOSCOPIC RETROGRADE CHOLANGIOPANCREATOGRAPHY with sphinterotomy and ballon duct sweep;  Surgeon: Pastor Stanton MD;  Location: [facility] ENDOSCOPY;  Service: Gastroenterology   • SKIN CANCER EXCISION N/A 2011    Melanoma of the upper chest excision   • TOTAL LAPAROSCOPIC HYSTERECTOMY SALPINGO OOPHORECTOMY Bilateral 1999    Dr. 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)   Tobacco Use   • Smoking status: Never Smoker   • Smokeless tobacco: Never Used   Substance and Sexual Activity   • Alcohol use: Yes     Frequency: 2-3 times a week     Drinks per session: 3 or 4     Comment: Social   • Drug use: No   • Sexual activity: Yes     Partners: Male   Lifestyle   • Physical activity:     Days per week: 7 days     Minutes per session: 50 min   • Stress: Only a little       FAMILY HISTORY  Family History   Problem Relation Age of Onset   • Arthritis Mother    • Hypertension Mother    • Osteoporosis Mother    • Bleeding Disorder Mother    • Depression Mother    • Coronary artery disease Mother    • Atrial fibrillation Mother    • Polymyalgia rheumatica Mother    • Colon cancer Mother    • Prostate cancer Father    • Liver cancer Father    • Diabetes Father    • Glaucoma Father    • Colon polyps Father    • Thyroid disease Sister    • Heart disease Paternal Grandfather    • Malig Hyperthermia Neg Hx          **Dragon Disclaimer:   Much of this encounter  note is an electronic transcription/translation of spoken language to printed text. The electronic translation of spoken language may permit erroneous, or at times, nonsensical words or phrases to be inadvertently transcribed. Although I have reviewed the note for such errors, some may still exist.       Template created by Ingrid Hallman MD

## 2019-10-08 ENCOUNTER — FLU SHOT (OUTPATIENT)
Dept: INTERNAL MEDICINE | Age: 64
End: 2019-10-08

## 2019-10-08 DIAGNOSIS — Z23 FLU VACCINE NEED: ICD-10-CM

## 2019-10-09 PROCEDURE — 90471 IMMUNIZATION ADMIN: CPT | Performed by: INTERNAL MEDICINE

## 2019-10-09 PROCEDURE — 90674 CCIIV4 VAC NO PRSV 0.5 ML IM: CPT | Performed by: INTERNAL MEDICINE

## 2019-11-12 ENCOUNTER — OFFICE VISIT (OUTPATIENT)
Dept: INTERNAL MEDICINE | Age: 64
End: 2019-11-12

## 2019-11-12 VITALS
OXYGEN SATURATION: 95 % | HEIGHT: 63 IN | SYSTOLIC BLOOD PRESSURE: 118 MMHG | TEMPERATURE: 97.2 F | HEART RATE: 87 BPM | BODY MASS INDEX: 24.27 KG/M2 | WEIGHT: 137 LBS | DIASTOLIC BLOOD PRESSURE: 72 MMHG

## 2019-11-12 DIAGNOSIS — R13.19 ESOPHAGEAL DYSPHAGIA: ICD-10-CM

## 2019-11-12 DIAGNOSIS — R10.11 RUQ PAIN: Primary | ICD-10-CM

## 2019-11-12 DIAGNOSIS — R10.9 RIGHT SIDED ABDOMINAL PAIN: ICD-10-CM

## 2019-11-12 PROCEDURE — 99214 OFFICE O/P EST MOD 30 MIN: CPT | Performed by: NURSE PRACTITIONER

## 2019-11-12 NOTE — PROGRESS NOTES
"Gwendolyn Mavaalis / 64 y.o. / female  Encounter Date: 11/12/2019    ASSESSMENT & PLAN:    Problem List Items Addressed This Visit     None      Visit Diagnoses     RUQ pain    -  Primary    Relevant Orders    CT Chest With Contrast    CT Abdomen Pelvis With Contrast    Right sided abdominal pain        Relevant Orders    CT Chest With Contrast    CT Abdomen Pelvis With Contrast    Esophageal dysphagia        Relevant Orders    CT Chest With Contrast    CT Abdomen Pelvis With Contrast        Orders Placed This Encounter   Procedures   • CT Chest With Contrast   • CT Abdomen Pelvis With Contrast     No orders of the defined types were placed in this encounter.      Summary/Discussion:  Right sided abdominal pain  -Proceed with CT of chest and abdomen for further evaluation.  Treatment plan pending these results.  Advised patient to continue to monitor pattern of symptoms, weight loss, irritating foods or drinks, and positioning.      Return if symptoms worsen or fail to improve, for Next scheduled follow up.  ________________________________________________________________    VITALS:    Visit Vitals  /72   Pulse 87   Temp 97.2 °F (36.2 °C) (Temporal)   Ht 160 cm (62.99\")   Wt 62.1 kg (137 lb)   LMP  (LMP Unknown)   SpO2 95%   BMI 24.27 kg/m²       BP Readings from Last 3 Encounters:   11/12/19 118/72   07/29/19 112/72   03/28/19 (!) 84/51     Wt Readings from Last 3 Encounters:   11/12/19 62.1 kg (137 lb)   07/29/19 62.6 kg (138 lb)   03/28/19 60.6 kg (133 lb 9.6 oz)      Body mass index is 24.27 kg/m².    CC: Main reason(s) for today's visit: Rib Pain      HPI    Patient is a 64 y.o. female who is here for ongoing RUQ pain and discomfort, >1 year.  She reports that she has had this right-sided abdominal, upper quadrant, pain and discomfort for over a year now.  She describes it as a \"fullness and discomfort \"feeling that is up under the right rib cage, worse with bending forward, but relieved with laying " flat to stretch out or with direct palpated pressure to the area.  She has been tying her waistband of her rope tightly around the area to feel improvement.  She has mild relief with ibuprofen.  Most relieving is stretching out by lying flat.  She denies any worsening with food, drinks, alcohol, caffeine.  Denies reflux symptoms.  Of note over the last year, she also has discomfort with swallowing on occasion.  She has a feeling of food getting stuck in her esophagus for approximately 1 year now, causing her to choke on foods and even water on occasion.  Her mother also has an issue with esophageal stricture.  She is a personal history of cholecystectomy and melanoma (removed from chest approximately 10 years ago).  She has never been a smoker.  She denies fever, weight loss, nausea/vomiting/diarrhea/constipation, decreased appetite, weakness, numbness, pain radiating to back, urinary symptoms, shortness of air, dyspnea on exertion.    Patient Care Team:  Darrian Hallman MD as PCP - General (Internal Medicine)  Mariam Machado MD as Consulting Physician (Dermatology)  Niranjan Ramos MD as Consulting Physician (Obstetrics and Gynecology)  ____________________________________________________________________    REVIEW OF SYSTEMS    Review of Systems  As noted per HPI  Constitutional neg  Resp neg  CV neg     PHYSICAL EXAMINATION    Physical Exam   Constitutional: She appears well-developed and well-nourished. No distress.   Cardiovascular: Normal rate, regular rhythm and normal heart sounds.   Pulmonary/Chest: Effort normal and breath sounds normal. She has no wheezes.   Abdominal: Soft. Bowel sounds are normal. She exhibits no distension, no ascites and no mass. There is tenderness (RUQ with deep palpation) in the right upper quadrant. There is no rigidity, no rebound, no guarding, no tenderness at McBurney's point and negative Blair's sign. No hernia.       Vitals reviewed.    REVIEWED DATA:    Labs:   Lab Results    Component Value Date     01/21/2019    K 4.1 01/21/2019    AST 18 01/21/2019    ALT 18 01/21/2019    BUN 21 01/21/2019    CREATININE 0.70 01/21/2019    CREATININE 0.78 11/20/2017    CREATININE 0.69 06/21/2017    EGFRIFNONA 85 01/21/2019    EGFRIFAFRI 102 01/21/2019       Lab Results   Component Value Date    GLUCOSE 118 (H) 06/21/2017    GLUCOSE 85 06/16/2017    HGBA1C 5.46 01/21/2019       Lab Results   Component Value Date     (H) 01/21/2019    HDL 63 (H) 01/21/2019    TRIG 150 01/21/2019    CHOLHDLRATIO 3.16 01/21/2019       Lab Results   Component Value Date    TSH 0.252 (L) 07/29/2019    FREET4 1.32 07/29/2019          Lab Results   Component Value Date    WBC 7.47 01/21/2019    HGB 13.3 01/21/2019    HGB 12.6 12/21/2017    HGB 12.4 06/21/2017     01/21/2019         Imaging:      Medical Tests:      Summary of old records / correspondence / consultant report:      Request outside records:   ______________________________________________________________________    ALLERGIES  Allergies   Allergen Reactions   • Penicillins Hives        MEDICATIONS  Current Outpatient Medications on File Prior to Visit   Medication Sig   • levothyroxine (SYNTHROID) 112 MCG tablet TAKE 1 TABLET DAILY. PLEASE TAKE ON EMPTY STOMACH IN THE MORNING, DO NOT EAT OR DRINK FOR 1 HOUR   • LORazepam (ATIVAN) 0.5 MG tablet Take 1 tablet by mouth Daily As Needed for Anxiety.     No current facility-administered medications on file prior to visit.        PFSH:     The following portions of the patient's history were reviewed and updated as appropriate: Allergies / Current Medications / Past Medical History / Surgical History / Social History / Family History    PROBLEM LIST   Patient Active Problem List   Diagnosis   • Hypothyroidism   • Other specified anxiety disorders   • Functional diarrhea   • Screening for colon cancer   • Family history of colon cancer in mother       PAST MEDICAL HISTORY  Past Medical History:  "  Diagnosis Date   • Anxiety    • Arthritis    • Colon abnormality     STATED SHE HAD A \"KINK\" IN COLON, UNABLE TO PROCEED WITH LAST COLONOSCOPY..  DID AN EGD    • Dysphagia    • History of shingles 06/2017    on right breast   • Hypothyroidism    • Melanoma (CMS/HCC) 2011    upper chest, followed by Dr. Mariam Eldridge    • Postpartum hemorrhage    • Sigmoid diverticulosis    • Surgical complication     excessive bleeding during surgical procedures       SURGICAL HISTORY  Past Surgical History:   Procedure Laterality Date   • ANAL SPHINCTEROPLASTY N/A 1984   • CHOLECYSTECTOMY WITH INTRAOPERATIVE CHOLANGIOGRAM N/A 6/20/2017    Procedure: LAPAROSCOPIC CHOLECYSTECTOMY WITH INTRAOPERATIVE CHOLANGIOGRAM;  Surgeon: Jessica Cannon MD;  Location: [facility] MAIN OR;  Service:    • COLONOSCOPY N/A 2010    normal colonoscopy, done at [facility]   • COLONOSCOPY N/A 3/28/2019    Procedure: COLONOSCOPY TO CECUM;  Surgeon: Jessica Cannon MD;  Location: [facility] ENDOSCOPY;  Service: General   • ENDOSCOPY     • FOOT CLOSED REDUCTION Left    • LA ERCP DX COLLECTION SPECIMEN BRUSHING/WASHING N/A 6/21/2017    Procedure: ENDOSCOPIC RETROGRADE CHOLANGIOPANCREATOGRAPHY with sphinterotomy and ballon duct sweep;  Surgeon: Pastor Stanton MD;  Location: [facility] ENDOSCOPY;  Service: Gastroenterology   • SKIN CANCER EXCISION N/A 2011    Melanoma of the upper chest excision   • TOTAL LAPAROSCOPIC HYSTERECTOMY SALPINGO OOPHORECTOMY Bilateral 1999    Dr. 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)   Tobacco Use   • Smoking status: Never Smoker   • Smokeless tobacco: Never Used   Substance and Sexual " Activity   • Alcohol use: Yes     Frequency: 2-3 times a week     Drinks per session: 3 or 4     Comment: Social   • Drug use: No   • Sexual activity: Yes     Partners: Male   Lifestyle   • Physical activity:     Days per week: 7 days     Minutes per session: 50 min   • Stress: Only a little       FAMILY HISTORY  Family History   Problem Relation Age of Onset   • Arthritis Mother    • Hypertension Mother    • Osteoporosis Mother    • Bleeding Disorder Mother    • Depression Mother    • Coronary artery disease Mother    • Atrial fibrillation Mother    • Polymyalgia rheumatica Mother    • Colon cancer Mother    • Prostate cancer Father    • Liver cancer Father    • Diabetes Father    • Glaucoma Father    • Colon polyps Father    • Thyroid disease Sister    • Heart disease Paternal Grandfather    • Malig Hyperthermia Neg Hx

## 2019-11-18 ENCOUNTER — APPOINTMENT (OUTPATIENT)
Dept: WOMENS IMAGING | Facility: HOSPITAL | Age: 64
End: 2019-11-18

## 2019-11-18 PROCEDURE — 77067 SCR MAMMO BI INCL CAD: CPT | Performed by: RADIOLOGY

## 2019-11-18 PROCEDURE — 77063 BREAST TOMOSYNTHESIS BI: CPT | Performed by: RADIOLOGY

## 2019-11-19 DIAGNOSIS — E03.9 HYPOTHYROIDISM, UNSPECIFIED TYPE: Chronic | ICD-10-CM

## 2019-11-20 RX ORDER — LEVOTHYROXINE SODIUM 112 MCG
TABLET ORAL
Qty: 90 TABLET | Refills: 3 | Status: SHIPPED | OUTPATIENT
Start: 2019-11-20 | End: 2020-09-02 | Stop reason: SDUPTHER

## 2019-11-22 ENCOUNTER — HOSPITAL ENCOUNTER (OUTPATIENT)
Dept: CT IMAGING | Facility: HOSPITAL | Age: 64
Discharge: HOME | End: 2019-11-22
Admitting: NURSE PRACTITIONER

## 2019-11-22 DIAGNOSIS — R10.9 RIGHT SIDED ABDOMINAL PAIN: ICD-10-CM

## 2019-11-22 DIAGNOSIS — R10.11 RUQ PAIN: ICD-10-CM

## 2019-11-22 DIAGNOSIS — R13.19 ESOPHAGEAL DYSPHAGIA: ICD-10-CM

## 2019-11-22 LAB — CREAT BLDA-MCNC: 0.6 MG/DL (ref 0.6–1.3)

## 2019-11-22 PROCEDURE — 74177 CT ABD & PELVIS W/CONTRAST: CPT

## 2019-11-22 PROCEDURE — 82565 ASSAY OF CREATININE: CPT

## 2019-11-22 PROCEDURE — 71260 CT THORAX DX C+: CPT

## 2019-11-22 PROCEDURE — 25010000002 IOPAMIDOL 61 % SOLUTION: Performed by: NURSE PRACTITIONER

## 2019-11-22 RX ADMIN — IOPAMIDOL 85 ML: 612 INJECTION, SOLUTION INTRAVENOUS at 10:42

## 2020-01-17 NOTE — BRIEF OP NOTE
ENDOSCOPIC RETROGRADE CHOLANGIOPANCREATOGRAPHY  Procedure Note    Gwendolyn Huddleston  6/20/2017 - 6/21/2017    Pre-op Diagnosis:   Calculus of bile duct with cholecystitis without obstruction, unspecified cholecystitis acuity [K80.40]    Post-op Diagnosis:     Post-Op Diagnosis Codes:     * Calculus of bile duct with cholecystitis without obstruction, unspecified cholecystitis acuity [K80.40]    Procedure/CPT® Codes:      Procedure(s):  ENDOSCOPIC RETROGRADE CHOLANGIOPANCREATOGRAPHY with sphinterotomy and ballon duct sweep    Surgeon(s):  Pastor Stanton MD    Anesthesia: Monitor Anesthesia Care    Staff:   Endo Technician: Mary Kay Stahl  Endo Nurse: Juanita Oquendo RN    Estimated Blood Loss: *No blood loss documented*  Urine Voided: * No values recorded between 6/21/2017 11:30 AM and 6/21/2017 11:46 AM *    Specimens:                * No specimens in log *      Drains:    na       Findings: 3 mm common duct stone removed    Complications: None      Pastor Stanton MD     Date: 6/21/2017  Time: 11:56 AM       negative...

## 2020-01-29 ENCOUNTER — OFFICE VISIT (OUTPATIENT)
Dept: INTERNAL MEDICINE | Age: 65
End: 2020-01-29

## 2020-01-29 VITALS
BODY MASS INDEX: 24.27 KG/M2 | HEART RATE: 76 BPM | TEMPERATURE: 97.3 F | DIASTOLIC BLOOD PRESSURE: 72 MMHG | SYSTOLIC BLOOD PRESSURE: 102 MMHG | HEIGHT: 63 IN | WEIGHT: 137 LBS | OXYGEN SATURATION: 98 %

## 2020-01-29 DIAGNOSIS — Z51.81 THERAPEUTIC DRUG MONITORING: ICD-10-CM

## 2020-01-29 DIAGNOSIS — Z85.820 HISTORY OF MELANOMA: ICD-10-CM

## 2020-01-29 DIAGNOSIS — F41.8 OTHER SPECIFIED ANXIETY DISORDERS: Chronic | ICD-10-CM

## 2020-01-29 DIAGNOSIS — R07.81 RIB PAIN ON RIGHT SIDE: Primary | ICD-10-CM

## 2020-01-29 DIAGNOSIS — E03.9 HYPOTHYROIDISM, UNSPECIFIED TYPE: Chronic | ICD-10-CM

## 2020-01-29 DIAGNOSIS — Z20.828 EXPOSURE TO THE FLU: ICD-10-CM

## 2020-01-29 PROCEDURE — 99214 OFFICE O/P EST MOD 30 MIN: CPT | Performed by: INTERNAL MEDICINE

## 2020-01-29 RX ORDER — OSELTAMIVIR PHOSPHATE 75 MG/1
75 CAPSULE ORAL DAILY
Qty: 10 CAPSULE | Refills: 0 | Status: SHIPPED | OUTPATIENT
Start: 2020-01-29 | End: 2020-02-08

## 2020-01-29 RX ORDER — CIPROFLOXACIN 250 MG/1
500 TABLET, FILM COATED ORAL 2 TIMES DAILY
Qty: 12 TABLET | Refills: 0 | Status: SHIPPED | OUTPATIENT
Start: 2020-01-29 | End: 2020-02-04

## 2020-01-29 RX ORDER — LORAZEPAM 0.5 MG/1
0.5 TABLET ORAL DAILY PRN
Qty: 30 TABLET | Refills: 2 | Status: SHIPPED | OUTPATIENT
Start: 2020-01-29 | End: 2020-09-03

## 2020-01-29 NOTE — ASSESSMENT & PLAN NOTE
Reviewed latest TERESA . Reviewed most recent UDS or ordered UDS. Patient is having medication refilled at expected intervals. Using medication appropriately without evidence of unusual increased use, abuse, or diverting.

## 2020-01-29 NOTE — PROGRESS NOTES
[facility]  SCOTT JHAVERI M.D.      Gwendolyn Lemusdiff Kovats / 64 y.o. / female  01/29/2020      CHIEF COMPLAINT     Hypothyroidism (due UDS, contract) and persistent right rib discomfort      ASSESSMENT & PLAN     Problem List Items Addressed This Visit        High    Hypothyroidism (Chronic)    Overview     *Name brand Synthroid only         Relevant Medications    SYNTHROID 112 MCG tablet    Other Relevant Orders    Comprehensive Metabolic Panel    TSH+Free T4    Rib pain on right side - Primary (Chronic)    Current Assessment & Plan     Reviewed recent CT chest/abdomen (negative for cause of right rib/chest discomfort.  Check xray of right side ribs.         Relevant Orders    XR Ribs Right With PA Chest       Low    Other specified anxiety disorders (Chronic)    Overview     Has been on lorazepam taken as needed for episodic anxiety         Current Assessment & Plan     Reviewed latest TERESA . Reviewed most recent UDS or ordered UDS. Patient is having medication refilled at expected intervals. Using medication appropriately without evidence of unusual increased use, abuse, or diverting.          Relevant Medications    LORazepam (ATIVAN) 0.5 MG tablet      Other Visit Diagnoses     Exposure to the flu        Relevant Medications    oseltamivir (TAMIFLU) 75 MG capsule    History of melanoma        Relevant Orders    XR Ribs Right With PA Chest    Therapeutic drug monitoring        Relevant Orders    Compliance Drug Analysis, Ur - Urine, Clean Catch        Orders Placed This Encounter   Procedures   • XR Ribs Right With PA Chest   • Comprehensive Metabolic Panel   • TSH+Free T4   • Compliance Drug Analysis, Ur - Urine, Clean Catch     New Medications Ordered This Visit   Medications   • oseltamivir (TAMIFLU) 75 MG capsule     Sig: Take 1 capsule by mouth Daily for 10 days.     Dispense:  10 capsule     Refill:  0   • ciprofloxacin (CIPRO) 250 MG tablet     Sig: Take 2 tablets by mouth 2 (Two) Times a Day  "for 6 days.     Dispense:  12 tablet     Refill:  0       Summary/Discussion:  ·       Next Appointment with me: Visit date not found    Return in about 6 months (around 7/29/2020) for Hypothyroidism.      MEDICATIONS     Current Outpatient Medications   Medication Sig Dispense Refill   • LORazepam (ATIVAN) 0.5 MG tablet Take 1 tablet by mouth Daily As Needed for Anxiety. 30 tablet 0   • SYNTHROID 112 MCG tablet TAKE 1 TABLET DAILY ON     EMPTY STOMACH IN THE       MORNING. DO NOT EAT OR     DRINK FOR 1 HOUR. 90 tablet 3       VITAL SIGNS     Visit Vitals  /72   Pulse 76   Temp 97.3 °F (36.3 °C)   Ht 160 cm (62.99\")   Wt 62.1 kg (137 lb)   LMP  (LMP Unknown)   SpO2 98%   BMI 24.28 kg/m²       BP Readings from Last 3 Encounters:   01/29/20 102/72   11/12/19 118/72   07/29/19 112/72     Wt Readings from Last 3 Encounters:   01/29/20 62.1 kg (137 lb)   11/12/19 62.1 kg (137 lb)   07/29/19 62.6 kg (138 lb)      Body mass index is 24.28 kg/m².      HISTORY OF PRESENT ILLNESS     Chronic hypothyroidism:  On stable dose of levothyroxine, no changes in physical symptoms  Lab Results   Component Value Date    TSH 0.252 (L) 07/29/2019    TSH 0.796 01/21/2019    FREET4 1.32 07/29/2019    FREET4 1.15 01/21/2019     Anxiety disorder: takes lorazepam PRN.     Complains on persistent right side rib discomfort, seems worst when sitting after a long day.   Prior CT chest/abd was negative as to cause of the symptoms.       Patient Care Team:  Darrian Hallman MD as PCP - General (Internal Medicine)  Mariam Machado MD as Consulting Physician (Dermatology)  Niranjan Ramos MD as Consulting Physician (Obstetrics and Gynecology)      REVIEW OF SYSTEMS     Review of Systems  Constitutional neg  Resp neg  CV neg  GI neg    PHYSICAL EXAMINATION     Physical Exam  Constitutional  No distress  Cardiovascular Rate  normal . Rhythm: regular . Heart sounds:  Normal  Pulmonary/Chest: Effort normal and breath sounds normal.    Right lower " rib/chest wall tenderness to palpation   Abdomen: Soft and nontender, no palpable mass, no hepatomegaly.   Psychiatric  Alert. Judgment intact. Thought content normal. Mood normal    REVIEWED DATA     Labs:     Lab Results   Component Value Date     01/21/2019    K 4.1 01/21/2019    CALCIUM 9.0 01/21/2019    AST 18 01/21/2019    ALT 18 01/21/2019    BUN 21 01/21/2019    CREATININE 0.60 11/22/2019    CREATININE 0.70 01/21/2019    CREATININE 0.78 11/20/2017    EGFRIFNONA 85 01/21/2019    EGFRIFAFRI 102 01/21/2019       Lab Results   Component Value Date    HGBA1C 5.46 01/21/2019    GLU 92 01/21/2019    GLU 89 11/20/2017       Lab Results   Component Value Date     (H) 01/21/2019    HDL 63 (H) 01/21/2019    TRIG 150 01/21/2019    CHOLHDLRATIO 3.16 01/21/2019       Lab Results   Component Value Date    TSH 0.252 (L) 07/29/2019    FREET4 1.32 07/29/2019       Lab Results   Component Value Date    WBC 7.47 01/21/2019    HGB 13.3 01/21/2019    HGB 12.6 12/21/2017    HGB 12.4 06/21/2017     01/21/2019       Lab Results   Component Value Date    PROTEIN Negative 01/21/2019    GLUCOSEU Negative 01/21/2019    BLOODU Negative 01/21/2019    NITRITEU Negative 01/21/2019    LEUKOCYTESUR Trace (A) 01/21/2019       Imaging:     CT CHEST, ABDOMEN, AND PELVIS WITH IV CONTRAST     HISTORY: 64-year-old female with right-sided chest pain and abdominal  pain. Esophageal dysphagia. Melanoma history. Cholecystectomy,  hysterectomy, and bilateral salpingo-oophorectomy in the past.     TECHNIQUE: Radiation dose reduction techniques were utilized, including  automated exposure control and exposure modulation based on body size.   3 mm images were obtained through the chest, abdomen, and pelvis after  the administration of IV contrast. Compared with previous CT abdomen and  pelvis from 06/08/2017 and chest CTA from 09/01/2016.     FINDINGS:  CHEST: There are no pulmonary airspace consolidations and there are no  suspicious  "nodules. There are no pleural or pericardial effusions. There  are stable shotty mediastinal nodes which are likely reactive. No  subcutaneous nodules or chest wall abnormality is seen.     ABDOMEN/PELVIS: The liver appears unremarkable and there is no biliary  dilatation. The spleen, pancreas, adrenals, and kidneys appear  unremarkable. Some of the gastric folds appear slightly thickened. No  acute bowel abnormality is seen. There is no significant colonic  diverticulosis. The appendix appears within normal limits. There is no  free fluid or lymphadenopathy. There has been interval development of a  small infraumbilical ventral hernia containing fat. The neck of the  hernia measures 2 cm and there is a small volume of herniated fat.     IMPRESSION:  1. There has been interval development of a small infraumbilical ventral  hernia containing fat.  2. Some of the gastric folds appear slightly thickened. Please correlate  clinically for gastritis.  3. There is no acute abnormality within the chest.     This report was finalized on 11/26/2019     Medical Tests:         Summary of old records / correspondence / consultant report:         Request outside records:         ALLERGIES  Allergies   Allergen Reactions   • Penicillins Hives        PFSH:     The following portions of the patient's history were reviewed and updated as appropriate: Allergies / Current Medications / Past Medical History / Surgical History / Social History / Family History    PROBLEM LIST   Patient Active Problem List   Diagnosis   • Hypothyroidism   • Other specified anxiety disorders   • Functional diarrhea   • Rib pain on right side       PAST MEDICAL HISTORY  Past Medical History:   Diagnosis Date   • Anxiety    • Arthritis    • Colon abnormality     STATED SHE HAD A \"KINK\" IN COLON, UNABLE TO PROCEED WITH LAST COLONOSCOPY..  DID AN EGD    • Dysphagia    • History of shingles 06/2017    on right breast   • Hypothyroidism    • Melanoma (CMS/HCC) " 2011    upper chest, followed by Dr. Mariam Eldridge    • Postpartum hemorrhage    • Sigmoid diverticulosis    • Surgical complication     excessive bleeding during surgical procedures       SURGICAL HISTORY  Past Surgical History:   Procedure Laterality Date   • ANAL SPHINCTEROPLASTY N/A 1984   • CHOLECYSTECTOMY WITH INTRAOPERATIVE CHOLANGIOGRAM N/A 6/20/2017    Procedure: LAPAROSCOPIC CHOLECYSTECTOMY WITH INTRAOPERATIVE CHOLANGIOGRAM;  Surgeon: Jessica Cannon MD;  Location: [facility] MAIN OR;  Service:    • COLONOSCOPY N/A 2010    normal colonoscopy, done at [facility]   • COLONOSCOPY N/A 3/28/2019    Procedure: COLONOSCOPY TO CECUM;  Surgeon: Jessica Cannon MD;  Location: [facility] ENDOSCOPY;  Service: General   • ENDOSCOPY     • FOOT CLOSED REDUCTION Left    • DE ERCP DX COLLECTION SPECIMEN BRUSHING/WASHING N/A 6/21/2017    Procedure: ENDOSCOPIC RETROGRADE CHOLANGIOPANCREATOGRAPHY with sphinterotomy and ballon duct sweep;  Surgeon: Pastor Stanton MD;  Location: [facility] ENDOSCOPY;  Service: Gastroenterology   • SKIN CANCER EXCISION N/A 2011    Melanoma of the upper chest excision   • TOTAL LAPAROSCOPIC HYSTERECTOMY SALPINGO OOPHORECTOMY Bilateral 1999    Dr. 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)   Tobacco Use   • Smoking status: Never Smoker   • Smokeless tobacco: Never Used   Substance and Sexual Activity   • Alcohol use: Yes     Frequency: 2-3 times a week     Drinks per session: 3 or 4     Comment: Social   • Drug use: No   • Sexual activity: Yes     Partners: Male   Lifestyle   • Physical activity:     Days per week: 7 days     Minutes per session: 50 min   •  Stress: Only a little       FAMILY HISTORY  Family History   Problem Relation Age of Onset   • Arthritis Mother    • Hypertension Mother    • Osteoporosis Mother    • Bleeding Disorder Mother    • Depression Mother    • Coronary artery disease Mother    • Atrial fibrillation Mother    • Polymyalgia rheumatica Mother    • Colon cancer Mother    • Prostate cancer Father    • Liver cancer Father    • Diabetes Father    • Glaucoma Father    • Colon polyps Father    • Thyroid disease Sister    • Heart disease Paternal Grandfather    • Malig Hyperthermia Neg Hx          **Dragon Disclaimer:   Much of this encounter note is an electronic transcription/translation of spoken language to printed text. The electronic translation of spoken language may permit erroneous, or at times, nonsensical words or phrases to be inadvertently transcribed. Although I have reviewed the note for such errors, some may still exist.       Template created by Ingrid Hallman MD

## 2020-01-29 NOTE — TELEPHONE ENCOUNTER
Pt states she only has 1 ativan 0.5 MG tablet left so she is requesting refill on that prescription as well. Please advise.

## 2020-01-29 NOTE — PATIENT INSTRUCTIONS
** IMPORTANT MESSAGE FROM DR. JHAVERI **    In our office, your satisfaction is VERY important to us.     You may receive a survey from [facility] by mail or E-mail for you to provide feedback about your visit. This information is invaluable for me to know what we can do to improve our services.     I ask that you please take a few minutes to complete the survey and let us know how we are doing in serving your needs. (You may receive the survey more than once for multiple visits)    Thank You !    Dr. Jhaveri & Staff    _________________________________________________________________________________________________________________________      ** ADDITIONAL INSTRUCTION / REMINDERS FROM DR. JHAVERI **

## 2020-01-29 NOTE — ASSESSMENT & PLAN NOTE
Reviewed recent CT chest/abdomen (negative for cause of right rib/chest discomfort.  Check xray of right side ribs.

## 2020-01-31 ENCOUNTER — HOSPITAL ENCOUNTER (OUTPATIENT)
Dept: GENERAL RADIOLOGY | Facility: HOSPITAL | Age: 65
Discharge: HOME | End: 2020-01-31
Admitting: INTERNAL MEDICINE

## 2020-01-31 PROCEDURE — 71101 X-RAY EXAM UNILAT RIBS/CHEST: CPT

## 2020-02-01 LAB
ALBUMIN SERPL-MCNC: 4.7 G/DL (ref 3.8–4.8)
ALBUMIN/GLOB SERPL: 2.2 {RATIO} (ref 1.2–2.2)
ALP SERPL-CCNC: 58 IU/L (ref 39–117)
ALT SERPL-CCNC: 21 IU/L (ref 0–32)
AST SERPL-CCNC: 19 IU/L (ref 0–40)
BILIRUB SERPL-MCNC: 0.5 MG/DL (ref 0–1.2)
BUN SERPL-MCNC: 22 MG/DL (ref 8–27)
BUN/CREAT SERPL: 30 (ref 12–28)
CALCIUM SERPL-MCNC: 9.3 MG/DL (ref 8.7–10.3)
CHLORIDE SERPL-SCNC: 103 MMOL/L (ref 96–106)
CO2 SERPL-SCNC: 23 MMOL/L (ref 20–29)
CREAT SERPL-MCNC: 0.73 MG/DL (ref 0.57–1)
DRUGS UR: NORMAL
GLOBULIN SER CALC-MCNC: 2.1 G/DL (ref 1.5–4.5)
GLUCOSE SERPL-MCNC: 91 MG/DL (ref 65–99)
POTASSIUM SERPL-SCNC: 4.4 MMOL/L (ref 3.5–5.2)
PROT SERPL-MCNC: 6.8 G/DL (ref 6–8.5)
SODIUM SERPL-SCNC: 142 MMOL/L (ref 134–144)
T4 FREE SERPL-MCNC: 1.56 NG/DL (ref 0.82–1.77)
TSH SERPL DL<=0.005 MIU/L-ACNC: 0.63 UIU/ML (ref 0.45–4.5)

## 2020-07-20 ENCOUNTER — TELEPHONE (OUTPATIENT)
Dept: INTERNAL MEDICINE | Age: 65
End: 2020-07-20

## 2020-07-20 NOTE — TELEPHONE ENCOUNTER
PATIENT WOULD LIKE TO BE TESTED FOR COVID. HER DAUGHTER HAS BEEN TO [place] AND SHE IS CONCERNED ABOUT HAVING IT. SHE HAS NO SYMPTOMS AND NO KNOWN EXPOSURE BUT IS CONCERNED.    PATIENT CALLBACK [phone]

## 2020-07-20 NOTE — TELEPHONE ENCOUNTER
Option for screening individuals without direct exposure or symptoms:    1. [facility] locations   2. [facility] for Drive-Through testing (have her go online for registration)

## 2020-08-12 NOTE — TELEPHONE ENCOUNTER
----- Message from Darrian Hallman MD sent at 1/25/2018  7:42 AM EST -----  Call patient with her test result(s) and mail the results to her if MyChart is NOT active.    Increase levothyroxine to 112 mcg (confirm on 100 mcg). Recheck TFT's in 2 months.  
Detail Level: Simple

## 2020-09-01 ENCOUNTER — OFFICE VISIT (OUTPATIENT)
Dept: INTERNAL MEDICINE | Age: 65
End: 2020-09-01

## 2020-09-01 VITALS
HEIGHT: 63 IN | WEIGHT: 138 LBS | BODY MASS INDEX: 24.45 KG/M2 | SYSTOLIC BLOOD PRESSURE: 124 MMHG | HEART RATE: 74 BPM | DIASTOLIC BLOOD PRESSURE: 78 MMHG | OXYGEN SATURATION: 98 % | TEMPERATURE: 97.3 F

## 2020-09-01 DIAGNOSIS — Z13.820 ENCOUNTER FOR SCREENING FOR OSTEOPOROSIS: ICD-10-CM

## 2020-09-01 DIAGNOSIS — Z23 ENCOUNTER FOR IMMUNIZATION: ICD-10-CM

## 2020-09-01 DIAGNOSIS — E55.9 VITAMIN D DEFICIENCY: ICD-10-CM

## 2020-09-01 DIAGNOSIS — F41.8 OTHER SPECIFIED ANXIETY DISORDERS: Chronic | ICD-10-CM

## 2020-09-01 DIAGNOSIS — Z78.0 POSTMENOPAUSAL STATE: ICD-10-CM

## 2020-09-01 DIAGNOSIS — E03.9 HYPOTHYROIDISM, UNSPECIFIED TYPE: Primary | Chronic | ICD-10-CM

## 2020-09-01 LAB
25(OH)D3+25(OH)D2 SERPL-MCNC: 38.5 NG/ML (ref 30–100)
ALBUMIN SERPL-MCNC: 4.4 G/DL (ref 3.5–5.2)
ALBUMIN/GLOB SERPL: 1.6 G/DL
ALP SERPL-CCNC: 54 U/L (ref 39–117)
ALT SERPL-CCNC: 21 U/L (ref 1–33)
APPEARANCE UR: CLEAR
AST SERPL-CCNC: 19 U/L (ref 1–32)
BASOPHILS # BLD AUTO: 0.07 10*3/MM3 (ref 0–0.2)
BASOPHILS NFR BLD AUTO: 1.1 % (ref 0–1.5)
BILIRUB SERPL-MCNC: 0.5 MG/DL (ref 0–1.2)
BILIRUB UR QL STRIP: NEGATIVE
BUN SERPL-MCNC: 17 MG/DL (ref 8–23)
BUN/CREAT SERPL: 25.4 (ref 7–25)
CALCIUM SERPL-MCNC: 9.6 MG/DL (ref 8.6–10.5)
CHLORIDE SERPL-SCNC: 104 MMOL/L (ref 98–107)
CHOLEST SERPL-MCNC: 216 MG/DL (ref 0–200)
CHOLEST/HDLC SERPL: 3.32 {RATIO}
CO2 SERPL-SCNC: 24.2 MMOL/L (ref 22–29)
COLOR UR: YELLOW
CREAT SERPL-MCNC: 0.67 MG/DL (ref 0.57–1)
EOSINOPHIL # BLD AUTO: 0.44 10*3/MM3 (ref 0–0.4)
EOSINOPHIL NFR BLD AUTO: 7 % (ref 0.3–6.2)
ERYTHROCYTE [DISTWIDTH] IN BLOOD BY AUTOMATED COUNT: 12.4 % (ref 12.3–15.4)
GLOBULIN SER CALC-MCNC: 2.7 GM/DL
GLUCOSE SERPL-MCNC: 84 MG/DL (ref 65–99)
GLUCOSE UR QL: NEGATIVE
HCT VFR BLD AUTO: 42.5 % (ref 34–46.6)
HDLC SERPL-MCNC: 65 MG/DL (ref 40–60)
HGB BLD-MCNC: 13.9 G/DL (ref 12–15.9)
HGB UR QL STRIP: NEGATIVE
IMM GRANULOCYTES # BLD AUTO: 0.01 10*3/MM3 (ref 0–0.05)
IMM GRANULOCYTES NFR BLD AUTO: 0.2 % (ref 0–0.5)
KETONES UR QL STRIP: NEGATIVE
LDLC SERPL CALC-MCNC: 129 MG/DL (ref 0–100)
LEUKOCYTE ESTERASE UR QL STRIP: NEGATIVE
LYMPHOCYTES # BLD AUTO: 1.96 10*3/MM3 (ref 0.7–3.1)
LYMPHOCYTES NFR BLD AUTO: 31.3 % (ref 19.6–45.3)
MCH RBC QN AUTO: 30.5 PG (ref 26.6–33)
MCHC RBC AUTO-ENTMCNC: 32.7 G/DL (ref 31.5–35.7)
MCV RBC AUTO: 93.4 FL (ref 79–97)
MONOCYTES # BLD AUTO: 0.5 10*3/MM3 (ref 0.1–0.9)
MONOCYTES NFR BLD AUTO: 8 % (ref 5–12)
NEUTROPHILS # BLD AUTO: 3.28 10*3/MM3 (ref 1.7–7)
NEUTROPHILS NFR BLD AUTO: 52.4 % (ref 42.7–76)
NITRITE UR QL STRIP: NEGATIVE
NRBC BLD AUTO-RTO: 0 /100 WBC (ref 0–0.2)
PH UR STRIP: 6 [PH] (ref 5–8)
PLATELET # BLD AUTO: 212 10*3/MM3 (ref 140–450)
POTASSIUM SERPL-SCNC: 4.2 MMOL/L (ref 3.5–5.2)
PROT SERPL-MCNC: 7.1 G/DL (ref 6–8.5)
PROT UR QL STRIP: NEGATIVE
RBC # BLD AUTO: 4.55 10*6/MM3 (ref 3.77–5.28)
SODIUM SERPL-SCNC: 141 MMOL/L (ref 136–145)
SP GR UR: 1.02 (ref 1–1.03)
T4 FREE SERPL-MCNC: 1.94 NG/DL (ref 0.93–1.7)
TRIGL SERPL-MCNC: 110 MG/DL (ref 0–150)
TSH SERPL DL<=0.005 MIU/L-ACNC: 0.14 UIU/ML (ref 0.27–4.2)
UROBILINOGEN UR STRIP-MCNC: NORMAL MG/DL
VLDLC SERPL CALC-MCNC: 22 MG/DL (ref 5–40)
WBC # BLD AUTO: 6.26 10*3/MM3 (ref 3.4–10.8)

## 2020-09-01 PROCEDURE — 90471 IMMUNIZATION ADMIN: CPT | Performed by: INTERNAL MEDICINE

## 2020-09-01 PROCEDURE — 90686 IIV4 VACC NO PRSV 0.5 ML IM: CPT | Performed by: INTERNAL MEDICINE

## 2020-09-01 PROCEDURE — 99214 OFFICE O/P EST MOD 30 MIN: CPT | Performed by: INTERNAL MEDICINE

## 2020-09-01 PROCEDURE — 90750 HZV VACC RECOMBINANT IM: CPT | Performed by: INTERNAL MEDICINE

## 2020-09-01 RX ORDER — ACETAMINOPHEN 500 MG
500 TABLET ORAL EVERY 8 HOURS PRN
COMMUNITY
Start: 2020-09-01

## 2020-09-01 RX ORDER — IBUPROFEN 200 MG
200 TABLET ORAL EVERY 6 HOURS PRN
COMMUNITY
End: 2020-09-01

## 2020-09-01 NOTE — PROGRESS NOTES
"[facility]  SCOTT JHAVERI M.D.      Gwendolyn Villegasff Kovats / 64 y.o. / female  09/01/2020      VITAL SIGNS     Visit Vitals  /78   Pulse 74   Temp 97.3 °F (36.3 °C)   Ht 160 cm (62.99\")   Wt 62.6 kg (138 lb)   LMP  (LMP Unknown)   SpO2 98%   BMI 24.45 kg/m²       BP Readings from Last 3 Encounters:   09/01/20 124/78   01/29/20 102/72   11/12/19 118/72     Wt Readings from Last 3 Encounters:   09/01/20 62.6 kg (138 lb)   01/29/20 62.1 kg (137 lb)   11/12/19 62.1 kg (137 lb)     Body mass index is 24.45 kg/m².      MEDICATIONS     Current Outpatient Medications   Medication Sig Dispense Refill   • LORazepam (ATIVAN) 0.5 MG tablet Take 1 tablet by mouth Daily As Needed for Anxiety. 30 tablet 2   • SYNTHROID 112 MCG tablet TAKE 1 TABLET DAILY ON     EMPTY STOMACH IN THE       MORNING. DO NOT EAT OR     DRINK FOR 1 HOUR. 90 tablet 3   • acetaminophen (TYLENOL) 500 MG tablet Take 1 tablet by mouth Every 8 (Eight) Hours As Needed for Mild Pain  or Moderate Pain .       No current facility-administered medications for this visit.        CHIEF COMPLAINT     Hypothyroidism      ASSESSMENT & PLAN     Problem List Items Addressed This Visit        High    Hypothyroidism - Primary (Chronic)    Overview     *Name brand Synthroid only         Relevant Medications    SYNTHROID 112 MCG tablet    Other Relevant Orders    TSH+Free T4    Comprehensive Metabolic Panel    Lipid Panel With / Chol / HDL Ratio    CBC & Differential    Urinalysis With Microscopic If Indicated (No Culture) - Urine, Clean Catch       Low    Other specified anxiety disorders (Chronic)    Overview     Has been on lorazepam taken as needed for episodic anxiety         Current Assessment & Plan     Continue lorazepam sparingly as needed.          Relevant Medications    LORazepam (ATIVAN) 0.5 MG tablet      Other Visit Diagnoses     Vitamin D deficiency        Relevant Orders    Vitamin D 25 Hydroxy    Encounter for screening for osteoporosis        " Relevant Orders    DEXA Bone Density Axial    Postmenopausal state        Relevant Orders    DEXA Bone Density Axial        Orders Placed This Encounter   Procedures   • DEXA Bone Density Axial   • Fluarix/Fluzone/Afluria Quad>6 Months   • TSH+Free T4   • Comprehensive Metabolic Panel   • Lipid Panel With / Chol / HDL Ratio   • Urinalysis With Microscopic If Indicated (No Culture) - Urine, Clean Catch   • Vitamin D 25 Hydroxy   • CBC & Differential     New Medications Ordered This Visit   Medications   • acetaminophen (TYLENOL) 500 MG tablet     Sig: Take 1 tablet by mouth Every 8 (Eight) Hours As Needed for Mild Pain  or Moderate Pain .       Summary/Discussion:  • Spent 25 minutes in face-to-face encounter time and >50% of time spent in counseling regarding above medical problems/issues.      Next Appointment with me: Visit date not found    Return in about 6 months (around 3/1/2021) for Reassess chronic medical problems.    _____________________________________________________________________________________    HISTORY OF PRESENT ILLNESS     Chronic hypothyroidism:  On stable dose of levothyroxine, no changes in physical symptoms  Lab Results   Component Value Date    TSH 0.627 01/29/2020    TSH 0.252 (L) 07/29/2019    FREET4 1.56 01/29/2020    FREET4 1.32 07/29/2019     Anxiety: takes lorazepam very sparingly as needed.       Patient Care Team:  Darrian Hallman MD as PCP - General (Internal Medicine)  Mariam Machado MD as Consulting Physician (Dermatology)  Niranjan Ramos MD as Consulting Physician (Obstetrics and Gynecology)      REVIEW OF SYSTEMS     Review of Systems  Constitutional neg except per HPI   Resp neg  CV neg   GI diarrhea  Psych stable anxiety without depression       PHYSICAL EXAMINATION     Physical Exam  Constitutional  No distress  Cardiovascular Rate  normal . Rhythm: regular . Heart sounds:  normal  Psychiatric  Alert. Judgment and thought content normal. Mood stable.     REVIEWED DATA  "    Labs:     Lab Results   Component Value Date     01/29/2020    K 4.4 01/29/2020    CALCIUM 9.3 01/29/2020    AST 19 01/29/2020    ALT 21 01/29/2020    BUN 22 01/29/2020    CREATININE 0.73 01/29/2020    CREATININE 0.60 11/22/2019    CREATININE 0.70 01/21/2019    EGFRIFNONA 87 01/29/2020    EGFRIFAFRI 101 01/29/2020       Lab Results   Component Value Date    HGBA1C 5.46 01/21/2019    GLU 91 01/29/2020    GLU 92 01/21/2019    GLU 89 11/20/2017     Lab Results   Component Value Date    DIVM65WX 31.7 01/24/2018      Lab Results   Component Value Date     (H) 01/21/2019    HDL 63 (H) 01/21/2019    TRIG 150 01/21/2019    CHOLHDLRATIO 3.16 01/21/2019       Lab Results   Component Value Date    TSH 0.627 01/29/2020    FREET4 1.56 01/29/2020       Lab Results   Component Value Date    WBC 7.47 01/21/2019    HGB 13.3 01/21/2019    HGB 12.6 12/21/2017    HGB 12.4 06/21/2017     01/21/2019       Lab Results   Component Value Date    PROTEIN Negative 01/21/2019    GLUCOSEU Negative 01/21/2019    BLOODU Negative 01/21/2019    NITRITEU Negative 01/21/2019    LEUKOCYTESUR Trace (A) 01/21/2019       Imaging:         Medical Tests:         Summary of old records / correspondence / consultant report:         Request outside records:         ALLERGIES  Allergies   Allergen Reactions   • Penicillins Hives        PFSH:     The following portions of the patient's history were reviewed and updated as appropriate: Allergies / Current Medications / Past Medical History / Surgical History / Social History / Family History    PROBLEM LIST   Patient Active Problem List   Diagnosis   • Hypothyroidism   • Other specified anxiety disorders   • Functional diarrhea   • Rib pain on right side       PAST MEDICAL HISTORY  Past Medical History:   Diagnosis Date   • Anxiety    • Arthritis    • Colon abnormality     STATED SHE HAD A \"KINK\" IN COLON, UNABLE TO PROCEED WITH LAST COLONOSCOPY..  DID AN EGD    • Dysphagia    • History " of shingles 06/2017    on right breast   • Hypothyroidism    • Melanoma (CMS/HCC) 2011    upper chest, followed by Dr. Mariam Eldridge    • Postpartum hemorrhage    • Sigmoid diverticulosis    • Surgical complication     excessive bleeding during surgical procedures       SURGICAL HISTORY  Past Surgical History:   Procedure Laterality Date   • ANAL SPHINCTEROPLASTY N/A 1984   • CHOLECYSTECTOMY WITH INTRAOPERATIVE CHOLANGIOGRAM N/A 6/20/2017    Procedure: LAPAROSCOPIC CHOLECYSTECTOMY WITH INTRAOPERATIVE CHOLANGIOGRAM;  Surgeon: Jessica Cannon MD;  Location: [facility] MAIN OR;  Service:    • COLONOSCOPY N/A 2010    normal colonoscopy, done at [facility]   • COLONOSCOPY N/A 3/28/2019    Procedure: COLONOSCOPY TO CECUM;  Surgeon: Jessica Cannon MD;  Location: [facility] ENDOSCOPY;  Service: General   • ENDOSCOPY     • FOOT CLOSED REDUCTION Left    • SC ERCP DX COLLECTION SPECIMEN BRUSHING/WASHING N/A 6/21/2017    Procedure: ENDOSCOPIC RETROGRADE CHOLANGIOPANCREATOGRAPHY with sphinterotomy and ballon duct sweep;  Surgeon: Pastor Stanton MD;  Location: [facility] ENDOSCOPY;  Service: Gastroenterology   • SKIN CANCER EXCISION N/A 2011    Melanoma of the upper chest excision   • TOTAL LAPAROSCOPIC HYSTERECTOMY SALPINGO OOPHORECTOMY Bilateral 1999    Dr. 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)   Tobacco Use   • Smoking status: Never Smoker   • Smokeless tobacco: Never Used   Substance and Sexual Activity   • Alcohol use: Yes     Frequency: 2-3 times a week     Drinks per session: 3 or 4     Comment: Social   • Drug use: No   • Sexual activity: Yes     Partners: Male   Lifestyle   •  Physical activity:     Days per week: 7 days     Minutes per session: 50 min   • Stress: Only a little       FAMILY HISTORY  Family History   Problem Relation Age of Onset   • Arthritis Mother    • Hypertension Mother    • Osteoporosis Mother    • Bleeding Disorder Mother    • Depression Mother    • Coronary artery disease Mother    • Atrial fibrillation Mother    • Polymyalgia rheumatica Mother    • Colon cancer Mother    • Prostate cancer Father    • Liver cancer Father    • Diabetes Father    • Glaucoma Father    • Colon polyps Father    • Thyroid disease Sister    • Heart disease Paternal Grandfather    • Malig Hyperthermia Neg Hx          **Dragon Disclaimer:   Much of this encounter note is an electronic transcription/translation of spoken language to printed text. The electronic translation of spoken language may permit erroneous, or at times, nonsensical words or phrases to be inadvertently transcribed. Although I have reviewed the note for such errors, some may still exist.     Template created by Ingrid Hallman MD

## 2020-09-02 DIAGNOSIS — E03.9 HYPOTHYROIDISM, UNSPECIFIED TYPE: Chronic | ICD-10-CM

## 2020-09-02 RX ORDER — LEVOTHYROXINE SODIUM 0.1 MG/1
100 TABLET ORAL EVERY MORNING
Qty: 90 TABLET | Refills: 0 | Status: SHIPPED | OUTPATIENT
Start: 2020-09-02 | End: 2020-09-08 | Stop reason: SDUPTHER

## 2020-09-02 NOTE — PROGRESS NOTES
Thyroid dose is too high. Decrease Synthroid to 100 mcg. Verify on 112 mcg. Name brand only. Recheck TFT in 6-8 weeks.    LDL cholesterol is elevated. Maintain a low saturated fat & low cholesterol diet.     Vitamin D is fine.     Chemistry panel and urinalysis are fine.

## 2020-09-03 DIAGNOSIS — F41.8 OTHER SPECIFIED ANXIETY DISORDERS: Chronic | ICD-10-CM

## 2020-09-03 RX ORDER — LORAZEPAM 0.5 MG/1
TABLET ORAL
Qty: 30 TABLET | Refills: 0 | Status: SHIPPED | OUTPATIENT
Start: 2020-09-03 | End: 2021-01-27

## 2020-09-08 DIAGNOSIS — E03.9 HYPOTHYROIDISM, UNSPECIFIED TYPE: Chronic | ICD-10-CM

## 2020-09-08 RX ORDER — LEVOTHYROXINE SODIUM 0.1 MG/1
100 TABLET ORAL EVERY MORNING
Qty: 90 TABLET | Refills: 3 | Status: SHIPPED | OUTPATIENT
Start: 2020-09-08 | End: 2020-11-24 | Stop reason: SDUPTHER

## 2020-09-08 NOTE — TELEPHONE ENCOUNTER
Caller: Gwendolyn Huddleston    Relationship: Self    Best call back number [phone]    Medication needed:   Requested Prescriptions     Pending Prescriptions Disp Refills   • levothyroxine (SYNTHROID, LEVOTHROID) 100 MCG tablet 90 tablet 0     Sig: Take 1 tablet by mouth Every Morning. 30 min Before breakfast.       When do you need the refill by:ASAP    What details did the patient provide when requesting the medication: Patient stated [facility] does not have RX     Does the patient have less than a 3 day supply:  [x] Yes  [] No    What is the patient's preferred pharmacy: [facility] - [place], [place] - [address] AT PORTAL TO [facility] - [phone] [facility] [phone]

## 2020-10-23 ENCOUNTER — RESULTS ENCOUNTER (OUTPATIENT)
Dept: INTERNAL MEDICINE | Age: 65
End: 2020-10-23

## 2020-10-23 DIAGNOSIS — E03.9 HYPOTHYROIDISM, UNSPECIFIED TYPE: Chronic | ICD-10-CM

## 2020-10-28 LAB
T4 FREE SERPL-MCNC: 1.55 NG/DL (ref 0.93–1.7)
TSH SERPL DL<=0.005 MIU/L-ACNC: 0.67 UIU/ML (ref 0.27–4.2)

## 2020-11-03 ENCOUNTER — HOSPITAL ENCOUNTER (OUTPATIENT)
Dept: BONE DENSITY | Facility: HOSPITAL | Age: 65
Discharge: HOME | End: 2020-11-03
Admitting: INTERNAL MEDICINE

## 2020-11-03 PROCEDURE — 77080 DXA BONE DENSITY AXIAL: CPT

## 2020-11-24 DIAGNOSIS — E03.9 HYPOTHYROIDISM, UNSPECIFIED TYPE: Chronic | ICD-10-CM

## 2020-11-24 RX ORDER — LEVOTHYROXINE SODIUM 0.1 MG/1
100 TABLET ORAL EVERY MORNING
Qty: 90 TABLET | Refills: 3 | Status: SHIPPED | OUTPATIENT
Start: 2020-11-24 | End: 2020-11-30 | Stop reason: SDUPTHER

## 2020-11-24 NOTE — TELEPHONE ENCOUNTER
PATIENT IS CALLING NEEDING A REFILL    levothyroxine (SYNTHROID, LEVOTHROID) 100 MCG     PATIENT IS CALLING NEEDING TO GET THE GENERIC FOR THIS MEDICATION PATIENT ONLY NEEDS THIS MEDICATION FOR MAIL ORDER 90 DAYS SUPPLY    PATIENT HAS CHANGED PHARMACY TO MAIL IN ORDER PHARMACY THROUGH [facility]  [address] [address]  [facility] [id number] [phone]    MEMBER ID#[id number]  PLAN#[id number]     PT#

## 2020-11-30 ENCOUNTER — TELEPHONE (OUTPATIENT)
Dept: INTERNAL MEDICINE | Age: 65
End: 2020-11-30

## 2020-11-30 DIAGNOSIS — E03.9 HYPOTHYROIDISM, UNSPECIFIED TYPE: Chronic | ICD-10-CM

## 2020-11-30 RX ORDER — LEVOTHYROXINE SODIUM 0.1 MG/1
100 TABLET ORAL EVERY MORNING
Qty: 90 TABLET | Refills: 3 | Status: SHIPPED | OUTPATIENT
Start: 2020-11-30 | End: 2020-12-08 | Stop reason: SDUPTHER

## 2020-11-30 NOTE — TELEPHONE ENCOUNTER
Patient is calling to request the following new 90 Day RX to be sent to a different pharmacy due to Insurance.    levothyroxine (SYNTHROID, LEVOTHROID) 100 MCG tablet    [facility] - [place], [place] - [address] AT Portal to [facility] - [phone]  - [phone]   [phone]    Please advise.    Patient call back [phone].

## 2020-12-03 ENCOUNTER — APPOINTMENT (OUTPATIENT)
Dept: WOMENS IMAGING | Facility: HOSPITAL | Age: 65
End: 2020-12-03

## 2020-12-03 PROCEDURE — 77067 SCR MAMMO BI INCL CAD: CPT | Performed by: RADIOLOGY

## 2020-12-03 PROCEDURE — 77063 BREAST TOMOSYNTHESIS BI: CPT | Performed by: RADIOLOGY

## 2020-12-07 ENCOUNTER — TELEPHONE (OUTPATIENT)
Dept: INTERNAL MEDICINE | Age: 65
End: 2020-12-07

## 2020-12-07 DIAGNOSIS — E03.9 HYPOTHYROIDISM, UNSPECIFIED TYPE: Chronic | ICD-10-CM

## 2020-12-07 NOTE — TELEPHONE ENCOUNTER
Unable to reach [facility] care eugene   But rx was sent 11/30/20 for 90 day and 3 refill

## 2020-12-07 NOTE — TELEPHONE ENCOUNTER
Patient called stating that [facility] is denying refills of levothyroxine 100 MCG tablet.  They need Rx faxed to [phone]    Patient has only 5 days worth of medication    [facility] - [place], [place] - [address] AT Portal to [facility] - [phone]  - [phone] FX

## 2020-12-08 RX ORDER — LEVOTHYROXINE SODIUM 0.1 MG/1
100 TABLET ORAL EVERY MORNING
Qty: 90 TABLET | Refills: 3 | Status: SHIPPED | OUTPATIENT
Start: 2020-12-08 | End: 2021-03-08 | Stop reason: DRUGHIGH

## 2020-12-08 NOTE — TELEPHONE ENCOUNTER
Pt called back with fax number [employer] for her script. Fax#[phone] They advised for us to refax the script because they deleted everything out. But now everything should work per the pt. Any questions please call pt at [phone]. She only has 4 pills left.    levothyroxine (SYNTHROID, LEVOTHROID) 100 MCG tablet    [facility] - [place], [place] - [address] AT [address] to [facility] - [phone]  - [phone]   [phone]

## 2021-01-18 ENCOUNTER — IMMUNIZATION (OUTPATIENT)
Dept: VACCINE CLINIC | Facility: HOSPITAL | Age: 66
End: 2021-01-18

## 2021-01-18 PROCEDURE — 0001A: CPT | Performed by: INTERNAL MEDICINE

## 2021-01-18 PROCEDURE — 91300 HC SARSCOV02 VAC 30MCG/0.3ML IM: CPT | Performed by: INTERNAL MEDICINE

## 2021-01-26 DIAGNOSIS — F41.8 OTHER SPECIFIED ANXIETY DISORDERS: Chronic | ICD-10-CM

## 2021-01-27 RX ORDER — LORAZEPAM 0.5 MG/1
TABLET ORAL
Qty: 30 TABLET | Refills: 0 | Status: SHIPPED | OUTPATIENT
Start: 2021-01-27 | End: 2021-05-20 | Stop reason: SDUPTHER

## 2021-02-08 ENCOUNTER — IMMUNIZATION (OUTPATIENT)
Dept: VACCINE CLINIC | Facility: HOSPITAL | Age: 66
End: 2021-02-08

## 2021-02-08 PROCEDURE — 91300 HC SARSCOV02 VAC 30MCG/0.3ML IM: CPT | Performed by: INTERNAL MEDICINE

## 2021-02-08 PROCEDURE — 0002A: CPT | Performed by: INTERNAL MEDICINE

## 2021-03-01 ENCOUNTER — OFFICE VISIT (OUTPATIENT)
Dept: INTERNAL MEDICINE | Age: 66
End: 2021-03-01

## 2021-03-01 VITALS
SYSTOLIC BLOOD PRESSURE: 110 MMHG | HEART RATE: 77 BPM | TEMPERATURE: 96.9 F | OXYGEN SATURATION: 96 % | WEIGHT: 133 LBS | HEIGHT: 63 IN | BODY MASS INDEX: 23.57 KG/M2 | DIASTOLIC BLOOD PRESSURE: 76 MMHG

## 2021-03-01 DIAGNOSIS — F41.8 OTHER SPECIFIED ANXIETY DISORDERS: Chronic | ICD-10-CM

## 2021-03-01 DIAGNOSIS — Z51.81 THERAPEUTIC DRUG MONITORING: ICD-10-CM

## 2021-03-01 DIAGNOSIS — E03.9 HYPOTHYROIDISM, UNSPECIFIED TYPE: Primary | Chronic | ICD-10-CM

## 2021-03-01 PROCEDURE — 99214 OFFICE O/P EST MOD 30 MIN: CPT | Performed by: INTERNAL MEDICINE

## 2021-03-01 NOTE — ASSESSMENT & PLAN NOTE
Little more anxious and using lorazepam more frequently than before. Advised to monitor caffeine intake. Check UDS and TSH and Free T4. Contract updated.

## 2021-03-01 NOTE — ASSESSMENT & PLAN NOTE
Check TSH and Free T4. Due to anxiety, will see about needing to lower the dose a bit pending lab results.

## 2021-03-01 NOTE — PROGRESS NOTES
"    I N T E R N A L  M E D I C I N E  J U N O H  K I M,  M D      ENCOUNTER DATE:  03/01/2021    Gwendolyn Huddleston / 65 y.o. / female      CHIEF COMPLAINT / REASON FOR OFFICE VISIT     Hypothyroidism and Other specified anxiety disorders ( need UDS)      ASSESSMENT & PLAN     Problem List Items Addressed This Visit     Hypothyroidism - Primary (Chronic)    Overview     *Name brand Synthroid only         Current Assessment & Plan     Check TSH and Free T4. Due to anxiety, will see about needing to lower the dose a bit pending lab results.         Relevant Medications    levothyroxine (SYNTHROID, LEVOTHROID) 100 MCG tablet    Other Relevant Orders    TSH+Free T4    Other specified anxiety disorders (Chronic)    Overview     Has been on lorazepam taken as needed for episodic anxiety         Current Assessment & Plan     Little more anxious and using lorazepam more frequently than before. Advised to monitor caffeine intake. Check UDS and TSH and Free T4. Contract updated.          Relevant Medications    LORazepam (ATIVAN) 0.5 MG tablet    Other Relevant Orders    Compliance Drug Analysis, Ur - Urine, Clean Catch      Other Visit Diagnoses     Therapeutic drug monitoring        Relevant Orders    Compliance Drug Analysis, Ur - Urine, Clean Catch        Orders Placed This Encounter   Procedures   • TSH+Free T4   • Compliance Drug Analysis, Ur - Urine, Clean Catch     No orders of the defined types were placed in this encounter.      SUMMARY/DISCUSSION  •       Next Appointment with me: Visit date not found    Return in about 6 months (around 9/1/2021) for WELCOME TO MEDICARE VISIT (30 MIN).      VITAL SIGNS     Visit Vitals  /76   Pulse 77   Temp 96.9 °F (36.1 °C)   Ht 160 cm (63\")   Wt 60.3 kg (133 lb)   LMP  (LMP Unknown)   SpO2 96%   BMI 23.56 kg/m²       BP Readings from Last 3 Encounters:   03/01/21 110/76   10/09/20 127/81   09/01/20 124/78     Wt Readings from Last 3 Encounters:   03/01/21 60.3 kg (133 lb)   "   10/09/20 62.1 kg (137 lb)   09/01/20 62.6 kg (138 lb)     Body mass index is 23.56 kg/m².      MEDICATIONS AT THE TIME OF OFFICE VISIT     Current Outpatient Medications on File Prior to Visit   Medication Sig   • acetaminophen (TYLENOL) 500 MG tablet Take 1 tablet by mouth Every 8 (Eight) Hours As Needed for Mild Pain  or Moderate Pain .   • levothyroxine (SYNTHROID, LEVOTHROID) 100 MCG tablet Take 1 tablet by mouth Every Morning. 30 min Before breakfast.   • LORazepam (ATIVAN) 0.5 MG tablet TAKE ONE TABLET BY MOUTH DAILY AS NEEDED FOR ANXIETY     No current facility-administered medications on file prior to visit.           HISTORY OF PRESENT ILLNESS     Increased anxiety symptoms and taking lorazepam little more frequently than before. Not depressed.   Wt is lower by 3 lbs. On Synthroid 100 mcg for hypothyroidism.         REVIEW OF SYSTEMS     Mild wt loss  No palpitations or chest pain   GI neg for change  Increased anxiety, not depressed         PHYSICAL EXAMINATION     Physical Exam  Little more anxious  Cardiovascular: Normal rate, regular rhythm.       REVIEWED DATA     Labs:     Lab Results   Component Value Date     09/01/2020    K 4.2 09/01/2020    AST 19 09/01/2020    ALT 21 09/01/2020    BUN 17 09/01/2020    CREATININE 0.67 09/01/2020    CREATININE 0.73 01/29/2020    CREATININE 0.60 11/22/2019    EGFRIFNONA 89 09/01/2020    EGFRIFAFRI 107 09/01/2020       Lab Results   Component Value Date    HGBA1C 5.46 01/21/2019       Lab Results   Component Value Date     (H) 09/01/2020     (H) 01/21/2019    HDL 65 (H) 09/01/2020    TRIG 110 09/01/2020       Lab Results   Component Value Date    TSH 0.670 10/28/2020    FREET4 1.55 10/28/2020       Lab Results   Component Value Date    WBC 6.26 09/01/2020    HGB 13.9 09/01/2020     09/01/2020         Imaging:           Medical Tests:           Summary of old records / correspondence / consultant report:           Request outside records:              *Examiner was wearing KN95 mask, face shield and exam gloves during the entire duration of the visit. Patient was masked the entire time.   Minimum social distance of 6 ft maintained entire visit except if physical contact was necessary as documented.     **Dragon Disclaimer:   Much of this encounter note is an electronic transcription/translation of spoken language to printed text. The electronic translation of spoken language may permit erroneous, or at times, nonsensical words or phrases to be inadvertently transcribed. Although I have reviewed the note for such errors, some may still exist.     Template created by Ingrid Hallman MD

## 2021-03-05 LAB
DRUGS UR: NORMAL
T4 FREE SERPL-MCNC: 1.46 NG/DL (ref 0.82–1.77)
TSH SERPL DL<=0.005 MIU/L-ACNC: 0.44 UIU/ML (ref 0.45–4.5)

## 2021-03-05 NOTE — PROGRESS NOTES
Call patient with her test result(s) and mail the results to her if MyChart is NOT active.    Decrease Synthroid to 88 mcg (verify taking 100 mcg). GIULIA.   Recheck TSH and Free T4 in 2 months.

## 2021-03-08 ENCOUNTER — TELEPHONE (OUTPATIENT)
Dept: INTERNAL MEDICINE | Age: 66
End: 2021-03-08

## 2021-03-08 DIAGNOSIS — E03.9 HYPOTHYROIDISM, UNSPECIFIED TYPE: Chronic | ICD-10-CM

## 2021-03-08 RX ORDER — LEVOTHYROXINE SODIUM 88 MCG
88 TABLET ORAL
Qty: 30 TABLET | Refills: 3 | Status: SHIPPED | OUTPATIENT
Start: 2021-03-08 | End: 2021-03-09 | Stop reason: SDUPTHER

## 2021-03-08 NOTE — TELEPHONE ENCOUNTER
Caller: Gwendolyn Huddleston    Relationship to patient: Self    Best call back number: [phone]    Patient is needing:     PATIENT WAS ON A NAME BRAND MEDICATION/ TURNED 65 AND WENT TO GENERIC AND NOW HER THYROID IS MESSED UP AND THEY'RE WANTING TO SWITCH HER BACK TO NAME BRAND BUT ITS TOO EXPENSIVE.     CAN A NURSE PLEASE CALL: [phone]

## 2021-03-09 DIAGNOSIS — E03.9 HYPOTHYROIDISM, UNSPECIFIED TYPE: Chronic | ICD-10-CM

## 2021-03-09 RX ORDER — LEVOTHYROXINE SODIUM 88 UG/1
88 TABLET ORAL
Qty: 90 TABLET | Refills: 0 | Status: SHIPPED | OUTPATIENT
Start: 2021-03-09 | End: 2021-03-11 | Stop reason: SDUPTHER

## 2021-03-09 NOTE — TELEPHONE ENCOUNTER
PATIENT CALLED IN AND STATED SHE CALLED YESTERDAY REQUESTING THE GENERIC LEVOTHYROXINE FOR SYNTHROID DUE TO INSURANCE WILL NOT COVER NAME BRAND. SHE IS REQUESTING THE MEDICATION BE CALLED SENT TO      [facility] - [place], [place] - [address] AT Portal to [facility] - [phone]  - [phone] FX  [phone]    CALL BACK NUMBER [phone]

## 2021-03-11 RX ORDER — LEVOTHYROXINE SODIUM 88 UG/1
88 TABLET ORAL
Qty: 10 TABLET | Refills: 0 | Status: SHIPPED | OUTPATIENT
Start: 2021-03-11 | End: 2021-05-18 | Stop reason: SDUPTHER

## 2021-03-11 NOTE — TELEPHONE ENCOUNTER
PATIENT CALLED STATING THAT SHE NEEDS A 10-DAY SUPPLY OF HER PRESCRIPTION FOR THE LEVOTHYROXINE (SYNTHROID) 88 MCG TABLET SENT TO THE PHARMACY AT [facility] ON [address] (PHONE NUMBER: [phone]).    THE PATIENT STATED THAT THIS PRESCRIPTION WAS SENT TO HER MAIL ORDER PHARMACY, [facility] (PHONE NUMBER: [phone]), BUT SHE IS LEAVING TO GO OUT OF TOWN THIS SATURDAY (3/13/21), AND THIS PRESCRIPTION WILL NOT ARRIVE AT HER HOUSE BEFORE SHE LEAVES.    THE PATIENT STATED THAT SHE TAKES 1 TABLET IN THE MORNING, AND BECAUSE THIS IS A NEW DOSE OF THIS MEDICATION FOR HER, SHE DOES NOT CURRENTLY HAVE ANY OF THIS MEDICATION ON HAND.    PLEASE CALL THE PATIENT [phone] WHEN THIS MESSAGE HAS BEEN RECEIVED AND INFORM HER OF THE STATUS OF THIS REQUEST.

## 2021-05-17 ENCOUNTER — TELEPHONE (OUTPATIENT)
Dept: INTERNAL MEDICINE | Age: 66
End: 2021-05-17

## 2021-05-17 DIAGNOSIS — E03.9 HYPOTHYROIDISM, UNSPECIFIED TYPE: Primary | ICD-10-CM

## 2021-05-17 NOTE — TELEPHONE ENCOUNTER
Caller: Gwendolyn Huddleston    Relationship: Self    Best call back number: [phone]    Caller requesting test results: PATIENT    What test was performed: LABS    When was the test performed: 05/06/21    Where was the test performed: [facility]    Additional notes: PATIENT IS CALLING TO REQUEST A CALL TO DISCUSS LABS.    PLEASE ADVISE.

## 2021-05-18 DIAGNOSIS — E03.9 HYPOTHYROIDISM, UNSPECIFIED TYPE: Chronic | ICD-10-CM

## 2021-05-18 RX ORDER — LEVOTHYROXINE SODIUM 88 MCG
88 TABLET ORAL
Qty: 90 TABLET | Refills: 1 | Status: SHIPPED | OUTPATIENT
Start: 2021-05-18 | End: 2021-05-19 | Stop reason: SDUPTHER

## 2021-05-18 NOTE — TELEPHONE ENCOUNTER
Patient wanted to speak with deyanira regarding thyroid meds     Patient contact info is correct in chart

## 2021-05-18 NOTE — TELEPHONE ENCOUNTER
Pt think her labs bouncing around because she went to generic     She would like to go back on brand name . advise

## 2021-05-19 DIAGNOSIS — E03.9 HYPOTHYROIDISM, UNSPECIFIED TYPE: Chronic | ICD-10-CM

## 2021-05-19 DIAGNOSIS — F41.8 OTHER SPECIFIED ANXIETY DISORDERS: Chronic | ICD-10-CM

## 2021-05-19 RX ORDER — LEVOTHYROXINE SODIUM 88 MCG
88 TABLET ORAL
Qty: 90 TABLET | Refills: 1 | Status: SHIPPED | OUTPATIENT
Start: 2021-05-19 | End: 2021-06-02 | Stop reason: SDUPTHER

## 2021-05-19 RX ORDER — LORAZEPAM 0.5 MG/1
0.5 TABLET ORAL DAILY PRN
Qty: 30 TABLET | Refills: 0 | Status: CANCELLED | OUTPATIENT
Start: 2021-05-19

## 2021-05-19 NOTE — TELEPHONE ENCOUNTER
Caller: Gwendolyn Huddleston    Relationship: Self    Best call back number: [phone]    Medication needed:   Requested Prescriptions     Pending Prescriptions Disp Refills   • Synthroid 88 MCG tablet 90 tablet 1     Sig: Take 1 tablet by mouth Every Morning Before Breakfast.         What additional details did the patient provide when requesting the medication: MAIL PHARMACY NEVER RECEIVED NEW PRESCRIPTION FOR SYNTHROID AND IS TRYING TO REFILL LEVOTHYROXINE.     Does the patient have less than a 3 day supply:  [] Yes  [x] No    What is the patient's preferred pharmacy: [facility] - [place], [place] - [address] AT PORTAL TO [facility] - [phone]  - [phone] FX

## 2021-05-19 NOTE — TELEPHONE ENCOUNTER
Caller: Gwendolyn Huddleston    Relationship: Self    Best call back number: [phone]    Medication needed:   Requested Prescriptions     Pending Prescriptions Disp Refills   • LORazepam (ATIVAN) 0.5 MG tablet 30 tablet 0     Sig: Take 1 tablet by mouth Daily As Needed for Anxiety.       When do you need the refill by: TODAY 05/19/2021    What additional details did the patient provide when requesting the medication: PATIENT IS COMPLETELY OUT OF MEDICATION    Does the patient have less than a 3 day supply:  [x] Yes  [] No    What is the patient's preferred pharmacy: [facility] [address] [facility] [address] [facility] [phone] [facility] [phone] FX

## 2021-05-20 RX ORDER — LORAZEPAM 0.5 MG/1
0.5 TABLET ORAL DAILY PRN
Qty: 30 TABLET | Refills: 0 | Status: SHIPPED | OUTPATIENT
Start: 2021-05-20 | End: 2021-12-09 | Stop reason: SDUPTHER

## 2021-06-02 DIAGNOSIS — E03.9 HYPOTHYROIDISM, UNSPECIFIED TYPE: Chronic | ICD-10-CM

## 2021-06-02 RX ORDER — LEVOTHYROXINE SODIUM 88 UG/1
88 TABLET ORAL
Qty: 90 TABLET | Refills: 1 | Status: SHIPPED | OUTPATIENT
Start: 2021-06-02 | End: 2021-12-08 | Stop reason: DRUGHIGH

## 2021-08-16 ENCOUNTER — OFFICE VISIT (OUTPATIENT)
Dept: INTERNAL MEDICINE | Age: 66
End: 2021-08-16

## 2021-08-16 VITALS
WEIGHT: 134 LBS | HEART RATE: 78 BPM | BODY MASS INDEX: 23.74 KG/M2 | DIASTOLIC BLOOD PRESSURE: 80 MMHG | TEMPERATURE: 98.5 F | SYSTOLIC BLOOD PRESSURE: 126 MMHG | HEIGHT: 63 IN | OXYGEN SATURATION: 98 %

## 2021-08-16 DIAGNOSIS — J20.9 ACUTE BRONCHITIS, UNSPECIFIED ORGANISM: Primary | ICD-10-CM

## 2021-08-16 DIAGNOSIS — R05.9 COUGH: ICD-10-CM

## 2021-08-16 PROCEDURE — 99213 OFFICE O/P EST LOW 20 MIN: CPT | Performed by: NURSE PRACTITIONER

## 2021-08-16 RX ORDER — AZITHROMYCIN 250 MG/1
TABLET, FILM COATED ORAL
Qty: 6 TABLET | Refills: 0 | Status: SHIPPED | OUTPATIENT
Start: 2021-08-16 | End: 2021-12-07

## 2021-08-16 RX ORDER — METHYLPREDNISOLONE 4 MG/1
TABLET ORAL
Qty: 1 EACH | Refills: 0 | Status: SHIPPED | OUTPATIENT
Start: 2021-08-16 | End: 2021-12-07

## 2021-08-16 NOTE — PROGRESS NOTES
"    I N T E R N A L  M E D I C I N E  SKY SILVA, APRN      ENCOUNTER DATE:  08/16/2021    Gwendolyn Huddleston / 65 y.o. / female      CHIEF COMPLAINT / REASON FOR OFFICE VISIT     Cough (heavy feeling in chest) and Fatigue      ASSESSMENT & PLAN     1. Acute bronchitis, unspecified organism  -Medrol pack with right upper lobe wheezing  -Z-Martin    2. Cough  - COVID-19,[facility] ROUTINE, NP/OP SWAB IN TRANSPORT MEDIA OR ESWAB 72 HR TAT - Swab, Oropharynx    Orders Placed This Encounter   Procedures   • COVID-19,[facility] ROUTINE, NP/OP SWAB IN TRANSPORT MEDIA OR ESWAB 72 HR TAT - Swab, Oropharynx     New Medications Ordered This Visit   Medications   • methylPREDNISolone (MEDROL) 4 MG dose pack     Sig: Take as directed on package instructions.     Dispense:  1 each     Refill:  0   • azithromycin (Zithromax Z-Martin) 250 MG tablet     Sig: Take 2 tablets by mouth on day 1, then 1 tablet daily on days 2-5     Dispense:  6 tablet     Refill:  0       SUMMARY/DISCUSSION  • Follow-up if symptoms do not improve or worsen  • Emergency setting if she develops extensive shortness of air  • Self-isolation until COVID-19 results      Next Appointment with me: Visit date not found    Return if symptoms worsen or fail to improve.      VITAL SIGNS     Visit Vitals  /80   Pulse 78   Temp 98.5 °F (36.9 °C) (Temporal)   Ht 160 cm (63\")   Wt 60.8 kg (134 lb) Comment: stated   LMP  (LMP Unknown)   SpO2 98%   BMI 23.74 kg/m²       Wt Readings from Last 3 Encounters:   08/16/21 60.8 kg (134 lb)   03/01/21 60.3 kg (133 lb)   10/09/20 62.1 kg (137 lb)     Body mass index is 23.74 kg/m².      MEDICATIONS AT THE TIME OF OFFICE VISIT     Current Outpatient Medications on File Prior to Visit   Medication Sig   • acetaminophen (TYLENOL) 500 MG tablet Take 1 tablet by mouth Every 8 (Eight) Hours As Needed for Mild Pain  or Moderate Pain .   • levothyroxine (Synthroid) 88 MCG tablet Take 1 tablet by mouth Every Morning Before Breakfast.   • LORazepam " (ATIVAN) 0.5 MG tablet Take 1 tablet by mouth Daily As Needed for Anxiety.     No current facility-administered medications on file prior to visit.         HISTORY OF PRESENT ILLNESS     Patient presents with 1 week of productive cough and significant fatigue.  States that symptoms began as a sore throat and produced to a cough along with what she felt was a head cold with nasal congestion.  She denies any shortness of breath but does admit to some chest tightness.  Denies any fever, chills or malaise.  No GI symptoms.  No loss of taste or smell.  Fully vaccinated against COVID-19 was no known sick contacts.    REVIEW OF SYSTEMS     Constitutional neg except per HPI   ENT nasal congestion  Resp cough   CV neg    PHYSICAL EXAMINATION     Physical Exam  Constitutional  No distress  Cardiovascular Rate  normal . Rhythm: regular . Heart sounds:  normal  Pulmonary/Chest  Effort normal. Breath sounds: Right upper lobe wheezing  Psychiatric  Alert. Judgment and thought content normal. Mood normal       REVIEWED DATA     Labs:         Imaging:           Medical Tests:             Summary of old records / correspondence / consultant report:           Request outside records:           *Examiner was wearing medical surgical mask, face shield and exam gloves during the entire duration of the visit. Patient was masked the entire time.   Minimum social distance of 6 ft maintained entire visit except if physical contact was necessary as documented.     **Dragon Disclaimer:   Much of this encounter note is an electronic transcription/translation of spoken language to printed text. The electronic translation of spoken language may permit erroneous, or at times, nonsensical words or phrases to be inadvertently transcribed. Although I have reviewed the note for such errors, some may still exist.

## 2021-08-17 LAB
LABCORP SARS-COV-2, NAA 2 DAY TAT: NORMAL
SARS-COV-2 RNA RESP QL NAA+PROBE: NOT DETECTED

## 2021-09-09 ENCOUNTER — TELEPHONE (OUTPATIENT)
Dept: INTERNAL MEDICINE | Age: 66
End: 2021-09-09

## 2021-09-09 NOTE — TELEPHONE ENCOUNTER
----- Message from Darrian Hallman MD sent at 9/9/2021  1:31 AM EDT -----  Regarding: Call patient: Schedule AWV with APRN for this calendar year  Please call patient to schedule AWV with APRN for this calendar year.   Overdue on AWV.

## 2021-09-30 ENCOUNTER — FLU SHOT (OUTPATIENT)
Dept: INTERNAL MEDICINE | Age: 66
End: 2021-09-30

## 2021-09-30 DIAGNOSIS — Z23 NEED FOR INFLUENZA VACCINATION: Primary | ICD-10-CM

## 2021-09-30 PROCEDURE — 90662 IIV NO PRSV INCREASED AG IM: CPT | Performed by: INTERNAL MEDICINE

## 2021-09-30 PROCEDURE — G0008 ADMIN INFLUENZA VIRUS VAC: HCPCS | Performed by: INTERNAL MEDICINE

## 2021-10-27 DIAGNOSIS — F41.8 OTHER SPECIFIED ANXIETY DISORDERS: Chronic | ICD-10-CM

## 2021-10-28 NOTE — TELEPHONE ENCOUNTER
No show last appointment. Must be seen prior to refill of medication. Will need UDS at that visit.

## 2021-10-29 RX ORDER — LORAZEPAM 0.5 MG/1
TABLET ORAL
Qty: 30 TABLET | OUTPATIENT
Start: 2021-10-29

## 2021-11-11 DIAGNOSIS — E03.9 HYPOTHYROIDISM, UNSPECIFIED TYPE: Chronic | ICD-10-CM

## 2021-11-12 RX ORDER — LEVOTHYROXINE SODIUM 88 UG/1
TABLET ORAL
Qty: 90 TABLET | Refills: 3 | OUTPATIENT
Start: 2021-11-12

## 2021-11-30 ENCOUNTER — IMMUNIZATION (OUTPATIENT)
Dept: VACCINE CLINIC | Facility: HOSPITAL | Age: 66
End: 2021-11-30

## 2021-11-30 PROCEDURE — 91300 HC SARSCOV02 VAC 30MCG/0.3ML IM: CPT | Performed by: INTERNAL MEDICINE

## 2021-11-30 PROCEDURE — 0004A HC ADM SARSCOV2 30MCG/0.3ML BOOSTER: CPT | Performed by: INTERNAL MEDICINE

## 2021-12-01 ENCOUNTER — TELEPHONE (OUTPATIENT)
Dept: INTERNAL MEDICINE | Age: 66
End: 2021-12-01

## 2021-12-01 NOTE — TELEPHONE ENCOUNTER
Spoke with pt  and informed will see APRN . Will discuss during visit if blood work needed . Expressed understanding

## 2021-12-01 NOTE — TELEPHONE ENCOUNTER
Provider: DR JHAVERI  Caller: MEGHAN BARKER  Relationship to Patient: PATIENT  Phone Number: [phone]  Reason for Call: PATIENT HAS AN APPOINTMENT ON 12/7/2021 TO HAVE HER THYROID CHECKED AND TO GET REFILLS ON MEDICATION.  SHE WOULD LIKE TO KNOW IF SHE WILL HAVE FASTING LABS FOR THIS APPOINTMENT.  PLEASE CALL AND ADVISE.

## 2021-12-07 ENCOUNTER — OFFICE VISIT (OUTPATIENT)
Dept: INTERNAL MEDICINE | Age: 66
End: 2021-12-07

## 2021-12-07 VITALS
HEIGHT: 63 IN | BODY MASS INDEX: 24.1 KG/M2 | SYSTOLIC BLOOD PRESSURE: 124 MMHG | OXYGEN SATURATION: 97 % | DIASTOLIC BLOOD PRESSURE: 80 MMHG | TEMPERATURE: 97.5 F | HEART RATE: 69 BPM | WEIGHT: 136 LBS

## 2021-12-07 DIAGNOSIS — E03.9 HYPOTHYROIDISM, UNSPECIFIED TYPE: Primary | ICD-10-CM

## 2021-12-07 DIAGNOSIS — F41.8 OTHER SPECIFIED ANXIETY DISORDERS: ICD-10-CM

## 2021-12-07 DIAGNOSIS — H93.19 TINNITUS, UNSPECIFIED LATERALITY: ICD-10-CM

## 2021-12-07 DIAGNOSIS — Z51.81 THERAPEUTIC DRUG MONITORING: ICD-10-CM

## 2021-12-07 PROCEDURE — 99214 OFFICE O/P EST MOD 30 MIN: CPT | Performed by: NURSE PRACTITIONER

## 2021-12-07 NOTE — PROGRESS NOTES
"Chief Complaint  Hypothyroidism and Anxiety    Subjective          Gwendolyn Huddleston presents to [facility]  Thyroid Problem  Presents for follow-up visit. Symptoms include anxiety. Patient reports no constipation, depressed mood, hair loss, weight gain or weight loss. The symptoms have been stable.   Anxiety  Presents for follow-up visit. Symptoms include nervous/anxious behavior. Patient reports no depressed mood. Symptoms occur occasionally. The severity of symptoms is moderate.     Compliance with medications is %.   Seen by audiologist recently for c/o tinnitus x 1 year.     Objective   Vital Signs:   /80   Pulse 69   Temp 97.5 °F (36.4 °C) (Temporal)   Ht 160 cm (62.99\")   Wt 61.7 kg (136 lb)   SpO2 97%   BMI 24.10 kg/m²     Physical Exam  Vitals and nursing note reviewed.   Constitutional:       General: She is not in acute distress.     Appearance: She is well-developed. She is not ill-appearing.   Neck:      Thyroid: No thyroid mass, thyromegaly or thyroid tenderness.   Cardiovascular:      Rate and Rhythm: Normal rate and regular rhythm.      Heart sounds: Normal heart sounds, S1 normal and S2 normal. No murmur heard.      Pulmonary:      Effort: Pulmonary effort is normal.      Breath sounds: Normal breath sounds. No decreased breath sounds, wheezing, rhonchi or rales.   Skin:     General: Skin is warm and dry.   Neurological:      Mental Status: She is alert and oriented to person, place, and time.   Psychiatric:         Speech: Speech normal.         Behavior: Behavior normal. Behavior is cooperative.         Thought Content: Thought content normal.         Judgment: Judgment normal.        Result Review :   The following data was reviewed by: SARAH Maxwell on 12/07/2021:             Assessment and Plan    Diagnoses and all orders for this visit:    1. Hypothyroidism, unspecified type (Primary)  -     CBC & Differential  -     Comprehensive Metabolic " Panel  -     Lipid Panel With / Chol / HDL Ratio  -     T4, Free  -     TSH    2. Therapeutic drug monitoring  UDS complete and controlled substance agreement up to date. PDMP query complete and reviewed; Patient is having medication refilled at expected intervals. Using medication appropriately without evidence of unusual increased use, abuse, or diverting.       -     Compliance Drug Analysis, Ur - Urine, Clean Catch    3. Other specified anxiety disorders        Follow Up   Return in about 6 months (around 6/7/2022) for Next scheduled follow up.  Patient was given instructions and counseling regarding her condition or for health maintenance advice. Please see specific information pulled into the AVS if appropriate.

## 2021-12-08 DIAGNOSIS — E03.9 HYPOTHYROIDISM, UNSPECIFIED TYPE: Primary | ICD-10-CM

## 2021-12-08 LAB
ALBUMIN SERPL-MCNC: 4.5 G/DL (ref 3.8–4.8)
ALBUMIN/GLOB SERPL: 1.7 {RATIO} (ref 1.2–2.2)
ALP SERPL-CCNC: 54 IU/L (ref 44–121)
ALT SERPL-CCNC: 21 IU/L (ref 0–32)
AST SERPL-CCNC: 24 IU/L (ref 0–40)
BASOPHILS # BLD AUTO: 0.1 X10E3/UL (ref 0–0.2)
BASOPHILS NFR BLD AUTO: 1 %
BILIRUB SERPL-MCNC: 0.6 MG/DL (ref 0–1.2)
BUN SERPL-MCNC: 15 MG/DL (ref 8–27)
BUN/CREAT SERPL: 22 (ref 12–28)
CALCIUM SERPL-MCNC: 9.2 MG/DL (ref 8.7–10.3)
CHLORIDE SERPL-SCNC: 101 MMOL/L (ref 96–106)
CHOLEST SERPL-MCNC: 224 MG/DL (ref 100–199)
CHOLEST/HDLC SERPL: 2.9 RATIO (ref 0–4.4)
CO2 SERPL-SCNC: 24 MMOL/L (ref 20–29)
CREAT SERPL-MCNC: 0.67 MG/DL (ref 0.57–1)
EOSINOPHIL # BLD AUTO: 0.3 X10E3/UL (ref 0–0.4)
EOSINOPHIL NFR BLD AUTO: 4 %
ERYTHROCYTE [DISTWIDTH] IN BLOOD BY AUTOMATED COUNT: 12.3 % (ref 11.7–15.4)
GLOBULIN SER CALC-MCNC: 2.6 G/DL (ref 1.5–4.5)
GLUCOSE SERPL-MCNC: 87 MG/DL (ref 65–99)
HCT VFR BLD AUTO: 40.7 % (ref 34–46.6)
HDLC SERPL-MCNC: 77 MG/DL
HGB BLD-MCNC: 13.7 G/DL (ref 11.1–15.9)
IMM GRANULOCYTES # BLD AUTO: 0 X10E3/UL (ref 0–0.1)
IMM GRANULOCYTES NFR BLD AUTO: 0 %
LDLC SERPL CALC-MCNC: 129 MG/DL (ref 0–99)
LYMPHOCYTES # BLD AUTO: 2.3 X10E3/UL (ref 0.7–3.1)
LYMPHOCYTES NFR BLD AUTO: 30 %
MCH RBC QN AUTO: 31.4 PG (ref 26.6–33)
MCHC RBC AUTO-ENTMCNC: 33.7 G/DL (ref 31.5–35.7)
MCV RBC AUTO: 93 FL (ref 79–97)
MONOCYTES # BLD AUTO: 0.5 X10E3/UL (ref 0.1–0.9)
MONOCYTES NFR BLD AUTO: 6 %
NEUTROPHILS # BLD AUTO: 4.6 X10E3/UL (ref 1.4–7)
NEUTROPHILS NFR BLD AUTO: 59 %
PLATELET # BLD AUTO: 238 X10E3/UL (ref 150–450)
POTASSIUM SERPL-SCNC: 4 MMOL/L (ref 3.5–5.2)
PROT SERPL-MCNC: 7.1 G/DL (ref 6–8.5)
RBC # BLD AUTO: 4.37 X10E6/UL (ref 3.77–5.28)
SODIUM SERPL-SCNC: 139 MMOL/L (ref 134–144)
T4 FREE SERPL-MCNC: 1.22 NG/DL (ref 0.82–1.77)
TRIGL SERPL-MCNC: 103 MG/DL (ref 0–149)
TSH SERPL DL<=0.005 MIU/L-ACNC: 5.28 UIU/ML (ref 0.45–4.5)
VLDLC SERPL CALC-MCNC: 18 MG/DL (ref 5–40)
WBC # BLD AUTO: 7.7 X10E3/UL (ref 3.4–10.8)

## 2021-12-08 RX ORDER — LEVOTHYROXINE SODIUM 0.1 MG/1
100 TABLET ORAL DAILY
Qty: 90 TABLET | Refills: 0 | Status: SHIPPED | OUTPATIENT
Start: 2021-12-08 | End: 2022-02-22 | Stop reason: SDUPTHER

## 2021-12-09 ENCOUNTER — APPOINTMENT (OUTPATIENT)
Dept: WOMENS IMAGING | Facility: HOSPITAL | Age: 66
End: 2021-12-09

## 2021-12-09 DIAGNOSIS — F41.8 OTHER SPECIFIED ANXIETY DISORDERS: Chronic | ICD-10-CM

## 2021-12-09 RX ORDER — LORAZEPAM 0.5 MG/1
0.5 TABLET ORAL DAILY PRN
Qty: 30 TABLET | Refills: 2 | Status: SHIPPED | OUTPATIENT
Start: 2021-12-09 | End: 2022-07-12

## 2021-12-09 NOTE — TELEPHONE ENCOUNTER
lov 3/1/21, tyler rich 12/7/21  Nov 6/28/22  Contract 3/1/21  UDS still pending pt had it done 12/7/21

## 2021-12-14 ENCOUNTER — APPOINTMENT (OUTPATIENT)
Dept: WOMENS IMAGING | Facility: HOSPITAL | Age: 66
End: 2021-12-14

## 2021-12-14 LAB — DRUGS UR: NORMAL

## 2021-12-14 PROCEDURE — 77067 SCR MAMMO BI INCL CAD: CPT | Performed by: RADIOLOGY

## 2021-12-14 PROCEDURE — 77063 BREAST TOMOSYNTHESIS BI: CPT | Performed by: RADIOLOGY

## 2021-12-16 DIAGNOSIS — R92.8 ABNORMAL MAMMOGRAM: Primary | ICD-10-CM

## 2021-12-22 DIAGNOSIS — R92.8 ABNORMAL MAMMOGRAM: Primary | ICD-10-CM

## 2022-01-03 ENCOUNTER — APPOINTMENT (OUTPATIENT)
Dept: WOMENS IMAGING | Facility: HOSPITAL | Age: 67
End: 2022-01-03

## 2022-01-03 PROCEDURE — G0279 TOMOSYNTHESIS, MAMMO: HCPCS | Performed by: RADIOLOGY

## 2022-01-03 PROCEDURE — 77065 DX MAMMO INCL CAD UNI: CPT | Performed by: RADIOLOGY

## 2022-01-03 PROCEDURE — 76641 ULTRASOUND BREAST COMPLETE: CPT | Performed by: RADIOLOGY

## 2022-01-05 ENCOUNTER — TELEPHONE (OUTPATIENT)
Dept: OBSTETRICS AND GYNECOLOGY | Age: 67
End: 2022-01-05

## 2022-01-05 NOTE — TELEPHONE ENCOUNTER
Patient called requesting information on her recent Breast U/S and Mammo. States she had it done on 01/03/2021. She states that she is feeling very nervous about the results and would like to know if Dr. Ramos can let her know the results as soon as he is able to. Let the patient know that it can take several days for the results to come to us from [facility] and that we will let her know the results as soon as the results are released. Patient stated understanding, patient would like to know if DR. Ramos can request the results to get them sooner.

## 2022-01-06 DIAGNOSIS — R92.8 ABNORMAL MAMMOGRAM: Primary | ICD-10-CM

## 2022-01-19 ENCOUNTER — APPOINTMENT (OUTPATIENT)
Dept: WOMENS IMAGING | Facility: HOSPITAL | Age: 67
End: 2022-01-19

## 2022-01-19 PROCEDURE — 76942 ECHO GUIDE FOR BIOPSY: CPT | Performed by: RADIOLOGY

## 2022-01-19 PROCEDURE — 19000 PUNCTURE ASPIR CYST BREAST: CPT | Performed by: RADIOLOGY

## 2022-02-22 DIAGNOSIS — E03.9 HYPOTHYROIDISM, UNSPECIFIED TYPE: ICD-10-CM

## 2022-02-22 RX ORDER — LEVOTHYROXINE SODIUM 0.1 MG/1
100 TABLET ORAL DAILY
Qty: 90 TABLET | Refills: 3 | Status: SHIPPED | OUTPATIENT
Start: 2022-02-22 | End: 2023-01-03

## 2022-06-28 ENCOUNTER — OFFICE VISIT (OUTPATIENT)
Dept: INTERNAL MEDICINE | Age: 67
End: 2022-06-28

## 2022-06-28 VITALS
HEIGHT: 63 IN | DIASTOLIC BLOOD PRESSURE: 70 MMHG | HEART RATE: 83 BPM | OXYGEN SATURATION: 98 % | WEIGHT: 133 LBS | BODY MASS INDEX: 23.57 KG/M2 | TEMPERATURE: 97.8 F | SYSTOLIC BLOOD PRESSURE: 102 MMHG

## 2022-06-28 DIAGNOSIS — E03.9 HYPOTHYROIDISM, UNSPECIFIED TYPE: Primary | Chronic | ICD-10-CM

## 2022-06-28 DIAGNOSIS — Z51.81 THERAPEUTIC DRUG MONITORING: ICD-10-CM

## 2022-06-28 DIAGNOSIS — F41.8 OTHER SPECIFIED ANXIETY DISORDERS: Chronic | ICD-10-CM

## 2022-06-28 PROCEDURE — 99214 OFFICE O/P EST MOD 30 MIN: CPT | Performed by: INTERNAL MEDICINE

## 2022-06-28 PROCEDURE — 90677 PCV20 VACCINE IM: CPT | Performed by: INTERNAL MEDICINE

## 2022-06-28 PROCEDURE — G0009 ADMIN PNEUMOCOCCAL VACCINE: HCPCS | Performed by: INTERNAL MEDICINE

## 2022-06-28 NOTE — PROGRESS NOTES
"    I N T E R N A L  M E D I C I N E  J U N O H  K I M,  M D      ENCOUNTER DATE:  06/28/2022    Gwendolyn Huddleston / 66 y.o. / female      CHIEF COMPLAINT / REASON FOR OFFICE VISIT     Hypothyroidism      ASSESSMENT & PLAN     Problem List Items Addressed This Visit        High    Hypothyroidism - Primary (Chronic)    Overview     *Name brand Synthroid only    Continue Synthroid 100 mcg           Relevant Medications    levothyroxine (Synthroid) 100 MCG tablet    Other Relevant Orders    TSH+Free T4       Medium    Other specified anxiety disorders (Chronic)    Overview     Has been on lorazepam taken as needed for episodic anxiety           Relevant Medications    LORazepam (ATIVAN) 0.5 MG tablet    Other Relevant Orders    Compliance Drug Analysis, Ur - Urine, Clean Catch      Other Visit Diagnoses     Therapeutic drug monitoring        Relevant Orders    Compliance Drug Analysis, Ur - Urine, Clean Catch        Orders Placed This Encounter   Procedures   • Pneumococcal Conjugate Vaccine 20-Valent All   • Compliance Drug Analysis, Ur - Urine, Clean Catch   • TSH+Free T4     No orders of the defined types were placed in this encounter.      SUMMARY/DISCUSSION  •       Next Appointment with me: Visit date not found    Return in about 5 months (around 11/28/2022) for AWV.      VITAL SIGNS     Vitals:    06/28/22 0852   BP: 102/70   BP Location: Left arm   Pulse: 83   Temp: 97.8 °F (36.6 °C)   SpO2: 98%   Weight: 60.3 kg (133 lb)   Height: 160 cm (62.99\")       BP Readings from Last 3 Encounters:   06/28/22 102/70   12/07/21 124/80   08/16/21 126/80     Wt Readings from Last 3 Encounters:   06/28/22 60.3 kg (133 lb)   12/07/21 61.7 kg (136 lb)   08/16/21 60.8 kg (134 lb)     Body mass index is 23.57 kg/m².    Blood pressure readings recorded on patient flowsheet:  No flowsheet data found.       MEDICATIONS AT THE TIME OF OFFICE VISIT     Current Outpatient Medications on File Prior to Visit   Medication Sig   • " acetaminophen (TYLENOL) 500 MG tablet Take 1 tablet by mouth Every 8 (Eight) Hours As Needed for Mild Pain  or Moderate Pain .   • levothyroxine (Synthroid) 100 MCG tablet Take 1 tablet by mouth Daily.   • LORazepam (ATIVAN) 0.5 MG tablet Take 1 tablet by mouth Daily As Needed for Anxiety.     No current facility-administered medications on file prior to visit.          HISTORY OF PRESENT ILLNESS     Clinically stable on Synthroid 100 mcg for hypothyroidism.  Takes lorazepam sparingly as needed for situational anxiety.  Currently she is building a new house which has been somewhat stressful.  No depression.      Patient Care Team:  Darrian Hallman MD as PCP - General (Internal Medicine)  Mariam Machado MD as Consulting Physician (Dermatology)  Niranjan Ramos MD as Consulting Physician (Obstetrics and Gynecology)    REVIEW OF SYSTEMS     Review of Systems       PHYSICAL EXAMINATION     Physical Exam  General: No acute distress  Psych: Normal thought and judgment   Cardiovascular Rate: normal. Rhythm: regular. Heart sounds: normal.   Pulmonary/Chest: Effort normal and breath sounds normal.        REVIEWED DATA     Labs:     Lab Results   Component Value Date     12/07/2021    K 4.0 12/07/2021    CALCIUM 9.2 12/07/2021    AST 24 12/07/2021    ALT 21 12/07/2021    BUN 15 12/07/2021    CREATININE 0.67 12/07/2021    CREATININE 0.67 09/01/2020    CREATININE 0.73 01/29/2020    EGFRIFNONA 92 12/07/2021    EGFRIFAFRI 106 12/07/2021       Lab Results   Component Value Date    HGBA1C 5.46 01/21/2019       Lab Results   Component Value Date     (H) 12/07/2021     (H) 09/01/2020     (H) 01/21/2019    HDL 77 12/07/2021    HDL 65 (H) 09/01/2020    TRIG 103 12/07/2021    TRIG 110 09/01/2020       Lab Results   Component Value Date    TSH 1.430 02/08/2022    TSH 5.280 (H) 12/07/2021    TSH 2.860 07/12/2021    FREET4 1.49 02/08/2022    FREET4 1.22 12/07/2021    FREET4 1.16 07/12/2021       Lab Results    Component Value Date    WBC 7.7 12/07/2021    HGB 13.7 12/07/2021     12/07/2021       No results found for: MALBCRERATIO       Imaging:           Medical Tests:           Summary of old records / correspondence / consultant report:           Request outside records:             *Examiner was wearing KN95 mask and eye protection during the entire duration of the visit. Patient was masked the entire time. Minimum social distance of 6 ft maintained entire visit except if physical contact was necessary as documented.       Template created by Ingrid Hallman MD

## 2022-07-05 LAB
DRUGS UR: NORMAL
T4 FREE SERPL-MCNC: 1.44 NG/DL (ref 0.82–1.77)
TSH SERPL DL<=0.005 MIU/L-ACNC: 0.76 UIU/ML (ref 0.45–4.5)

## 2022-07-11 DIAGNOSIS — F41.8 OTHER SPECIFIED ANXIETY DISORDERS: Chronic | ICD-10-CM

## 2022-07-12 RX ORDER — LORAZEPAM 0.5 MG/1
TABLET ORAL
Qty: 30 TABLET | Refills: 2 | Status: SHIPPED | OUTPATIENT
Start: 2022-07-12 | End: 2023-04-06

## 2022-10-12 ENCOUNTER — FLU SHOT (OUTPATIENT)
Dept: INTERNAL MEDICINE | Age: 67
End: 2022-10-12

## 2022-10-12 DIAGNOSIS — Z23 NEED FOR INFLUENZA VACCINATION: Primary | ICD-10-CM

## 2022-10-12 PROCEDURE — 90662 IIV NO PRSV INCREASED AG IM: CPT | Performed by: INTERNAL MEDICINE

## 2022-10-12 PROCEDURE — G0008 ADMIN INFLUENZA VIRUS VAC: HCPCS | Performed by: INTERNAL MEDICINE

## 2022-11-30 ENCOUNTER — OFFICE VISIT (OUTPATIENT)
Dept: INTERNAL MEDICINE | Age: 67
End: 2022-11-30

## 2022-11-30 VITALS
TEMPERATURE: 98.1 F | DIASTOLIC BLOOD PRESSURE: 80 MMHG | HEIGHT: 63 IN | WEIGHT: 136 LBS | OXYGEN SATURATION: 98 % | HEART RATE: 72 BPM | SYSTOLIC BLOOD PRESSURE: 104 MMHG | BODY MASS INDEX: 24.1 KG/M2

## 2022-11-30 DIAGNOSIS — H93.13 TINNITUS OF BOTH EARS: ICD-10-CM

## 2022-11-30 DIAGNOSIS — F41.8 OTHER SPECIFIED ANXIETY DISORDERS: Chronic | ICD-10-CM

## 2022-11-30 DIAGNOSIS — Z78.0 POSTMENOPAUSAL STATE: ICD-10-CM

## 2022-11-30 DIAGNOSIS — Z00.00 MEDICARE ANNUAL WELLNESS VISIT, INITIAL: Primary | ICD-10-CM

## 2022-11-30 DIAGNOSIS — Z13.820 ENCOUNTER FOR SCREENING FOR OSTEOPOROSIS: ICD-10-CM

## 2022-11-30 DIAGNOSIS — E78.5 HYPERLIPIDEMIA, UNSPECIFIED HYPERLIPIDEMIA TYPE: ICD-10-CM

## 2022-11-30 DIAGNOSIS — E03.9 HYPOTHYROIDISM, UNSPECIFIED TYPE: Chronic | ICD-10-CM

## 2022-11-30 PROCEDURE — 1170F FXNL STATUS ASSESSED: CPT | Performed by: INTERNAL MEDICINE

## 2022-11-30 PROCEDURE — G0438 PPPS, INITIAL VISIT: HCPCS | Performed by: INTERNAL MEDICINE

## 2022-11-30 PROCEDURE — 1159F MED LIST DOCD IN RCRD: CPT | Performed by: INTERNAL MEDICINE

## 2022-11-30 PROCEDURE — 0124A PR ADM SARSCOV2 30MCG/0.3ML BST: CPT | Performed by: INTERNAL MEDICINE

## 2022-11-30 PROCEDURE — 91312 COVID-19 (PFIZER) BIVALENT BOOSTER 12+YRS: CPT | Performed by: INTERNAL MEDICINE

## 2022-11-30 NOTE — PROGRESS NOTES
I N T E R N A L  M E D I C I N E    J U N O H  K I M,  M D      ENCOUNTER DATE:  11/30/2022    Gwendolyn Huddleston / 67 y.o. / female    MEDICARE ANNUAL WELLNESS VISIT       Chief Complaint: Medicare Wellness-Initial Visit       Patient's general assessment of her health since a year ago:     - Compared to one year ago, she feels her physical health is:   [x] About the same  [] Better  [] Worse  []     - Compared to one year ago, she feels her mental health is   [x] About the same  [] Better  [] Worse  []     HPI for other active medical problems:     Complains of tinnitus and hearing loss. Has not had hearing tested. Planned cataract surgery.       HISTORY       Recent Hospitalizations:    Recent hospitalization?:     If YES, location, date, and diagnoses:     · Location:   · Date:   · Principle Discharge Dx:   · Secondary Dx:       Patient Care Team:    Patient Care Team:  Darrian Hallman MD as PCP - General (Internal Medicine)  Mariam Machado MD as Consulting Physician (Dermatology)  Niranjan Ramos MD as Consulting Physician (Obstetrics and Gynecology)      Allergies:  Penicillins    Medications:  Current Outpatient Medications on File Prior to Visit   Medication Sig Dispense Refill   • levothyroxine (Synthroid) 100 MCG tablet Take 1 tablet by mouth Daily. 90 tablet 3   • acetaminophen (TYLENOL) 500 MG tablet Take 1 tablet by mouth Every 8 (Eight) Hours As Needed for Mild Pain  or Moderate Pain .     • LORazepam (ATIVAN) 0.5 MG tablet TAKE ONE TABLET BY MOUTH DAILY AS NEEDED FOR ANXIETY 30 tablet 2     No current facility-administered medications on file prior to visit.        PFSH:     The following portions of the patient's history were reviewed and updated as appropriate: Allergies / Current Medications / Past Medical History / Surgical History / Social History / Family History    Problem List:  Patient Active Problem List   Diagnosis   • Hypothyroidism   • Other specified anxiety disorders   • Functional  "diarrhea   • Rib pain on right side   • Tinnitus of both ears   • Hyperlipidemia       Past Medical History:  Past Medical History:   Diagnosis Date   • Anxiety    • Arthritis    • Colon abnormality     STATED SHE HAD A \"KINK\" IN COLON, UNABLE TO PROCEED WITH LAST COLONOSCOPY..  DID AN EGD    • Dysphagia    • History of shingles 06/2017    on right breast   • Hypothyroidism    • Melanoma (HCC) 2011    upper chest, followed by Dr. Mariam Eldridge    • Postpartum hemorrhage    • Sigmoid diverticulosis    • Surgical complication     excessive bleeding during surgical procedures       Past Surgical History:  Past Surgical History:   Procedure Laterality Date   • ANAL SPHINCTEROPLASTY N/A 1984   • CHOLECYSTECTOMY WITH INTRAOPERATIVE CHOLANGIOGRAM N/A 6/20/2017    Procedure: LAPAROSCOPIC CHOLECYSTECTOMY WITH INTRAOPERATIVE CHOLANGIOGRAM;  Surgeon: Jessica Cannon MD;  Location: [facility] MAIN OR;  Service:    • COLONOSCOPY N/A 2010    normal colonoscopy, done at [facility]   • COLONOSCOPY N/A 3/28/2019    Procedure: COLONOSCOPY TO CECUM;  Surgeon: Jessica Cannon MD;  Location: [facility] ENDOSCOPY;  Service: General   • ENDOSCOPY     • FOOT CLOSED REDUCTION Left    • [place] ERCP DX COLLECTION SPECIMEN BRUSHING/WASHING N/A 6/21/2017    Procedure: ENDOSCOPIC RETROGRADE CHOLANGIOPANCREATOGRAPHY with sphinterotomy and ballon duct sweep;  Surgeon: Pastor Stanton MD;  Location: [facility] ENDOSCOPY;  Service: Gastroenterology   • SKIN CANCER EXCISION N/A 2011    Melanoma of the upper chest excision   • TOTAL LAPAROSCOPIC HYSTERECTOMY SALPINGO OOPHORECTOMY Bilateral 1999    Dr. Ramos   • US GUIDED CYST ASPIRATION BREAST N/A 1/19/2022   • VARICOSE VEIN SURGERY Bilateral    • VEIN LIGATION AND STRIPPING Right 2015   • WISDOM TOOTH EXTRACTION N/A        Social History:  Social History     Socioeconomic History   • Marital status:      Spouse name: Richard*   • Number of children: 2   Tobacco Use   • Smoking status: Never   • Smokeless " "tobacco: Never   Vaping Use   • Vaping Use: Never used   Substance and Sexual Activity   • Alcohol use: Yes     Comment: Social   • Drug use: No   • Sexual activity: Yes     Partners: Male       Family History:  Family History   Problem Relation Age of Onset   • Arthritis Mother    • Hypertension Mother    • Osteoporosis Mother    • Bleeding Disorder Mother    • Depression Mother    • Coronary artery disease Mother    • Atrial fibrillation Mother    • Polymyalgia rheumatica Mother    • Colon cancer Mother    • Prostate cancer Father    • Liver cancer Father    • Diabetes Father    • Glaucoma Father    • Colon polyps Father    • Thyroid disease Sister    • Heart disease Paternal Grandfather    • Malig Hyperthermia Neg Hx          PATIENT ASSESSMENT     Vitals:  Vitals:    11/30/22 1322   BP: 104/80   Pulse: 72   Temp: 98.1 °F (36.7 °C)   SpO2: 98%   Weight: 61.7 kg (136 lb)   Height: 160 cm (62.99\")       BP Readings from Last 3 Encounters:   11/30/22 104/80   06/28/22 102/70   12/07/21 124/80     Wt Readings from Last 3 Encounters:   11/30/22 61.7 kg (136 lb)   06/28/22 60.3 kg (133 lb)   12/07/21 61.7 kg (136 lb)      Body mass index is 24.1 kg/m².    Blood pressure readings recorded on patient flowsheet:  No flowsheet data found.         Review of Systems:    Review of Systems  Constitutional neg except per HPI   Resp neg  CV neg   GI functional diarrhea ; no change   negative     Physical Exam:    Physical Exam  General: No acute distress  Psych: Normal thought and judgment   Cardiovascular Rate: normal. Rhythm: regular. Heart sounds: normal.   Pulm/Chest: Effort normal, breath sounds normal.  No carotid bruit.      Reviewed Data:    Labs:   Lab Results   Component Value Date     12/07/2021    K 4.0 12/07/2021    CALCIUM 9.2 12/07/2021    AST 24 12/07/2021    ALT 21 12/07/2021    BUN 15 12/07/2021    CREATININE 0.67 12/07/2021    CREATININE 0.67 09/01/2020    CREATININE 0.73 01/29/2020    EGFRIFNONA 92 " 12/07/2021    EGFRIFAFRI 106 12/07/2021       Lab Results   Component Value Date    HGBA1C 5.46 01/21/2019       Lab Results   Component Value Date     (H) 12/07/2021     (H) 09/01/2020     (H) 01/21/2019    HDL 77 12/07/2021    TRIG 103 12/07/2021    CHOLHDLRATIO 2.9 12/07/2021       Lab Results   Component Value Date    TSH 0.756 06/28/2022    FREET4 1.44 06/28/2022          Lab Results   Component Value Date    WBC 7.7 12/07/2021    HGB 13.7 12/07/2021    HGB 13.9 09/01/2020    HGB 13.3 01/21/2019     12/07/2021                 No results found for: PSA    Imaging:          Medical Tests:          Screening for Glaucoma:  Previous screening for glaucoma?:   [x] YES  [] NO  []     Hearing Loss Screen:  Finger Rub Hearing Test (right ear):   [] PASSED  [] FAILED  [x] BORDERLINE  [] HAS HEARING AID  [] USING HEARING AID  [] NOT USING HEARING AID  []     Finger Rub Hearing Test (left ear):   [] PASSED  [] FAILED  [x] BORDERLINE  [] HAS HEARING AID  [] USING HEARING AID  [] NOT USING HEARING AID  []     Urinary Incontinence Screen:  Episodes of urinary incontinence? :  [] YES  [x] NO  [] N/A  [] WILL NEED FOLLOWUP  []       Depression Screen:  PHQ-2/PHQ-9 Depression Screening 11/30/2022   Retired Total Score -   Little Interest or Pleasure in Doing Things 0-->not at all   Feeling Down, Depressed or Hopeless 0-->not at all   PHQ-9: Brief Depression Severity Measure Score 0        PHQ-2:   [x] 0 -      NOT DEPRESSED  [] 1 -      NOT DEPRESSED  [] 2 -      NOT DEPRESSED  [] 3-6 -  DEPRESSED (PROCEED TO PHQ-9)  [] N/A - HAS DEPRESSION AND IS ON TREATMENT  [] N/A - HAS DEPRESSION AND IS NOT ON TREATMENT    PHQ-9:   [x] 0         NEGATIVE SCREENING FOR DEPRESSION  [] 1-4      MINIMAL DEPRESSION  [] 5-9      MILD DEPRESSIONNOT DEPRESSED  [] 10-14  MODERATE DEPRESSION  [] 15-19  MODERATELY SEVERE DEPRESSION  [] 20-27  SEVERE DEPRESSION    FUNCTIONAL, FALL RISK, & COGNITIVE SCREENING (Components  below):    DATA:    Functional & Cognitive Status 11/30/2022   Do you have difficulty preparing food and eating? No   Do you have difficulty bathing yourself, getting dressed or grooming yourself? No   Do you have difficulty using the toilet? No   Do you have difficulty moving around from place to place? No   Do you have trouble with steps or getting out of a bed or a chair? No   Current Diet Well Balanced Diet   Dental Exam Up to date   Eye Exam Up to date   Exercise (times per week) 5 times per week   Current Exercises Include Walking   Do you need help using the phone?  No   Are you deaf or do you have serious difficulty hearing?  Yes   Do you need help with transportation? No   Do you need help shopping? No   Do you need help preparing meals?  No   Do you need help with housework?  No   Do you need help with laundry? No   Do you need help taking your medications? No   Do you need help managing money? No   Do you ever drive or ride in a car without wearing a seat belt? No   Do you have difficulty concentrating, remembering or making decisions? No         A) Assessment of Functional Ability:  (Assessment of ability to perform ADL's (showering/bathing, using toilet, dressing, feeding self, moving self around) and IADL's (use telephone, shop, prepare food, housekeep, do laundry, transport independently, take medications independently, and handle finances)    Degree of functional impairment: (based on assessment noted above)  [] NONE  [x] MILD  [] MODERATE  [] SEVERE  [] VERY SEVERE  []     B) Assessment of Fall Risk:  [x] LOW  [] MODERATE  [] HIGH  [] VERY HIGH     Need for further evaluation of gait, strength, and balance? :  [] YES  [x] NO    Timed Up and Go (TUG):   (>= 12 seconds indicates high risk for falling)    Observable abnormalities included:  [x] NORMAL GAIT   [] SLOW CAUTIOUS PACE  [] IMPAIRED BALANCE  [] SHORT STRIDES  [] MINIMAL ARM SWING  [] UNSTEADY (MILD)  [] UNSTEADY (MODERATE)  [] UNSTEADY  (SEVERE)  [] SHUFFLING GAIT  [] USES ASSISTIVE DEVICE (CANE) PROPERLY  [] USES ASSISTIVE DEVICE (WALKER) PROPERLY  [] USES ASSISTIVE DEVICE (CANE) PROPERLY  [] DOES NOT USE ASSISTIVE DEVICE PROPERLY  [] IN WHEELCHAIR   [] NOT ABLE TO BE TESTED DUE TO SAFETY CONCERNS  []     C. Assessment of Cognitive Function:    Mini-Cog Test:     1) Registration (3 objects):     [x] YES  [] NO   [] N/A HAS DOCUMENTED DEMENTIA     2) Number of objects recalled:   [x] 3  [] 2  [] 1  [] 0     3) Clock Draw: Passed? :   [x] YES  [] NO   [] N/A  [] N/A HAS DOCUMENTED DEMENTIA     Further evaluation required? :   [] YES  [x] NO   [x] CLOSE OBSERVATION NEEDED   [] SCHEDULE APPOINTMENT TO EVALUATE FURTHER   [] CONTINUE REGULAR FOLLOWUP AND MANAGEMENT     COUNSELING       A. Identification of Health Risk Factors:    Risk factors include: cardiovascular risk factors and poor hearing      B. Age-Appropriate Screening Schedule:  (Refer to the list below for future screening recommendations based on patient's age, sex and/or medical conditions. Orders for these recommended tests are listed in the plan section. The patient has been provided with a written plan)    Health Maintenance Topics  Health Maintenance   Topic Date Due   • DXA SCAN  11/03/2022   • ZOSTER VACCINE (2 of 2) 01/02/2023 (Originally 10/27/2020)   • LIPID PANEL  12/07/2022   • MAMMOGRAM  01/03/2024   • TDAP/TD VACCINES (3 - Td or Tdap) 01/28/2029   • INFLUENZA VACCINE  Completed   • PAP SMEAR  Discontinued       Health Maintenance Topics Due or Over-Due  Health Maintenance Due   Topic Date Due   • COVID-19 Vaccine (4 - Booster for Pfizer series) 01/25/2022   • DXA SCAN  11/03/2022         C. Advanced Care Planning:    Advance Care Planning   ACP discussion was held with the patient during this visit. Patient has an advance directive (not in EMR), copy requested.       D. Patient Self-Management and Personalized Health Advice:    She has been provided with personalized  counseling/information (including brochures/handouts) about:     -- recommended hearing testing and reducing risk for cardiovascular disease (heart, stroke, vascular)      She has been recommended for the following preventative services which has been performed today, will be ordered today or ordered/performed on upcoming follow-up visit:     -- NUTRITION counseling provided, EXERCISE counseling provided, CARDIOVASCULAR disease risk reduction counseling performed, FALL RISK assessment / plan of care completed, URINARY incontinence assessment done, OSTEOPOROSIS screening (DEXA ordered), BREAST CANCER screening DISCUSSED, **COLORECTAL cancer screening (colonoscopy/Cologuard discussed or ordered), vaccination for SHINGRIX administered  (or recommended), COVID-19 vaccination (and/or booster) recommendations provided      E. Miscellaneous Items:    -Aspirin use counseling: Does not need ASA (and currently is not on it)    -Discussed BMI with her. The BMI is in the acceptable range    -Reviewed use of high risk medication in the elderly: YES    -Reviewed for potential of harmful drug interactions in the elderly: YES        WRAP UP       Assessment & Plan:    1) MEDICARE ANNUAL WELLNESS VISIT    2) OTHER MEDICAL CONDITIONS ADDRESSED TODAY:            Problem List Items Addressed This Visit        High    Hypothyroidism (Chronic)    Overview     *Name brand Synthroid only    Continue Synthroid 100 mcg         Relevant Medications    levothyroxine (Synthroid) 100 MCG tablet    Other Relevant Orders    TSH+Free T4       Medium    Other specified anxiety disorders (Chronic)    Overview     Has been on lorazepam taken as needed for episodic anxiety         Relevant Medications    LORazepam (ATIVAN) 0.5 MG tablet    Hyperlipidemia (Chronic)    Overview     Maintain low saturated fat/cholesterol diet.           Relevant Orders    Comprehensive Metabolic Panel    Lipid Panel With / Chol / HDL Ratio       Low    Tinnitus of both  ears    Current Assessment & Plan     Referral to ENT for tinnitus and hearing loss.         Other Visit Diagnoses     Medicare annual wellness visit, initial    -  Primary    Encounter for screening for osteoporosis        Relevant Orders    DEXA Bone Density Axial    Postmenopausal state        Relevant Orders    DEXA Bone Density Axial                    Orders Placed This Encounter   Procedures   • DEXA Bone Density Axial   • COVID-19 Bivalent Booster (Pfizer) 12+yrs   • Comprehensive Metabolic Panel   • Lipid Panel With / Chol / HDL Ratio   • TSH+Free T4   • Ambulatory Referral to ENT (Otolaryngology)       Discussion / Summary:        Medications as of TODAY:              Current Outpatient Medications   Medication Sig Dispense Refill   • levothyroxine (Synthroid) 100 MCG tablet Take 1 tablet by mouth Daily. 90 tablet 3   • acetaminophen (TYLENOL) 500 MG tablet Take 1 tablet by mouth Every 8 (Eight) Hours As Needed for Mild Pain  or Moderate Pain .     • LORazepam (ATIVAN) 0.5 MG tablet TAKE ONE TABLET BY MOUTH DAILY AS NEEDED FOR ANXIETY 30 tablet 2     No current facility-administered medications for this visit.         FOLLOW-UP:            Return in about 6 months (around 5/30/2023) for Reassess chronic medical problems.               No future appointments.        After Visit Summary (AVS) including the Personalized Prevention  Plan Services (PPPS) was either printed and given to the patient at check-out today and/or sent to [facility] for review.         *Examiner was wearing KN95 mask and eye protection during the entire duration of the visit. Patient was masked the entire time. Minimum social distance of 6 ft maintained entire visit except if physical contact was necessary as documented.       Template created by Ingrid Hallamn MD

## 2022-12-01 LAB
ALBUMIN SERPL-MCNC: 4.4 G/DL (ref 3.5–5.2)
ALBUMIN/GLOB SERPL: 2.1 G/DL
ALP SERPL-CCNC: 52 U/L (ref 39–117)
ALT SERPL-CCNC: 19 U/L (ref 1–33)
AST SERPL-CCNC: 22 U/L (ref 1–32)
BILIRUB SERPL-MCNC: 0.5 MG/DL (ref 0–1.2)
BUN SERPL-MCNC: 14 MG/DL (ref 8–23)
BUN/CREAT SERPL: 23 (ref 7–25)
CALCIUM SERPL-MCNC: 9.1 MG/DL (ref 8.6–10.5)
CHLORIDE SERPL-SCNC: 105 MMOL/L (ref 98–107)
CHOLEST SERPL-MCNC: 218 MG/DL (ref 0–200)
CHOLEST/HDLC SERPL: 2.99 {RATIO}
CO2 SERPL-SCNC: 26.9 MMOL/L (ref 22–29)
CREAT SERPL-MCNC: 0.61 MG/DL (ref 0.57–1)
EGFRCR SERPLBLD CKD-EPI 2021: 98.1 ML/MIN/1.73
GLOBULIN SER CALC-MCNC: 2.1 GM/DL
GLUCOSE SERPL-MCNC: 83 MG/DL (ref 65–99)
HDLC SERPL-MCNC: 73 MG/DL (ref 40–60)
LDLC SERPL CALC-MCNC: 123 MG/DL (ref 0–100)
POTASSIUM SERPL-SCNC: 4 MMOL/L (ref 3.5–5.2)
PROT SERPL-MCNC: 6.5 G/DL (ref 6–8.5)
SODIUM SERPL-SCNC: 139 MMOL/L (ref 136–145)
T4 FREE SERPL-MCNC: 1.35 NG/DL (ref 0.93–1.7)
TRIGL SERPL-MCNC: 126 MG/DL (ref 0–150)
TSH SERPL DL<=0.005 MIU/L-ACNC: 1.23 UIU/ML (ref 0.27–4.2)
VLDLC SERPL CALC-MCNC: 22 MG/DL (ref 5–40)

## 2023-01-01 DIAGNOSIS — E03.9 HYPOTHYROIDISM, UNSPECIFIED TYPE: ICD-10-CM

## 2023-01-03 RX ORDER — LEVOTHYROXINE SODIUM 0.1 MG/1
TABLET ORAL
Qty: 90 TABLET | Refills: 3 | Status: SHIPPED | OUTPATIENT
Start: 2023-01-03

## 2023-04-06 DIAGNOSIS — F41.8 OTHER SPECIFIED ANXIETY DISORDERS: Chronic | ICD-10-CM

## 2023-04-06 RX ORDER — LORAZEPAM 0.5 MG/1
TABLET ORAL
Qty: 30 TABLET | Refills: 0 | Status: SHIPPED | OUTPATIENT
Start: 2023-04-06

## 2023-10-09 DIAGNOSIS — F41.8 OTHER SPECIFIED ANXIETY DISORDERS: Chronic | ICD-10-CM

## 2023-10-11 RX ORDER — LORAZEPAM 0.5 MG/1
0.5 TABLET ORAL DAILY PRN
Qty: 30 TABLET | Refills: 2 | Status: SHIPPED | OUTPATIENT
Start: 2023-10-11

## 2023-12-18 ENCOUNTER — OFFICE VISIT (OUTPATIENT)
Dept: INTERNAL MEDICINE | Age: 68
End: 2023-12-18
Payer: MEDICARE

## 2023-12-18 VITALS
TEMPERATURE: 97.7 F | WEIGHT: 135 LBS | SYSTOLIC BLOOD PRESSURE: 120 MMHG | OXYGEN SATURATION: 97 % | BODY MASS INDEX: 23.92 KG/M2 | HEIGHT: 63 IN | DIASTOLIC BLOOD PRESSURE: 84 MMHG | HEART RATE: 78 BPM

## 2023-12-18 DIAGNOSIS — F41.8 OTHER SPECIFIED ANXIETY DISORDERS: Chronic | ICD-10-CM

## 2023-12-18 DIAGNOSIS — Z13.6 ENCOUNTER FOR SCREENING FOR VASCULAR DISEASE: ICD-10-CM

## 2023-12-18 DIAGNOSIS — E03.9 HYPOTHYROIDISM, UNSPECIFIED TYPE: Chronic | ICD-10-CM

## 2023-12-18 DIAGNOSIS — Z00.00 MEDICARE ANNUAL WELLNESS VISIT, SUBSEQUENT: Primary | ICD-10-CM

## 2023-12-18 DIAGNOSIS — Z51.81 THERAPEUTIC DRUG MONITORING: ICD-10-CM

## 2023-12-18 DIAGNOSIS — E78.5 HYPERLIPIDEMIA, UNSPECIFIED HYPERLIPIDEMIA TYPE: Chronic | ICD-10-CM

## 2023-12-18 NOTE — PROGRESS NOTES
I N T E R N A L  M E D I C I N E    J U N O H  K I M,  M D      ENCOUNTER DATE:  12/18/2023    Gwendolyn Huddleston / 68 y.o. / female    MEDICARE ANNUAL WELLNESS VISIT       Chief Complaint:    Chief Complaint   Patient presents with    Medicare Wellness-subsequent     11/30/22       Patient's general assessment of her health since a year ago:     - Compared to one year ago, she feels her physical health is:   [x] About the same  [] Better  [] Little worse  [] Significantly worse  []     - Compared to one year ago, she feels her mental health is   [x] About the same  [] Better  [] Little worse  [] Significantly worse  []     HPI for other active medical problems:     She is also here to follow-up on active medical problems requiring laboratory evaluation and/or medical management.      Blood pressure and weight are stable today.     She remains on levothyroxine 100 mcg for hypothyroidism.    On lorazepam as needed for anxiety without issues.    Patient complains of tinnitus that worsens intermittently and hearing loss. She does not wear hearing aids. She saw an ENT approximately 2 years ago.    She reports intermittent diarrhea that is triggered with certain food.     A chest CT in 2019 showed no pulmonary airspace consolidation, no suspicious nodules. She has a small umbilical hernia that is not bothersome.     Patient reports she recently underwent bilateral cataract surgery.     Denies depression or major cognitive changes. No tobacco use and drinks occasionally. Denies recent falls. DEXA scan in 2020 and revealed osteopenia of the spine and hips. 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 She reports occasional stress incontinence.       HISTORY       Recent Hospitalizations:    Recent hospitalization?:     If YES, location, date, and diagnoses:     Location:   Date:   Principle Discharge Dx:   Secondary Dx:       Patient Care Team:    Patient Care Team:  Darrian Hallman MD as PCP - General  "(Internal Medicine)  Mariam Machado MD as Consulting Physician (Dermatology)  Niranjan Ramos MD as Consulting Physician (Obstetrics and Gynecology)      Allergies:  Penicillins    Medications:  Current Outpatient Medications on File Prior to Visit   Medication Sig Dispense Refill    acetaminophen (TYLENOL) 500 MG tablet Take 1 tablet by mouth Every 8 (Eight) Hours As Needed for Mild Pain  or Moderate Pain .      Influenza Vac High-Dose Quad (Fluzone High-Dose Quadrivalent) 0.7 ML suspension prefilled syringe injection Inject  into the appropriate muscle as directed by prescriber. 0.7 mL 0    levothyroxine (SYNTHROID, LEVOTHROID) 100 MCG tablet TAKE 1 TABLET DAILY 90 tablet 3    LORazepam (ATIVAN) 0.5 MG tablet TAKE 1 TABLET BY MOUTH DAILY AS NEEDED FOR ANXIETY 30 tablet 2     No current facility-administered medications on file prior to visit.        PFSH:     The following portions of the patient's history were reviewed and updated as appropriate: Allergies / Current Medications / Past Medical History / Surgical History / Social History / Family History    Problem List:  Patient Active Problem List   Diagnosis    Hypothyroidism    Other specified anxiety disorders    Functional diarrhea    Rib pain on right side    Tinnitus of both ears    Hyperlipidemia       Past Medical History:  Past Medical History:   Diagnosis Date    Anxiety     Arthritis     Cataract 02/23    Colon abnormality     STATED SHE HAD A \"KINK\" IN COLON, UNABLE TO PROCEED WITH LAST COLONOSCOPY..  DID AN EGD     Dysphagia     History of shingles 06/2017    on right breast    Hypothyroidism     Melanoma 2011    upper chest, followed by Dr. Mariam Eldridge     Postpartum hemorrhage     Sigmoid diverticulosis     Surgical complication     excessive bleeding during surgical procedures       Past Surgical History:  Past Surgical History:   Procedure Laterality Date    ANAL SPHINCTEROPLASTY N/A 1984    CHOLECYSTECTOMY WITH INTRAOPERATIVE CHOLANGIOGRAM " N/A 6/20/2017    Procedure: LAPAROSCOPIC CHOLECYSTECTOMY WITH INTRAOPERATIVE CHOLANGIOGRAM;  Surgeon: Jessica Cannon MD;  Location: [facility] MAIN OR;  Service:     COLONOSCOPY N/A 2010    normal colonoscopy, done at [facility]    COLONOSCOPY N/A 3/28/2019    Procedure: COLONOSCOPY TO CECUM;  Surgeon: Jessica Cannon MD;  Location: [facility] ENDOSCOPY;  Service: General    ENDOSCOPY      FOOT CLOSED REDUCTION Left     ME ERCP DX COLLECTION SPECIMEN BRUSHING/WASHING N/A 6/21/2017    Procedure: ENDOSCOPIC RETROGRADE CHOLANGIOPANCREATOGRAPHY with sphinterotomy and ballon duct sweep;  Surgeon: Pastor Stanton MD;  Location: [facility] ENDOSCOPY;  Service: Gastroenterology    SKIN CANCER EXCISION N/A 2011    Melanoma of the upper chest excision    TOTAL LAPAROSCOPIC HYSTERECTOMY SALPINGO OOPHORECTOMY Bilateral 1999    Dr. Ramos    US GUIDED CYST ASPIRATION BREAST N/A 1/19/2022    VARICOSE VEIN SURGERY Bilateral     VEIN LIGATION AND STRIPPING Right 2015    WISDOM TOOTH EXTRACTION N/A        Social History:  Social History     Socioeconomic History    Marital status:      Spouse name: Richard*    Number of children: 2   Tobacco Use    Smoking status: Never    Smokeless tobacco: Never   Vaping Use    Vaping Use: Never used   Substance and Sexual Activity    Alcohol use: Not Currently     Comment: Seldom    Drug use: No    Sexual activity: Yes     Partners: Male     Birth control/protection: Hysterectomy       Family History:  Family History   Problem Relation Age of Onset    Arthritis Mother     Hypertension Mother     Osteoporosis Mother     Bleeding Disorder Mother     Depression Mother     Coronary artery disease Mother     Atrial fibrillation Mother     Polymyalgia rheumatica Mother     Colon cancer Mother     Prostate cancer Father     Liver cancer Father     Diabetes Father     Glaucoma Father     Colon polyps Father     Thyroid disease Sister     No Known Problems Sister     No Known Problems Sister     Heart disease  "Paternal Grandfather     No Known Problems Daughter     No Known Problems Daughter     Malig Hyperthermia Neg Hx          PATIENT ASSESSMENT     Vitals:  Vitals:    12/18/23 1252   BP: 120/84   Pulse: 78   Temp: 97.7 °F (36.5 °C)   SpO2: 97%   Weight: 61.2 kg (135 lb)   Height: 160 cm (62.99\")       BP Readings from Last 3 Encounters:   12/18/23 120/84   06/28/23 130/82   11/30/22 104/80     Wt Readings from Last 3 Encounters:   12/18/23 61.2 kg (135 lb)   06/28/23 61.3 kg (135 lb 3.2 oz)   11/30/22 61.7 kg (136 lb)      Body mass index is 23.92 kg/m².    Blood pressure readings recorded on patient flowsheet:       No data to display                    Review of Systems:    Review of Systems  Constitutional neg except per HPI   Resp neg  CV neg   GI intermittent diarrhea   negative   Neuro negative   Psych negative   ENT tinnitus, decreased hearing     Physical Exam:    Physical Exam  General: No acute distress  Psych: Normal thought and judgment   Cardiovascular Rate: normal. Rhythm: regular. Heart sounds: normal   Pulm/Chest: Effort normal, breath sounds normal.  No carotid bruit     Reviewed Data:    Labs:   Lab Results   Component Value Date     11/30/2022    K 4.0 11/30/2022    CALCIUM 9.1 11/30/2022    AST 22 11/30/2022    ALT 19 11/30/2022    BUN 14 11/30/2022    CREATININE 0.61 11/30/2022    CREATININE 0.67 12/07/2021    CREATININE 0.67 09/01/2020    EGFRIFNONA 92 12/07/2021    EGFRIFAFRI 106 12/07/2021       Lab Results   Component Value Date    HGBA1C 5.46 01/21/2019       Lab Results   Component Value Date     (H) 11/30/2022     (H) 12/07/2021     (H) 09/01/2020    HDL 73 (H) 11/30/2022    TRIG 126 11/30/2022    CHOLHDLRATIO 2.99 11/30/2022       Lab Results   Component Value Date    TSH 1.430 06/28/2023    FREET4 1.50 06/28/2023          Lab Results   Component Value Date    WBC 7.7 12/07/2021    HGB 13.7 12/07/2021    HGB 13.9 09/01/2020    HGB 13.3 01/21/2019     " "12/07/2021                 No results found for: \"PSA\"    Imaging:            Medical Tests:            Summary of old records / correspondence / consultant report:           Request outside records:         Screening for Glaucoma:  Previous screening for glaucoma?:   [] YES  [] NO  []     Hearing Loss Screen:  Finger Rub Hearing Test (right ear):   [] PASSED  [] FAILED  [] BORDERLINE  [] HAS HEARING AID  [] USING HEARING AID  [] NOT USING HEARING AID  []     Finger Rub Hearing Test (left ear):   [] PASSED  [] FAILED  [] BORDERLINE  [] HAS HEARING AID  [] USING HEARING AID  [] NOT USING HEARING AID  []     Urinary Incontinence Screen:  Episodes of urinary incontinence? :  [] YES  [] NO  [] N/A  [] WILL NEED FOLLOWUP  []       Depression Screen:      12/18/2023    12:55 PM   PHQ-2/PHQ-9 Depression Screening   Little Interest or Pleasure in Doing Things 0-->not at all   Feeling Down, Depressed or Hopeless 0-->not at all   PHQ-9: Brief Depression Severity Measure Score 0        PHQ-2:   [] 0 -      NOT DEPRESSED  [] 1 -      NOT DEPRESSED  [] 2 -      NOT DEPRESSED  [] 3-6 -  DEPRESSED (PROCEED TO PHQ-9)  [] N/A - HAS DEPRESSION AND IS ON TREATMENT  [] N/A - HAS DEPRESSION AND IS NOT ON TREATMENT    PHQ-9:   [] 0         NEGATIVE SCREENING FOR DEPRESSION  [] 1-4      MINIMAL DEPRESSION  [] 5-9      MILD DEPRESSIONNOT DEPRESSED  [] 10-14  MODERATE DEPRESSION  [] 15-19  MODERATELY SEVERE DEPRESSION  [] 20-27  SEVERE DEPRESSION    FUNCTIONAL, FALL RISK, & COGNITIVE SCREENING (Components below):    DATA:        12/18/2023    12:56 PM   Functional & Cognitive Status   Do you have difficulty preparing food and eating? No   Do you have difficulty bathing yourself, getting dressed or grooming yourself? No   Do you have difficulty using the toilet? No   Do you have difficulty moving around from place to place? No   Do you have trouble with steps or getting out of a bed or a chair? No   Current Diet Well Balanced Diet   Dental " Exam Up to date   Eye Exam Up to date   Exercise (times per week) 3 times per week   Current Exercises Include Walking   Do you need help using the phone?  No   Are you deaf or do you have serious difficulty hearing?  Yes   Do you need help to go to places out of walking distance? No   Do you need help shopping? No   Do you need help preparing meals?  No   Do you need help with housework?  No   Do you need help with laundry? No   Do you need help taking your medications? No   Do you need help managing money? No   Do you ever drive or ride in a car without wearing a seat belt? No   Who do you live with? Spouse   Do you have difficulty concentrating, remembering or making decisions? No         A) Assessment of Functional Ability:  (Assessment of ability to perform ADL's (showering/bathing, using toilet, dressing, feeding self, moving self around) and IADL's (use telephone, shop, prepare food, housekeep, do laundry, transport independently, take medications independently, and handle finances)    Degree of functional impairment: (based on assessment noted above)  [] NONE  [] MILD  [] MODERATE  [] SEVERE  [] VERY SEVERE  []     B) Assessment of Fall Risk:  [] LOW  [] MODERATE  [] HIGH  [] VERY HIGH     Need for further evaluation of gait, strength, and balance? :  [] YES  [] NO    Timed Up and Go (TUG):   (>= 12 seconds indicates high risk for falling)    Observable abnormalities included:  [] NORMAL GAIT   [] SLOW CAUTIOUS PACE  [] IMPAIRED BALANCE  [] SHORT STRIDES  [] MINIMAL ARM SWING  [] UNSTEADY (MILD)  [] UNSTEADY (MODERATE)  [] UNSTEADY (SEVERE)  [] SHUFFLING GAIT  [] USES ASSISTIVE DEVICE (CANE) PROPERLY  [] USES ASSISTIVE DEVICE (WALKER) PROPERLY  [] USES ASSISTIVE DEVICE (CANE) PROPERLY  [] DOES NOT USE ASSISTIVE DEVICE PROPERLY  [] IN WHEELCHAIR   [] NOT ABLE TO BE TESTED DUE TO SAFETY CONCERNS  []     C. Assessment of Cognitive Function:    Mini-Cog Test:     1) Registration (3 objects):     [] YES  [] NO    [] N/A HAS DOCUMENTED DEMENTIA     2) Number of objects recalled:   [] 3  [] 2  [] 1  [] 0     3) Clock Draw: Passed? :   [] YES  [] NO   [] N/A  [] N/A HAS DOCUMENTED DEMENTIA           Further evaluation required? :   [] YES  [] NO   [] CLOSE OBSERVATION NEEDED   [] SCHEDULE APPOINTMENT TO EVALUATE FURTHER   [] CONTINUE REGULAR FOLLOWUP AND MANAGEMENT   []     COUNSELING       A. Identification of Health Risk Factors:    Risk factors include: cardiovascular risk factors and poor hearing      B. Age-Appropriate Screening Schedule:  (Refer to the list below for future screening recommendations based on patient's age, sex and/or medical conditions. Orders for these recommended tests are listed in the plan section. The patient has been provided with a written plan)    Health Maintenance Topics  Health Maintenance   Topic Date Due    ZOSTER VACCINE (2 of 2) 10/27/2020    DXA SCAN  11/03/2022    COVID-19 Vaccine (5 - 2023-24 season) 09/01/2023    LIPID PANEL  11/30/2023    MAMMOGRAM  01/03/2024    COLORECTAL CANCER SCREENING  03/28/2024    ANNUAL WELLNESS VISIT  12/18/2024    TDAP/TD VACCINES (3 - Td or Tdap) 01/28/2029    HEPATITIS C SCREENING  Completed    INFLUENZA VACCINE  Completed    Pneumococcal Vaccine 65+  Completed    PAP SMEAR  Discontinued       Health Maintenance Topics Due or Over-Due  Health Maintenance Due   Topic Date Due    ZOSTER VACCINE (2 of 2) 10/27/2020    DXA SCAN  11/03/2022    COVID-19 Vaccine (5 - 2023-24 season) 09/01/2023    LIPID PANEL  11/30/2023         C. Advanced Care Planning:    Advance Care Planning   ACP discussion was held with the patient during this visit. Patient has an advance directive (not in EMR), copy requested.       D. Patient Self-Management and Personalized Health Advice:    She has been provided with personalized counseling/information (including brochures/handouts) about:     -- reducing risk for cardiovascular disease (heart, stroke, vascular) and fall  prevention      She has been recommended for the following preventative services which has been performed today, will be ordered today or ordered/performed on upcoming follow-up visit:     -- NUTRITION counseling provided, CARDIOVASCULAR disease risk reduction counseling performed, FALL RISK assessment / plan of care completed, URINARY incontinence assessment done, VASCULAR screening recommended (including AAA screening), OSTEOPOROSIS screening DISCUSSED, BREAST CANCER screening DISCUSSED, **COLORECTAL cancer screening (colonoscopy/Cologuard discussed or ordered), vaccination for SHINGRIX administered/recommended/discussed, COVID-19 vaccination (and/or booster) administered/recommended/discussed, RSV vaccination administered/recommended/discussed, DIABETES screening performed (current/reviewed labs/lab ordered)      E. Miscellaneous Items: [id number]    -Aspirin use counseling: Does not need ASA (and currently is not on it)    -Discussed BMI with her. The BMI is in the acceptable range    -Reviewed use of high risk medication in the elderly: YES    -Reviewed for potential of harmful drug interactions in the elderly: YES        WRAP UP       Assessment & Plan:    1) MEDICARE ANNUAL WELLNESS VISIT    2) OTHER MEDICAL CONDITIONS ADDRESSED TODAY:            Problem List Items Addressed This Visit          High    Hypothyroidism (Chronic)    Overview     *Name brand Synthroid only    Continue Synthroid 100 mcg         Relevant Medications    levothyroxine (SYNTHROID, LEVOTHROID) 100 MCG tablet    Other Relevant Orders    TSH+Free T4    CBC & Differential    Urinalysis With Culture If Indicated - Urine, Clean Catch       Medium    Other specified anxiety disorders (Chronic)    Overview     Has been on lorazepam taken as needed for episodic anxiety         Current Assessment & Plan     Continue lorazepam 0.5 mg as needed for anxiety.     Reviewed latest TERESA . Reviewed most recent UDS or ordered UDS. Patient is having  medication refilled at expected intervals. Using medication appropriately without evidence of unusual increased use, abuse, or diverting.          Relevant Medications    LORazepam (ATIVAN) 0.5 MG tablet    Hyperlipidemia (Chronic)    Overview     Maintain low saturated fat/cholesterol diet.           Current Assessment & Plan     Lab Results   Component Value Date     (H) 11/30/2022     (H) 12/07/2021     (H) 09/01/2020    TRIG 126 11/30/2022    CHOLHDLRATIO 2.99 11/30/2022   Maintain low saturated fat/cholesterol diet.   Check lab today.          Relevant Orders    Comprehensive Metabolic Panel    Lipid Panel With / Chol / HDL Ratio     Other Visit Diagnoses       Medicare annual wellness visit, subsequent    -  Primary    Therapeutic drug monitoring        Relevant Orders    Compliance Drug Analysis, Ur - Urine, Clean Catch    Encounter for screening for vascular disease        Relevant Orders    Vascular Screening (Bundle) CAR                      Orders Placed This Encounter   Procedures    Compliance Drug Analysis, Ur - Urine, Clean Catch    Comprehensive Metabolic Panel    Lipid Panel With / Chol / HDL Ratio    TSH+Free T4    Urinalysis With Culture If Indicated - Urine, Clean Catch    CBC & Differential       Discussion / Summary:        Medications as of TODAY:              Current Outpatient Medications   Medication Sig Dispense Refill    acetaminophen (TYLENOL) 500 MG tablet Take 1 tablet by mouth Every 8 (Eight) Hours As Needed for Mild Pain  or Moderate Pain .      Influenza Vac High-Dose Quad (Fluzone High-Dose Quadrivalent) 0.7 ML suspension prefilled syringe injection Inject  into the appropriate muscle as directed by prescriber. 0.7 mL 0    levothyroxine (SYNTHROID, LEVOTHROID) 100 MCG tablet TAKE 1 TABLET DAILY 90 tablet 3    LORazepam (ATIVAN) 0.5 MG tablet TAKE 1 TABLET BY MOUTH DAILY AS NEEDED FOR ANXIETY 30 tablet 2     No current facility-administered medications for this  visit.         FOLLOW-UP:            Return in about 6 months (around 6/18/2024) for Reassess chronic medical problems, **SCHEDULE COMB AWV/MED FU FOR NEXT YR (30 MIN)**.              Future Appointments   Date Time Provider Department Center   5/22/2024  8:00 AM [facility]   6/18/2024 10:45 AM Darrian Hallman MD [facility]  [facility]           After Visit Summary (AVS) including the Personalized Prevention  Plan Services (PPPS) was either printed and given to the patient at check-out today and/or sent to [facility] for review.           Transcribed from ambient dictation for Darrian Hallman MD by Edith Maxwell.  12/18/23   14:07 EST    Patient or patient representative verbalized consent to the visit recording.  I have personally performed the services described in this document as transcribed by the above individual, and it is both accurate and complete.  Darrian Hallman MD  12/18/2023  17:08 EST

## 2023-12-18 NOTE — ASSESSMENT & PLAN NOTE
Continue lorazepam 0.5 mg as needed for anxiety.     Reviewed latest TERESA . Reviewed most recent UDS or ordered UDS. Patient is having medication refilled at expected intervals. Using medication appropriately without evidence of unusual increased use, abuse, or diverting.

## 2023-12-18 NOTE — ASSESSMENT & PLAN NOTE
Lab Results   Component Value Date     (H) 11/30/2022     (H) 12/07/2021     (H) 09/01/2020    TRIG 126 11/30/2022    CHOLHDLRATIO 2.99 11/30/2022     Maintain low saturated fat/cholesterol diet.   Check lab today.

## 2023-12-19 LAB
ALBUMIN SERPL-MCNC: 4.4 G/DL (ref 3.9–4.9)
ALBUMIN/GLOB SERPL: 2 {RATIO} (ref 1.2–2.2)
ALP SERPL-CCNC: 57 IU/L (ref 44–121)
ALT SERPL-CCNC: 17 IU/L (ref 0–32)
APPEARANCE UR: CLEAR
AST SERPL-CCNC: 21 IU/L (ref 0–40)
BACTERIA #/AREA URNS HPF: NORMAL /[HPF]
BASOPHILS # BLD AUTO: 0.1 X10E3/UL (ref 0–0.2)
BASOPHILS NFR BLD AUTO: 1 %
BILIRUB SERPL-MCNC: 0.6 MG/DL (ref 0–1.2)
BILIRUB UR QL STRIP: NEGATIVE
BUN SERPL-MCNC: 13 MG/DL (ref 8–27)
BUN/CREAT SERPL: 21 (ref 12–28)
CALCIUM SERPL-MCNC: 9.3 MG/DL (ref 8.7–10.3)
CASTS URNS QL MICRO: NORMAL /LPF
CHLORIDE SERPL-SCNC: 102 MMOL/L (ref 96–106)
CHOLEST SERPL-MCNC: 205 MG/DL (ref 100–199)
CHOLEST/HDLC SERPL: 2.9 RATIO (ref 0–4.4)
CO2 SERPL-SCNC: 24 MMOL/L (ref 20–29)
COLOR UR: YELLOW
CREAT SERPL-MCNC: 0.63 MG/DL (ref 0.57–1)
EGFRCR SERPLBLD CKD-EPI 2021: 97 ML/MIN/1.73
EOSINOPHIL # BLD AUTO: 0.3 X10E3/UL (ref 0–0.4)
EOSINOPHIL NFR BLD AUTO: 4 %
EPI CELLS #/AREA URNS HPF: NORMAL /HPF (ref 0–10)
ERYTHROCYTE [DISTWIDTH] IN BLOOD BY AUTOMATED COUNT: 12.2 % (ref 11.7–15.4)
GLOBULIN SER CALC-MCNC: 2.2 G/DL (ref 1.5–4.5)
GLUCOSE SERPL-MCNC: 87 MG/DL (ref 70–99)
GLUCOSE UR QL STRIP: NEGATIVE
HCT VFR BLD AUTO: 39.3 % (ref 34–46.6)
HDLC SERPL-MCNC: 71 MG/DL
HGB BLD-MCNC: 13.5 G/DL (ref 11.1–15.9)
HGB UR QL STRIP: NEGATIVE
IMM GRANULOCYTES # BLD AUTO: 0 X10E3/UL (ref 0–0.1)
IMM GRANULOCYTES NFR BLD AUTO: 0 %
KETONES UR QL STRIP: NEGATIVE
LDLC SERPL CALC-MCNC: 115 MG/DL (ref 0–99)
LEUKOCYTE ESTERASE UR QL STRIP: NEGATIVE
LYMPHOCYTES # BLD AUTO: 2.6 X10E3/UL (ref 0.7–3.1)
LYMPHOCYTES NFR BLD AUTO: 39 %
MCH RBC QN AUTO: 31.4 PG (ref 26.6–33)
MCHC RBC AUTO-ENTMCNC: 34.4 G/DL (ref 31.5–35.7)
MCV RBC AUTO: 91 FL (ref 79–97)
MICRO URNS: NORMAL
MICRO URNS: NORMAL
MONOCYTES # BLD AUTO: 0.5 X10E3/UL (ref 0.1–0.9)
MONOCYTES NFR BLD AUTO: 7 %
NEUTROPHILS # BLD AUTO: 3.3 X10E3/UL (ref 1.4–7)
NEUTROPHILS NFR BLD AUTO: 49 %
NITRITE UR QL STRIP: NEGATIVE
PH UR STRIP: 6.5 [PH] (ref 5–7.5)
PLATELET # BLD AUTO: 225 X10E3/UL (ref 150–450)
POTASSIUM SERPL-SCNC: 3.9 MMOL/L (ref 3.5–5.2)
PROT SERPL-MCNC: 6.6 G/DL (ref 6–8.5)
PROT UR QL STRIP: NEGATIVE
RBC # BLD AUTO: 4.3 X10E6/UL (ref 3.77–5.28)
RBC #/AREA URNS HPF: NORMAL /HPF (ref 0–2)
SODIUM SERPL-SCNC: 139 MMOL/L (ref 134–144)
SP GR UR STRIP: 1.01 (ref 1–1.03)
T4 FREE SERPL-MCNC: 1.67 NG/DL (ref 0.82–1.77)
TRIGL SERPL-MCNC: 106 MG/DL (ref 0–149)
TSH SERPL DL<=0.005 MIU/L-ACNC: 0.79 UIU/ML (ref 0.45–4.5)
URINALYSIS REFLEX: NORMAL
UROBILINOGEN UR STRIP-MCNC: 0.2 MG/DL (ref 0.2–1)
VLDLC SERPL CALC-MCNC: 19 MG/DL (ref 5–40)
WBC # BLD AUTO: 6.7 X10E3/UL (ref 3.4–10.8)
WBC #/AREA URNS HPF: NORMAL /HPF (ref 0–5)

## 2023-12-27 LAB — DRUGS UR: NORMAL

## 2024-01-02 ENCOUNTER — HOSPITAL ENCOUNTER (OUTPATIENT)
Dept: CARDIOLOGY | Facility: HOSPITAL | Age: 69
Discharge: HOME | End: 2024-01-02
Admitting: INTERNAL MEDICINE

## 2024-01-02 VITALS
HEIGHT: 62 IN | DIASTOLIC BLOOD PRESSURE: 77 MMHG | BODY MASS INDEX: 24.84 KG/M2 | HEART RATE: 68 BPM | SYSTOLIC BLOOD PRESSURE: 117 MMHG | WEIGHT: 135 LBS

## 2024-01-02 DIAGNOSIS — Z13.6 ENCOUNTER FOR SCREENING FOR VASCULAR DISEASE: ICD-10-CM

## 2024-01-02 LAB
BH CV ECHO MEAS - DIST AO DIAM: 1.46 CM
BH CV VAS BP LEFT ARM: NORMAL MMHG
BH CV VAS BP RIGHT ARM: NORMAL MMHG
BH CV VAS SCREENING CAROTID CCA LEFT: NORMAL CM/SEC
BH CV VAS SCREENING CAROTID CCA RIGHT: NORMAL CM/SEC
BH CV VAS SCREENING CAROTID ICA LEFT: NORMAL CM/SEC
BH CV VAS SCREENING CAROTID ICA RIGHT: NORMAL CM/SEC
BH CV XLRA MEAS - MID AO DIAM: 1.68 CM
BH CV XLRA MEAS - PAD LEFT ABI DP: 1.19
BH CV XLRA MEAS - PAD LEFT ABI PT: 1.25
BH CV XLRA MEAS - PAD LEFT ARM: 114 MMHG
BH CV XLRA MEAS - PAD LEFT LEG DP: 140 MMHG
BH CV XLRA MEAS - PAD LEFT LEG PT: 146 MMHG
BH CV XLRA MEAS - PAD RIGHT ABI DP: 1.18
BH CV XLRA MEAS - PAD RIGHT ABI PT: 1.24
BH CV XLRA MEAS - PAD RIGHT ARM: 117 MMHG
BH CV XLRA MEAS - PAD RIGHT LEG DP: 138 MMHG
BH CV XLRA MEAS - PAD RIGHT LEG PT: 145 MMHG
BH CV XLRA MEAS - PROX AO DIAM: 1.96 CM
BH CV XLRA MEAS LEFT ICA/CCA RATIO: 1.03
BH CV XLRA MEAS LEFT PROX ECA PSV: NORMAL CM/SEC
BH CV XLRA MEAS RIGHT ICA/CCA RATIO: 1.5
BH CV XLRA MEAS RIGHT PROX ECA PSV: NORMAL CM/SEC
MAXIMAL PREDICTED HEART RATE: 152 BPM
STRESS TARGET HR: 129 BPM

## 2024-01-02 PROCEDURE — 93799 UNLISTED CV SVC/PROCEDURE: CPT

## 2024-01-05 NOTE — PROGRESS NOTES
MyChart:    Here are the result(s) of your test(s):     Triple vascular screening was negative for significant blood vessel blockage or abdominal aortic aneurysm.        Please do not hesitate to contact me if you have questions.

## 2024-01-29 NOTE — TELEPHONE ENCOUNTER
Appt scheduled to discuss medications   Viji it appears that she has a cyst in her right breast and they would like to aspirate that, for further diagnostic insight and to remove the cyst.  I have already placed that order and patient might already be aware of that.  They should be reaching out to schedule that today.

## 2024-01-30 DIAGNOSIS — E03.9 HYPOTHYROIDISM, UNSPECIFIED TYPE: ICD-10-CM

## 2024-01-30 RX ORDER — LEVOTHYROXINE SODIUM 0.1 MG/1
100 TABLET ORAL DAILY
Qty: 90 TABLET | Refills: 1 | Status: SHIPPED | OUTPATIENT
Start: 2024-01-30

## 2024-01-30 NOTE — TELEPHONE ENCOUNTER
Caller: Yunieralis Gwendolyn    Relationship: Self    Best call back number: [phone]     Requested Prescriptions:   Requested Prescriptions     Pending Prescriptions Disp Refills    levothyroxine (SYNTHROID, LEVOTHROID) 100 MCG tablet 90 tablet 3     Sig: Take 1 tablet by mouth Daily.        Pharmacy where request should be sent: [facility] [address] [phone] [facility] [phone] [facility]     Last office visit with prescribing clinician: 12/18/2023   Last telemedicine visit with prescribing clinician: Visit date not found   Next office visit with prescribing clinician: 6/18/2024     Additional details provided by patient: PATIENT IS REQUESTING A 3 MONTH SUPPLY OF MEDICATION   PATIENT HAS ABOUT A WEEK SUPPLY OF MEDICATION     Does the patient have less than a 3 day supply:  [] Yes  [x] No    Would you like a call back once the refill request has been completed: [] Yes [x] No    If the office needs to give you a call back, can they leave a voicemail: [] Yes [x] No    Nahum Cheung Rep   01/30/24 08:42 EST

## 2024-02-06 ENCOUNTER — PREP FOR SURGERY (OUTPATIENT)
Dept: OTHER | Facility: HOSPITAL | Age: 69
End: 2024-02-06
Payer: MEDICARE

## 2024-02-06 DIAGNOSIS — Z80.0 FAMILY HISTORY OF COLON CANCER IN FATHER: ICD-10-CM

## 2024-02-06 DIAGNOSIS — Z12.11 SCREENING FOR COLON CANCER: Primary | ICD-10-CM

## 2024-02-06 DIAGNOSIS — Z80.0 FAMILY HISTORY OF COLON CANCER IN MOTHER: ICD-10-CM

## 2024-03-10 NOTE — PROGRESS NOTES
"[facility] INTERNAL MEDICINE  SCOTT JHAVERI M.D.      Gwendolyn Lindo Kovats / 62 y.o. / female  03/08/2018      MEDICATIONS  Current Outpatient Prescriptions   Medication Sig Dispense Refill   • levothyroxine (SYNTHROID, LEVOTHROID) 112 MCG tablet Take 1 tablet by mouth Daily. Please take on empty stomach in the AM do not eat or drink for 1 hour 90 tablet 0   • LORazepam (ATIVAN) 0.5 MG tablet Take 1 tablet by mouth Daily As Needed for Anxiety. 30 tablet 2       VITALS    Visit Vitals   • /72   • Pulse 78   • Temp 97.7 °F (36.5 °C)   • Ht 160 cm (62.99\")   • Wt 60.8 kg (134 lb)   • SpO2 95%   • BMI 23.74 kg/m2       BP Readings from Last 3 Encounters:   03/08/18 114/72   12/21/17 112/68   12/13/17 116/76     Wt Readings from Last 3 Encounters:   03/08/18 60.8 kg (134 lb)   12/21/17 60.1 kg (132 lb 6.4 oz)   12/13/17 60.8 kg (134 lb)      Body mass index is 23.74 kg/(m^2).      CC:  Main reason(s) for today's visit: left anterior chest wall pain (2 weeks) and Hypothyroidism      HPI:     History of of melanoma of left anterior chest area diagnosed 10 years ago.   2 weeks left anterior chest wall tenderness/pain, left trapezius/neck pain. No radicular symptoms of LUE.  No discernible recurrent skin changes, palpable lump/mass, had been followed up by derm regularly.   No fever, n/s, weight loss or loss of appetite.     Pain is persistent, moderate, worse with lying on left side, lifting the arm.   Takes ibuprofen as needed.  Has not been improving.     Chronic hypothyroidism:  Previously increased dose of levothyroxine, no changes in physical symptoms, no change in weight, denies palpitation or chest pain, denies increased anxiety or depression  Lab Results   Component Value Date    TSH 3.090 01/24/2018    TSH 8.130 (H) 11/20/2017    FREET4 1.40 01/24/2018    FREET4 1.20 11/20/2017        Patient Care Team:  Darrian Jhaveri MD as PCP - General (Internal Medicine)  Mariam Machado MD as Consulting Physician " (Dermatology)  Niranjan Ramos MD as Consulting Physician (Obstetrics and Gynecology)  ____________________________________________________________________    ASSESSMENT & PLAN:    1. Left-sided chest wall pain  Muscular pain/spasms. Reassurance give. If not improving by 2 weeks, she is to call.     - Naproxen Sodium 220 MG capsule; Take 1-2 capsules twice daily with food as instructed.    2. Muscle spasms of neck  Recommended PT but defers for now  Educational handout given on neck pain  - Naproxen Sodium 220 MG capsule; Take 1-2 capsules twice daily with food as instructed.    3. Hypothyroidism, unspecified type  Check levels. Titrate dose as needed.   - TSH+Free T4       Summary/Discussion:  ·     Return for Next scheduled follow up, Call or return if not improving.    Future Appointments  Date Time Provider Department Center   5/24/2018 8:10 AM [facility] None   5/31/2018 9:00 AM Darrian Hallman MD [facility] None     ____________________________________________________________________    REVIEW OF SYSTEMS    Review of Systems   Constitutional: Negative.    Respiratory: Negative.    Cardiovascular: Positive for chest pain (left anterior chest pain wall pain).   Gastrointestinal: Negative.    Musculoskeletal: Positive for neck pain.   Skin: Negative for color change and rash.   Neurological: Negative.          PHYSICAL EXAMINATION    Physical Exam   Constitutional: No distress.   Pulmonary/Chest: No respiratory distress. She exhibits tenderness. She exhibits no mass, no bony tenderness and no deformity.       Skin:   No anterior chest wall skin lesions, color change         REVIEWED DATA:    Labs:       Imaging:        Medical Tests:        Summary of old records / correspondence / consultant report:        Request outside records:       ALLERGIES  Allergies   Allergen Reactions   • Penicillins Hives        PFSH:     The following portions of the patient's history were reviewed and updated as  appropriate: Allergies / Current Medications / Past Medical History / Surgical History / Social History / Family History    PROBLEM LIST   Patient Active Problem List   Diagnosis   • Hypothyroidism   • Other specified anxiety disorders   • Functional diarrhea       PAST MEDICAL HISTORY  Past Medical History:   Diagnosis Date   • Anxiety    • Arthritis    • History of shingles 06/2017    on right breast   • Hypothyroidism    • Melanoma 2011    upper chest, followed by Dr. Mariam Eldridge    • Postpartum hemorrhage    • Sigmoid diverticulosis    • Surgical complication     excessive bleeding during surgical procedures       SURGICAL HISTORY  Past Surgical History:   Procedure Laterality Date   • ANAL SPHINCTEROPLASTY N/A 1984   • CHOLECYSTECTOMY WITH INTRAOPERATIVE CHOLANGIOGRAM N/A 6/20/2017    Procedure: LAPAROSCOPIC CHOLECYSTECTOMY WITH INTRAOPERATIVE CHOLANGIOGRAM;  Surgeon: Jessica Cannon MD;  Location: [facility] MAIN OR;  Service:    • COLONOSCOPY N/A 2010    normal colonoscopy, done at [facility]   • IA ERCP DX COLLECTION SPECIMEN BRUSHING/WASHING N/A 6/21/2017    Procedure: ENDOSCOPIC RETROGRADE CHOLANGIOPANCREATOGRAPHY with sphinterotomy and ballon duct sweep;  Surgeon: Pastor Stanton MD;  Location: [facility] ENDOSCOPY;  Service: Gastroenterology   • SKIN CANCER EXCISION N/A 2011    Melanoma of the upper chest excision   • TOTAL LAPAROSCOPIC HYSTERECTOMY SALPINGO OOPHORECTOMY Bilateral 1999    Dr. 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)      Social History Main Topics   • Smoking status: Never Smoker   • Smokeless tobacco: Never Used   • Alcohol use 1.8 oz/week     3 Cans of beer per week      Comment: socially    • Drug use: No   • Sexual activity: Yes      Partners: Male       FAMILY HISTORY  Family History   Problem Relation Age of Onset   • Arthritis Mother    • Hypertension Mother    • Osteoporosis Mother    • Bleeding Disorder Mother    • Depression Mother    • Coronary artery disease Mother    • Atrial fibrillation Mother    • Polymyalgia rheumatica Mother    • Colon cancer Mother    • Prostate cancer Father    • Liver cancer Father    • Diabetes Father    • Glaucoma Father    • Colon polyps Father    • Thyroid disease Sister    • Heart disease Paternal Grandfather          **Jorge Disclaimer:   Much of this encounter note is an electronic transcription/translation of spoken language to printed text. The electronic translation of spoken language may permit erroneous, or at times, nonsensical words or phrases to be inadvertently transcribed. Although I have reviewed the note for such errors, some may still exist.    There are no Wet Read(s) to document.

## 2024-03-26 PROBLEM — Z80.0 FAMILY HISTORY OF COLON CANCER IN MOTHER: Status: ACTIVE | Noted: 2024-02-06

## 2024-03-26 PROBLEM — Z80.0 FAMILY HISTORY OF COLON CANCER IN FATHER: Status: ACTIVE | Noted: 2024-02-06

## 2024-03-26 PROBLEM — Z12.11 SCREENING FOR COLON CANCER: Status: ACTIVE | Noted: 2024-02-06

## 2024-05-21 ENCOUNTER — TELEPHONE (OUTPATIENT)
Dept: INTERNAL MEDICINE | Age: 69
End: 2024-05-21
Payer: MEDICARE

## 2024-05-21 DIAGNOSIS — Z13.820 ENCOUNTER FOR SCREENING FOR OSTEOPOROSIS: Primary | ICD-10-CM

## 2024-05-21 DIAGNOSIS — Z78.0 POSTMENOPAUSAL STATE: ICD-10-CM

## 2024-05-21 NOTE — TELEPHONE ENCOUNTER
JOSEFA IS NEEDING NEW REFERRAL ORDER FOR THE DEXA SCAN SENT OVER FOR PATIENT MEGHAN MCMAHONKELLEN APPOINTMENT TOMORROW ON 05/22.  PLEASE ADVISE

## 2024-06-06 ENCOUNTER — ANESTHESIA EVENT (OUTPATIENT)
Dept: GASTROENTEROLOGY | Facility: HOSPITAL | Age: 69
End: 2024-06-06
Payer: MEDICARE

## 2024-06-06 ENCOUNTER — HOSPITAL ENCOUNTER (OUTPATIENT)
Facility: HOSPITAL | Age: 69
Setting detail: HOSPITAL OUTPATIENT SURGERY
Discharge: HOME | End: 2024-06-06
Attending: SURGERY | Admitting: SURGERY
Payer: MEDICARE

## 2024-06-06 ENCOUNTER — ANESTHESIA (OUTPATIENT)
Dept: GASTROENTEROLOGY | Facility: HOSPITAL | Age: 69
End: 2024-06-06
Payer: MEDICARE

## 2024-06-06 VITALS
RESPIRATION RATE: 17 BRPM | WEIGHT: 130 LBS | DIASTOLIC BLOOD PRESSURE: 68 MMHG | TEMPERATURE: 98.1 F | OXYGEN SATURATION: 94 % | SYSTOLIC BLOOD PRESSURE: 102 MMHG | HEIGHT: 63 IN | HEART RATE: 66 BPM | BODY MASS INDEX: 23.04 KG/M2

## 2024-06-06 PROBLEM — Z80.0 FAMILY HISTORY OF COLON CANCER IN FATHER: Status: RESOLVED | Noted: 2024-02-06 | Resolved: 2024-06-06

## 2024-06-06 PROCEDURE — G0105 COLORECTAL SCRN; HI RISK IND: HCPCS | Performed by: SURGERY

## 2024-06-06 PROCEDURE — S0260 H&P FOR SURGERY: HCPCS | Performed by: SURGERY

## 2024-06-06 PROCEDURE — 25810000003 LACTATED RINGERS PER 1000 ML: Performed by: SURGERY

## 2024-06-06 PROCEDURE — 25010000002 PROPOFOL 1000 MG/100ML EMULSION

## 2024-06-06 RX ORDER — SODIUM CHLORIDE 0.9 % (FLUSH) 0.9 %
10 SYRINGE (ML) INJECTION AS NEEDED
Status: DISCONTINUED | OUTPATIENT
Start: 2024-06-06 | End: 2024-06-06 | Stop reason: HOSPADM

## 2024-06-06 RX ORDER — PROPOFOL 10 MG/ML
INJECTION, EMULSION INTRAVENOUS CONTINUOUS PRN
Status: DISCONTINUED | OUTPATIENT
Start: 2024-06-06 | End: 2024-06-06 | Stop reason: SURG

## 2024-06-06 RX ORDER — LIDOCAINE HYDROCHLORIDE 10 MG/ML
0.5 INJECTION, SOLUTION INFILTRATION; PERINEURAL ONCE AS NEEDED
Status: DISCONTINUED | OUTPATIENT
Start: 2024-06-06 | End: 2024-06-06 | Stop reason: HOSPADM

## 2024-06-06 RX ORDER — SODIUM CHLORIDE, SODIUM LACTATE, POTASSIUM CHLORIDE, CALCIUM CHLORIDE 600; 310; 30; 20 MG/100ML; MG/100ML; MG/100ML; MG/100ML
1000 INJECTION, SOLUTION INTRAVENOUS CONTINUOUS
Status: DISCONTINUED | OUTPATIENT
Start: 2024-06-06 | End: 2024-06-06 | Stop reason: HOSPADM

## 2024-06-06 RX ORDER — LIDOCAINE HYDROCHLORIDE 20 MG/ML
INJECTION, SOLUTION INFILTRATION; PERINEURAL AS NEEDED
Status: DISCONTINUED | OUTPATIENT
Start: 2024-06-06 | End: 2024-06-06 | Stop reason: SURG

## 2024-06-06 RX ADMIN — PROPOFOL INJECTABLE EMULSION 100 MG: 10 INJECTION, EMULSION INTRAVENOUS at 08:02

## 2024-06-06 RX ADMIN — LIDOCAINE HYDROCHLORIDE 60 MG: 20 INJECTION, SOLUTION INFILTRATION; PERINEURAL at 08:02

## 2024-06-06 RX ADMIN — PROPOFOL INJECTABLE EMULSION 160 MCG/KG/MIN: 10 INJECTION, EMULSION INTRAVENOUS at 08:03

## 2024-06-06 RX ADMIN — SODIUM CHLORIDE, POTASSIUM CHLORIDE, SODIUM LACTATE AND CALCIUM CHLORIDE 1000 ML: 600; 310; 30; 20 INJECTION, SOLUTION INTRAVENOUS at 07:34

## 2024-06-06 NOTE — H&P
General Surgery  History and Physical    CC: Screening for colon cancer, family history of colon cancer    HPI: The patient is a pleasant 68 y.o. year-old lady who presents today for repeat screening colonoscopy.  Her last colonoscopy was performed by me in 2019 and significant only for a tortuous colon, however I was able to reach the cecum and found no abnormalities throughout the colon or rectum. A prior colonoscopy in 2009 by Dr. Lars Rendon was also grossly normal.  She denies any melena or hematochezia. She has a family history of colon cancer in her mother and liver and prostate cancer in her father.    Past Medical History:   Melanoma of the right chest wall  History of shingles  Hypothyroidism  Periprocedural bleeding    Past Surgical History:   Colonoscopy (2019 by me - tortuous colon)  Colonoscopy (2009, Lars Rendon - normal)  Laparoscopic cholecystectomy (2017, by me)  Hysterectomy (1999)  Shave removal of melanoma from right chest wall  Anal sphincteroplasty (1984)  Bilateral lower extremity venous ablation    Medications:   Medications Prior to Admission   Medication Sig Dispense Refill Last Dose    acetaminophen (TYLENOL) 500 MG tablet Take 1 tablet by mouth Every 8 (Eight) Hours As Needed for Mild Pain  or Moderate Pain .   6/4/2024    levothyroxine (SYNTHROID, LEVOTHROID) 100 MCG tablet Take 1 tablet by mouth Daily. 90 tablet 1 6/5/2024    LORazepam (ATIVAN) 0.5 MG tablet TAKE 1 TABLET BY MOUTH DAILY AS NEEDED FOR ANXIETY 30 tablet 2 5/23/2024       Allergies: Penicillins (hives)    Family History: Father with prostate cancer and liver cancer, mother with colon cancer, daughter with bleeding disorder (questionable ITP)    Social History: , nonsmoker, social alcohol use, retired    ROS: A comprehensive review of systems was conducted and significant only for the following positives: Abdominal pain and trouble swallowing  All other systems reviewed and negative    Physical Exam:  Vitals:     06/06/24 0727   BP: 108/66   Pulse: 70   Resp: 16   Temp: 98.1 °F (36.7 °C)   SpO2: 96%   Height 160 cm  Weight: 59 kg  BMI: 23  General: No acute distress, well-nourished & well-developed  HEAD: normocephalic, atraumatic  EYES: normal conjunctiva, sclera anicteric  EARS: grossly normal hearing  NECK: supple, no thyromegaly  CARDIOVASCULAR: regular rate and rhythm  RESPIRATORY: clear to auscultation bilaterally  GASTROINTESTINAL: soft, nontender, non-distended  PSYCHIATRIC: oriented x3, normal mood and affect    ASSESSMENT & PLAN  Mrs. Huddleston is a 68-year-old lady here for repeat screening colonoscopy given a family history of colon cancer in her mother.  She has been counseled on the risks of the procedure to include bleeding, possible colon perforation, and possible missed pathology.  Despite these risks, she has consented to proceed.    Jessica Cannon MD  General and Endoscopic Surgery  [facility]    [address], [address]  [place], [place], [address]  P: [phone]  F: [phone]

## 2024-06-06 NOTE — ANESTHESIA POSTPROCEDURE EVALUATION
Patient: Gwendolyn Huddleston    Procedure Summary       Date: 06/06/24 Room / Location: [facility] ENDOSCOPY 10 / [facility] ENDOSCOPY    Anesthesia Start: 0759 Anesthesia Stop: 0828    Procedure: COLONOSCOPY to cecum Diagnosis:       Screening for colon cancer      Family history of colon cancer in mother      Family history of colon cancer in father      (Screening for colon cancer [Z12.11])      (Family history of colon cancer in mother [Z80.0])      (Family history of colon cancer in father [Z80.0])    Surgeons: Jessica Cannon MD Provider: Leslye Stinson MD    Anesthesia Type: MAC ASA Status: 2            Anesthesia Type: MAC    Vitals  Vitals Value Taken Time   /68 06/06/24 0848   Temp     Pulse 67 06/06/24 0850   Resp 17 06/06/24 0848   SpO2 98 % 06/06/24 0850   Vitals shown include unfiled device data.        Post Anesthesia Care and Evaluation    Patient location during evaluation: bedside  Patient participation: complete - patient participated  Level of consciousness: awake  Pain management: adequate    Airway patency: patent  Anesthetic complications: No anesthetic complications    Cardiovascular status: acceptable  Respiratory status: acceptable  Hydration status: acceptable

## 2024-06-06 NOTE — OP NOTE
Operative Note :  Jessica Cannon MD      Gwendolyn Fayealis  1955    Procedure Date: 06/06/24    Pre-op Diagnosis:  Screening for colon cancer [Z12.11]  Family history of colon cancer in mother [Z80.0]  Family history of colon cancer in father [Z80.0]    Post-Operative Diagnosis:  Normal colon    Procedure:   Flexible colonoscopy to the cecum    Surgeon: Jessica Cannon MD    Assistant: None    Anesthesia:  MAC (monitored anesthetic care)    Estimated Blood Loss: Minimal    Specimens: None    Complications: None    Indications:  The patient is a 68-year-old lady who presents today for repeat screening colonoscopy.  She has a family history of colon cancer in her mother.  She has been counseled on the risks of the procedure to include bleeding, colon perforation, and missed pathology.  Despite these risks, she has consented to proceed.    Findings: Normal colon    Description of procedure:  The patient was brought to the endoscopy suite and shane in the left lateral decubitus position.  Continuous propofol anesthesia was administered.  A surgical timeout was completed.  A digital rectal exam was performed, revealing no abnormalities.  An adult colonoscope was then inserted through the anus and passed under direct visualization to the level of the cecum.  The cecum was identified via the ileocecal valve as well as the appendiceal orifice.  The scope was then slowly withdrawn, examining all circumferential walls of the ascending, transverse, descending, and sigmoid colon.  There were no gross abnormalities throughout the colon or rectum, specifically no evidence for colon polyps, diverticulosis, or internal hemorrhoids during scope retroflexion within the rectum.  The scope was then withdrawn and the colon desufflated.  The patient had a very good bowel prep and was transferred to the recovery area in stable condition.     Recommendations:  Repeat screening colonoscopy in 5 years given family history of colon  cancer in her mother.    Jessica Cannon MD  General, Robotic, and Endoscopic Surgery  [facility]    [address], [address]  [place] [address]  P: [phone]  F: [phone]

## 2024-06-06 NOTE — ANESTHESIA PREPROCEDURE EVALUATION
Anesthesia Evaluation                  Airway   Mallampati: II  TM distance: >3 FB  Neck ROM: full  No difficulty expected  Dental - normal exam     Pulmonary    Cardiovascular     (+) hyperlipidemia      Neuro/Psych  (+) psychiatric history  GI/Hepatic/Renal/Endo    (+) thyroid problem hypothyroidism    Musculoskeletal     Abdominal    Substance History      OB/GYN          Other   arthritis,   history of cancer                Anesthesia Plan    ASA 2     MAC     intravenous induction     Anesthetic plan, risks, benefits, and alternatives have been provided, discussed and informed consent has been obtained with: patient.    CODE STATUS:

## 2024-06-06 NOTE — DISCHARGE INSTRUCTIONS
For the next 24 hours patient needs to be with a responsible adult.    For 24 hours DO NOT drive, operate machinery, appliances, drink alcohol, make important decisions or sign legal documents.    Start with a light or bland diet if you are feeling sick to your stomach otherwise advance to regular diet as tolerated.    Follow recommendations on procedure report if provided by your doctor.    Call Dr Cannon for problems [phone]    Problems may include but not limited to: large amounts of bleeding, trouble breathing, repeated vomiting, severe unrelieved pain, fever or chills.

## 2024-07-16 DIAGNOSIS — E03.9 HYPOTHYROIDISM, UNSPECIFIED TYPE: ICD-10-CM

## 2024-07-16 RX ORDER — LEVOTHYROXINE SODIUM 0.1 MG/1
100 TABLET ORAL DAILY
Qty: 90 TABLET | Refills: 1 | OUTPATIENT
Start: 2024-07-16

## 2024-08-07 ENCOUNTER — TELEPHONE (OUTPATIENT)
Dept: INTERNAL MEDICINE | Age: 69
End: 2024-08-07

## 2024-08-07 NOTE — TELEPHONE ENCOUNTER
Caller: Gwendolyn Huddleston    Relationship: Self    Best call back number: [phone]     What orders are you requesting (i.e. lab or imaging): BLOOD WORK AND UA     In what timeframe would the patient need to come in: ASAP WANTS TO DO IT ON 8/8/24    Where will you receive your lab/imaging services: OFFICE    Additional notes: PLEASE CALL AND SCHEDULE ONCE ORDERS ARE PLACED

## 2024-08-08 ENCOUNTER — OFFICE VISIT (OUTPATIENT)
Dept: INTERNAL MEDICINE | Age: 69
End: 2024-08-08
Payer: MEDICARE

## 2024-08-08 VITALS
OXYGEN SATURATION: 98 % | BODY MASS INDEX: 24.27 KG/M2 | HEIGHT: 63 IN | TEMPERATURE: 97.1 F | SYSTOLIC BLOOD PRESSURE: 100 MMHG | DIASTOLIC BLOOD PRESSURE: 78 MMHG | WEIGHT: 137 LBS | HEART RATE: 62 BPM

## 2024-08-08 DIAGNOSIS — E78.5 HYPERLIPIDEMIA, UNSPECIFIED HYPERLIPIDEMIA TYPE: Chronic | ICD-10-CM

## 2024-08-08 DIAGNOSIS — E03.9 HYPOTHYROIDISM, UNSPECIFIED TYPE: Chronic | ICD-10-CM

## 2024-08-08 DIAGNOSIS — Z00.00 MEDICARE ANNUAL WELLNESS VISIT, SUBSEQUENT: Primary | ICD-10-CM

## 2024-08-08 DIAGNOSIS — F41.8 OTHER SPECIFIED ANXIETY DISORDERS: Chronic | ICD-10-CM

## 2024-08-08 DIAGNOSIS — Z51.81 THERAPEUTIC DRUG MONITORING: ICD-10-CM

## 2024-08-08 DIAGNOSIS — E03.9 HYPOTHYROIDISM, UNSPECIFIED TYPE: Primary | Chronic | ICD-10-CM

## 2024-08-08 PROCEDURE — 99214 OFFICE O/P EST MOD 30 MIN: CPT | Performed by: PHYSICIAN ASSISTANT

## 2024-08-08 PROCEDURE — 1126F AMNT PAIN NOTED NONE PRSNT: CPT | Performed by: PHYSICIAN ASSISTANT

## 2024-08-08 RX ORDER — LORAZEPAM 0.5 MG/1
0.5 TABLET ORAL DAILY PRN
Qty: 30 TABLET | Refills: 2 | Status: SHIPPED | OUTPATIENT
Start: 2024-08-08

## 2024-08-08 RX ORDER — LEVOTHYROXINE SODIUM 0.1 MG/1
100 TABLET ORAL DAILY
Qty: 90 TABLET | Refills: 1 | Status: SHIPPED | OUTPATIENT
Start: 2024-08-08

## 2024-08-08 NOTE — PROGRESS NOTES
"    I N T E R N A L  M E D I C I N E    Haim Johnson PA-C      ENCOUNTER DATE:  08/08/2024    Gwendolyn Huddleston / 68 y.o. / female    CHIEF COMPLAINT / REASON FOR OFFICE VISIT     Hypothyroidism and Anxiety      ASSESSMENT & PLAN   Diagnoses and all orders for this visit:    1. Therapeutic drug monitoring (Primary)  -     Compliance Drug Analysis, Ur - Urine, Clean Catch    2. Hypothyroidism, unspecified type  Overview:  *Name brand Synthroid only    Continue Synthroid 100 mcg    Orders:  -     TSH+Free T4  -     levothyroxine (SYNTHROID, LEVOTHROID) 100 MCG tablet; Take 1 tablet by mouth Daily.  Dispense: 90 tablet; Refill: 1    3. Other specified anxiety disorders  Overview:  Has been on lorazepam taken as needed for episodic anxiety    Orders:  -     LORazepam (ATIVAN) 0.5 MG tablet; Take 1 tablet by mouth Daily As Needed for Anxiety.  Dispense: 30 tablet; Refill: 2         Next Appointment with me:   Return for Scheduled follow-up with Dr. Hallman for AWV on or after 12/19/2024.        VITAL SIGNS     Vitals:    08/08/24 1251   BP: 100/78   Pulse: 62   Temp: 97.1 °F (36.2 °C)   SpO2: 98%   Weight: 62.1 kg (137 lb)   Height: 160 cm (63\")       @BP@  Wt Readings from Last 3 Encounters:   08/08/24 62.1 kg (137 lb)   06/06/24 59 kg (130 lb)   01/02/24 61.2 kg (135 lb)     Body mass index is 24.27 kg/m².      MEDICATIONS AT THE TIME OF OFFICE VISIT     Current Outpatient Medications on File Prior to Visit   Medication Sig    acetaminophen (TYLENOL) 500 MG tablet Take 1 tablet by mouth Every 8 (Eight) Hours As Needed for Mild Pain  or Moderate Pain .    [DISCONTINUED] levothyroxine (SYNTHROID, LEVOTHROID) 100 MCG tablet Take 1 tablet by mouth Daily.    [DISCONTINUED] LORazepam (ATIVAN) 0.5 MG tablet TAKE 1 TABLET BY MOUTH DAILY AS NEEDED FOR ANXIETY     No current facility-administered medications on file prior to visit.          HISTORY OF PRESENT ILLNESS     Pt is a 69y/o wf with a history of Hypothyroidism and " Anxiety, who presents for medication management and refills.     She is prescribed Levothyroxine 100mcg daily. Most recent TSH was 0.788 on 12/18/2023, which was a change from 1.430 on 6/28/2023. Denies sleep disturbances or heart palpitations.     She is prescribed Ativan 0.5mg daily as needed for Anxiety.  She states that the medication is only taken when absolutely needed.  She does not feel the need for a maintenance anxiety med at this time.  Denies any recent panic attacks.     Only other medication she takes Tylenol 250mg nightly for maintenance of arthritic pain.     Concerning hyperlipidemia, patient admits eating pretty much what she likes.  She is choosing to defer lipid panel until her annual exam with Dr. Hallman in December.    Sleeps an average of 8 hours nightly.     Planning to purchase hearing-aids after completely paying off her recent cataract surgery.     Denies all complaints on today.     She will be due for AWV in December of 2024, with annual labs one week before.         REVIEW OF SYSTEMS     Review of Systems   As per HPI.       PHYSICAL EXAMINATION     Physical Exam  Vitals and nursing note reviewed.   Constitutional:       General: She is not in acute distress.     Appearance: Normal appearance. She is not ill-appearing.   Cardiovascular:      Rate and Rhythm: Normal rate and regular rhythm.      Pulses: Normal pulses.      Heart sounds: Normal heart sounds. No murmur heard.  Pulmonary:      Effort: Pulmonary effort is normal.      Breath sounds: Normal breath sounds. No wheezing.   Neurological:      Mental Status: She is alert.   Psychiatric:         Mood and Affect: Mood normal.         Behavior: Behavior normal.             REVIEWED DATA     Labs:           Imaging:           Medical Tests:           Summary of old records / correspondence / consultant report:           Request outside records:   TERESA 8/8/2024:   [id number]

## 2024-08-09 LAB
T4 FREE SERPL-MCNC: 1.53 NG/DL (ref 0.92–1.68)
TSH SERPL DL<=0.005 MIU/L-ACNC: 0.94 UIU/ML (ref 0.27–4.2)

## 2024-08-16 LAB — DRUGS UR: NORMAL

## 2024-08-22 ENCOUNTER — TELEPHONE (OUTPATIENT)
Dept: INTERNAL MEDICINE | Age: 69
End: 2024-08-22
Payer: MEDICARE

## 2024-08-22 DIAGNOSIS — E03.9 HYPOTHYROIDISM, UNSPECIFIED TYPE: Chronic | ICD-10-CM

## 2024-08-22 RX ORDER — LEVOTHYROXINE SODIUM 0.1 MG/1
100 TABLET ORAL DAILY
Qty: 90 TABLET | Refills: 1 | Status: SHIPPED | OUTPATIENT
Start: 2024-08-22

## 2024-08-22 NOTE — TELEPHONE ENCOUNTER
Caller: Gwendolyn Huddleston    Relationship: Self    Best call back number: [phone]     Requested Prescriptions:   Requested Prescriptions     Pending Prescriptions Disp Refills    levothyroxine (SYNTHROID, LEVOTHROID) 100 MCG tablet 90 tablet 1     Sig: Take 1 tablet by mouth Daily.        Pharmacy where request should be sent: [facility] [address] [phone] [facility] [phone]      Last office visit with prescribing clinician: 12/18/2023   Last telemedicine visit with prescribing clinician: Visit date not found   Next office visit with prescribing clinician: 12/30/2024     Additional details provided by patient: NEEDS MEDICATION TO GO TO MAIL ORDER NOT LOCAL PHARMACY     Does the patient have less than a 3 day supply:  [x] Yes  [] No    Would you like a call back once the refill request has been completed: [] Yes [x] No    If the office needs to give you a call back, can they leave a voicemail: [] Yes [x] No    Nahum Gan   08/22/24 08:54 EDT

## 2024-09-24 ENCOUNTER — HOSPITAL ENCOUNTER (OUTPATIENT)
Dept: BONE DENSITY | Facility: HOSPITAL | Age: 69
Discharge: HOME | End: 2024-09-24
Admitting: INTERNAL MEDICINE
Payer: MEDICARE

## 2024-09-24 DIAGNOSIS — Z13.820 ENCOUNTER FOR SCREENING FOR OSTEOPOROSIS: ICD-10-CM

## 2024-09-24 DIAGNOSIS — Z78.0 POSTMENOPAUSAL STATE: ICD-10-CM

## 2024-09-24 PROCEDURE — 77080 DXA BONE DENSITY AXIAL: CPT

## 2024-10-31 ENCOUNTER — FLU SHOT (OUTPATIENT)
Dept: INTERNAL MEDICINE | Age: 69
End: 2024-10-31
Payer: MEDICARE

## 2024-10-31 DIAGNOSIS — Z23 NEED FOR VACCINATION: Primary | ICD-10-CM

## 2024-10-31 PROCEDURE — G0008 ADMIN INFLUENZA VIRUS VAC: HCPCS | Performed by: NURSE PRACTITIONER

## 2024-10-31 PROCEDURE — 90662 IIV NO PRSV INCREASED AG IM: CPT | Performed by: NURSE PRACTITIONER

## 2025-01-13 ENCOUNTER — OFFICE VISIT (OUTPATIENT)
Dept: INTERNAL MEDICINE | Age: 70
End: 2025-01-13
Payer: MEDICARE

## 2025-01-13 VITALS
DIASTOLIC BLOOD PRESSURE: 76 MMHG | HEIGHT: 63 IN | OXYGEN SATURATION: 99 % | TEMPERATURE: 97.4 F | BODY MASS INDEX: 24.8 KG/M2 | SYSTOLIC BLOOD PRESSURE: 128 MMHG | HEART RATE: 71 BPM | WEIGHT: 140 LBS

## 2025-01-13 DIAGNOSIS — F41.8 OTHER SPECIFIED ANXIETY DISORDERS: Chronic | ICD-10-CM

## 2025-01-13 DIAGNOSIS — Z00.00 ENCOUNTER FOR ANNUAL WELLNESS VISIT: Primary | ICD-10-CM

## 2025-01-13 DIAGNOSIS — E78.5 HYPERLIPIDEMIA, UNSPECIFIED HYPERLIPIDEMIA TYPE: Chronic | ICD-10-CM

## 2025-01-13 DIAGNOSIS — Z79.899 HIGH RISK MEDICATION USE: ICD-10-CM

## 2025-01-13 DIAGNOSIS — E03.9 HYPOTHYROIDISM, UNSPECIFIED TYPE: Chronic | ICD-10-CM

## 2025-01-13 PROCEDURE — G0439 PPPS, SUBSEQ VISIT: HCPCS | Performed by: NURSE PRACTITIONER

## 2025-01-13 PROCEDURE — 1170F FXNL STATUS ASSESSED: CPT | Performed by: NURSE PRACTITIONER

## 2025-01-13 PROCEDURE — 1126F AMNT PAIN NOTED NONE PRSNT: CPT | Performed by: NURSE PRACTITIONER

## 2025-01-13 NOTE — PROGRESS NOTES
S K Y L E R    F R Y E ,   D N P ,  A P R N                  I  N  T  E  R  N  A  L    M  E  D  I  C  I  N  E         ENCOUNTER DATE:  01/13/2025    Gwendolyn Huddleston / 69 y.o. / female    MEDICARE ANNUAL WELLNESS VISIT       CHIEF COMPLAINT / REASON FOR OFFICE VISIT     Hypothyroidism, Anxiety, and Medicare Wellness-subsequent    Patient's general assessment of her health since a year ago:     - Compared to one year ago, she feels her physical health is:   STABLE/SAME    - Compared to one year ago, she feels her mental health is:  STABLE/SAME    Recent Hospitalization (within past 365 days) (NO unless indicated)  No    Patient Care Team:    Patient Care Team:  Darrian Hallman MD as PCP - General (Internal Medicine)  Mariam Machado MD as Consulting Physician (Dermatology)  Niranjan Ramos MD as Consulting Physician (Obstetrics and Gynecology)    Allergies:  Penicillins    Medications:  Current Outpatient Medications on File Prior to Visit   Medication Sig Dispense Refill    acetaminophen (TYLENOL) 500 MG tablet Take 1 tablet by mouth Every 8 (Eight) Hours As Needed for Mild Pain  or Moderate Pain .      levothyroxine (SYNTHROID, LEVOTHROID) 100 MCG tablet Take 1 tablet by mouth Daily. 90 tablet 1    LORazepam (ATIVAN) 0.5 MG tablet Take 1 tablet by mouth Daily As Needed for Anxiety. 30 tablet 2     No current facility-administered medications on file prior to visit.        No opioid medication identified on active medication list. I have reviewed chart for other potential  high risk medication/s and harmful drug interactions in the elderly.      No opioid listed on medication list       HPI FOR OTHER ACTIVE MEDICAL PROBLEMS:    Blood pressure and weight are stable today.      She remains on levothyroxine 100 mcg for hypothyroidism. Last TSH 0.942.     On lorazepam as needed for anxiety without issues.     Patient complains of tinnitus that worsens intermittently and hearing loss. She does not wear  "hearing aids. She saw an ENT approximately 2 years ago.     She reports intermittent diarrhea that is triggered with certain food.      A chest CT in 2019 showed no pulmonary airspace consolidation, no suspicious nodules. She has a small umbilical hernia that is not bothersome.      History of cataract surgery.     Stress incontinence at times that is nonbothersome with use of liner only.    Recent DEXA scan September 24, 2024 osteopenia.    Colonoscopy completed June 6, 2024.  Recall in 5 years history    Mammogram October 2023, overdue will call her OB/GYN.    HISTORY     PFSH:     The following portions of the patient's history were reviewed and updated as appropriate: Allergies / Current Medications / Past Medical History / Surgical History / Social History / Family History    Problem List:  Patient Active Problem List   Diagnosis    Hypothyroidism    Other specified anxiety disorders    Functional diarrhea    Rib pain on right side    Tinnitus of both ears    Hyperlipidemia    Screening for colon cancer    Family history of colon cancer in mother       Past Medical History:  Past Medical History:   Diagnosis Date    Anxiety     Arthritis     Colon abnormality     STATED SHE HAD A \"KINK\" IN COLON, UNABLE TO PROCEED WITH LAST COLONOSCOPY..  DID AN EGD     Dysphagia     OCCASIONALLY GETS CHOKED ON FOOD    Family history of colon cancer     History of shingles 06/2017    on right breast    Hypothyroidism     Melanoma 2011    upper chest, followed by Dr. Mariam Eldridge     Postpartum hemorrhage     Sigmoid diverticulosis     Surgical complication     excessive bleeding during surgical procedures       Past Surgical History:  Past Surgical History:   Procedure Laterality Date    ANAL SPHINCTEROPLASTY N/A 1984    CATARACT EXTRACTION WITH INTRAOCULAR LENS IMPLANT      CHOLECYSTECTOMY WITH INTRAOPERATIVE CHOLANGIOGRAM N/A 06/20/2017    Procedure: LAPAROSCOPIC CHOLECYSTECTOMY WITH INTRAOPERATIVE CHOLANGIOGRAM;  Surgeon: " Jessica Cannon MD;  Location: [facility] MAIN OR;  Service:     COLONOSCOPY N/A 2010    normal colonoscopy, done at [facility]    COLONOSCOPY N/A 03/28/2019    Procedure: COLONOSCOPY TO CECUM;  Surgeon: Jessica Cannon MD;  Location: [facility] ENDOSCOPY;  Service: General    COLONOSCOPY N/A 6/6/2024    Procedure: COLONOSCOPY to cecum;  Surgeon: Jessica Cannon MD;  Location: [facility] ENDOSCOPY;  Service: General;  Laterality: N/A;  pre screen fam hx colon ca post wnl    ENDOSCOPY      FOOT CLOSED REDUCTION Left     TX ERCP DX COLLECTION SPECIMEN BRUSHING/WASHING N/A 06/21/2017    Procedure: ENDOSCOPIC RETROGRADE CHOLANGIOPANCREATOGRAPHY with sphinterotomy and ballon duct sweep;  Surgeon: Pastor Stanton MD;  Location: [facility] ENDOSCOPY;  Service: Gastroenterology    SKIN CANCER EXCISION N/A 2011    Melanoma of the upper chest excision    TOTAL LAPAROSCOPIC HYSTERECTOMY SALPINGO OOPHORECTOMY Bilateral 1999    Dr. Ramos    US GUIDED CYST ASPIRATION BREAST N/A 01/19/2022    VARICOSE VEIN SURGERY Bilateral     VEIN LIGATION AND STRIPPING Right 2015    WISDOM TOOTH EXTRACTION N/A        Social History:  Social History     Socioeconomic History    Marital status:      Spouse name: Richard*    Number of children: 2   Tobacco Use    Smoking status: Never    Smokeless tobacco: Never   Vaping Use    Vaping status: Never Used   Substance and Sexual Activity    Alcohol use: Not Currently     Comment: Seldom    Drug use: No    Sexual activity: Yes     Partners: Male     Birth control/protection: Hysterectomy       Family History:  Family History   Problem Relation Age of Onset    Arthritis Mother     Hypertension Mother     Osteoporosis Mother     Bleeding Disorder Mother     Depression Mother     Coronary artery disease Mother     Atrial fibrillation Mother     Polymyalgia rheumatica Mother     Colon cancer Mother     Prostate cancer Father     Liver cancer Father     Diabetes Father     Glaucoma Father     Colon polyps Father  "    Thyroid disease Sister     No Known Problems Sister     No Known Problems Sister     Heart disease Paternal Grandfather     No Known Problems Daughter     No Known Problems Daughter     Malig Hyperthermia Neg Hx          PATIENT ASSESSMENT     Vitals:  Vitals:    01/13/25 1111 01/13/25 1248   BP: 132/98 128/76   Pulse: 71    Temp: 97.4 °F (36.3 °C)    SpO2: 99%    Weight: 63.5 kg (140 lb)    Height: 160 cm (63\")        BP Readings from Last 3 Encounters:   01/13/25 128/76   08/08/24 100/78   06/06/24 102/68     Wt Readings from Last 3 Encounters:   01/13/25 63.5 kg (140 lb)   08/08/24 62.1 kg (137 lb)   06/06/24 59 kg (130 lb)      Body mass index is 24.8 kg/m².     Review of Systems:     Constitutional neg except per HPI   Resp neg  CV neg   Psych stable     Physical Exam:    Physical Exam  Constitutional  No distress  Cardiovascular Rate  normal . Rhythm: regular . Heart sounds:  normal  Pulmonary/Chest  Effort normal. Breath sounds:  normal  Psychiatric  Alert. Judgment and thought content normal. Mood normal      Reviewed Data:    Labs:   Lab Results   Component Value Date     12/18/2023    K 3.9 12/18/2023    CALCIUM 9.3 12/18/2023    AST 21 12/18/2023    ALT 17 12/18/2023    BUN 13 12/18/2023    CREATININE 0.63 12/18/2023    CREATININE 0.61 11/30/2022    CREATININE 0.67 12/07/2021    EGFRIFNONA 92 12/07/2021    EGFRIFAFRI 106 12/07/2021     Lab Results   Component Value Date    HGBA1C 5.46 01/21/2019     Lab Results   Component Value Date     (H) 12/18/2023     (H) 11/30/2022     (H) 12/07/2021    HDL 71 12/18/2023    TRIG 106 12/18/2023    CHOLHDLRATIO 2.9 12/18/2023     Lab Results   Component Value Date    TSH 0.942 08/08/2024    FREET4 1.53 08/08/2024     Lab Results   Component Value Date    WBC 6.7 12/18/2023    HGB 13.5 12/18/2023    HGB 13.7 12/07/2021    HGB 13.9 09/01/2020     12/18/2023   No results found for: \"PSA\"    Imaging:                Medical Tests: "                Summary of old records / correspondence / consultant report:               Request outside records:             SCREENING ASSESSMENT      Screening for Glaucoma:  Previous screening for glaucoma?: Yes    Hearing Loss Screen:  Finger Rub Hearing Test (right ear): Passed  Finger Rub Hearing Test (left ear): Passed    Urinary Incontinence Screen:  Episodes of urinary incontinence? :   NO    Depression Screen:    PHQ-2 Depression Screening  Little interest or pleasure in doing things? Not at all   Feeling down, depressed, or hopeless? Not at all   PHQ-2 Total Score 0     PHQ-2: 0 (Not depressed)    PHQ-9: 0 (Negative screening for depression)     FUNCTIONAL, FALL RISK, & COGNITIVE SCREENING     A) Functional and cognitive status based on patient responses:        1/13/2025    11:09 AM   Functional & Cognitive Status   Do you have difficulty preparing food and eating? No   Do you have difficulty bathing yourself, getting dressed or grooming yourself? No   Do you have difficulty using the toilet? No   Do you have difficulty moving around from place to place? No   Do you have trouble with steps or getting out of a bed or a chair? No   Current Diet Well Balanced Diet   Dental Exam Up to date   Eye Exam Up to date   Exercise (times per week) 3 times per week   Current Exercises Include Walking   Do you need help using the phone?  No   Are you deaf or do you have serious difficulty hearing?  No   Do you need help to go to places out of walking distance? No   Do you need help shopping? No   Do you need help preparing meals?  No   Do you need help with housework?  No   Do you need help with laundry? No   Do you need help taking your medications? No   Do you need help managing money? No   Do you ever drive or ride in a car without wearing a seat belt? No   Have you felt unusual stress, anger or loneliness in the last month? No   Who do you live with? Spouse   If you need help, do you have trouble finding someone  available to you? No   Have you been bothered in the last four weeks by sexual problems? No   Do you have difficulty concentrating, remembering or making decisions? No       B) Assessment of Fall Risk:    Fall Risk Assessment was completed, and patient is at LOW (AVERAGE) RISK risk for falls.    Need for further evaluation of gait, strength, and balance? : NO    Timed Up and Go (TUG): 6 seconds   (>= 12 seconds indicates high risk for falling)    Observable abnormalities included:   Normal balance and gait pattern    C. Assessment of Cognitive Function:    Mini-Cog Test:     1) Registration (3 objects): YES   2) Clock Draw: Passed? : Yes   3) Number of objects recalled: 3 (MA)     FURTHER EVALUATION REQUIRED?:   No    **OVERALL ASSESSMENT OF FUNCTIONAL ABILITY**  (Assessment of ability to perform ADL's (showering/bathing, using toilet, dressing, feeding self, moving self around) and IADL's (use telephone, shop, prepare food, housekeep, do laundry, transport independently, take medications independently, and handle finances)    DEGREE OF FUNCTIONAL IMPAIRMENT:   NONE (based on assessment noted above)    ABILITY TO LIVE INDEPENDENTLY:   Capable of living independently     COUNSELING     A. Identification of Health Risk Factors:    Risk factors include:   cardiovascular risk factors    B. Age-Appropriate Screening Schedule:  (Refer to the list below for future screening recommendations based on patient's age, sex and/or medical conditions. Orders for these recommended tests are listed in the plan section. The patient has been provided with a written plan)    Health Maintenance Topics  Health Maintenance   Topic Date Due    ZOSTER VACCINE (2 of 2) 10/27/2020    MAMMOGRAM  01/03/2024    COVID-19 Vaccine (5 - 2024-25 season) 09/01/2024    ANNUAL WELLNESS VISIT  12/18/2024    LIPID PANEL  12/18/2024    DXA SCAN  09/24/2026    TDAP/TD VACCINES (3 - Td or Tdap) 01/28/2029    COLORECTAL CANCER SCREENING  06/06/2029     HEPATITIS C SCREENING  Completed    INFLUENZA VACCINE  Completed    Pneumococcal Vaccine 65+  Completed    PAP SMEAR  Discontinued       Health Maintenance Topics Due or Over-Due  Health Maintenance Due   Topic Date Due    ZOSTER VACCINE (2 of 2) 10/27/2020    MAMMOGRAM  01/03/2024    COVID-19 Vaccine (5 - 2024-25 season) 09/01/2024    ANNUAL WELLNESS VISIT  12/18/2024    LIPID PANEL  12/18/2024         C. Advanced Care Planning:    Advance Care Planning   ACP discussion was held with the patient during this visit. Patient does not have an advance directive, declines further assistance.       D. Patient Self-Management and Personalized Health Advice:    She has been provided with PERSONALIZED COUNSELING/INFORMATION (AVS educational information) about:     optimizing diet/nutrition plans  improving exercise / conditioning  reducing risk for cardiovascular disease (heart, stroke, vascular)      She has been recommended for the following PREVENTATIVE SERVICES which has been performed today, will be ordered today or ordered/performed on upcoming follow-up visit:     LIFESTYLE PREVENTATIVE MEASURES  NUTRITION counseling provided  EXERCISE counseling provided    CARDIOVASCULAR SCREENING  Lipid panel     CANCER SCREENING  COLORECTAL cancer: Colonoscopy/Cologuard discussed   BREAST CANCER screening discussed and mammogram every 1-2 years recommended    MISC SCREENING  OSTEOPOROSIS screening DISCUSSED    VACCINATION/IMMUNIZATION  COVID-19 vaccination discussed/recommended  SHINGRIX administered/recommended/discussed (50+)      E. Miscellaneous Items: [id number]    Aspirin use counseling: Does not need ASA (and currently is not on it)    Discussed BMI with her. The BMI is in the acceptable range    Reviewed use of high risk medication in the elderly: YES    Reviewed for potential of harmful drug interactions in the elderly: YES      WRAP UP     ASSESSMENT & PLAN:    1) MEDICARE ANNUAL WELLNESS VISIT    2) OTHER MEDICAL  CONDITIONS ADDRESSED TODAY:            Problem List Items Addressed This Visit       Hypothyroidism (Chronic)    Overview     *Name brand Synthroid only    Continue Synthroid 100 mcg         Relevant Medications    levothyroxine (SYNTHROID, LEVOTHROID) 100 MCG tablet    Other Relevant Orders    Lipid Panel With / Chol / HDL Ratio    CBC & Differential    TSH+Free T4    Urinalysis With Culture If Indicated - Urine, Clean Catch    Other specified anxiety disorders (Chronic)    Overview     Has been on lorazepam taken as needed for episodic anxiety         Relevant Medications    LORazepam (ATIVAN) 0.5 MG tablet    Hyperlipidemia (Chronic)    Overview     Maintain low saturated fat/cholesterol diet.           Relevant Orders    Comprehensive Metabolic Panel    Lipid Panel With / Chol / HDL Ratio    Urinalysis With Culture If Indicated - Urine, Clean Catch     Other Visit Diagnoses       Encounter for annual wellness visit    -  Primary    High risk medication use        Relevant Orders    Compliance Drug Analysis, Ur - Urine, Clean Catch                    Orders Placed This Encounter   Procedures    Comprehensive Metabolic Panel     Order Specific Question:   Release to patient     Answer:   Routine Release [[id number]]    Lipid Panel With / Chol / HDL Ratio     Order Specific Question:   Release to patient     Answer:   Routine Release [[id number]]    TSH+Free T4     Order Specific Question:   Release to patient     Answer:   Routine Release [[id number]]    Urinalysis With Culture If Indicated - Urine, Clean Catch     Order Specific Question:   Release to patient     Answer:   Routine Release [[id number]]    Compliance Drug Analysis, Ur - Urine, Clean Catch     Order Specific Question:   Release to patient     Answer:   Routine Release [[id number]]    CBC & Differential     Order Specific Question:   Manual Differential     Answer:   No     Order Specific Question:   Release to patient     Answer:   Routine Release  [[id number]]        Discussion / Summary:  Leonardo reviewed, controlled substance contract signed and drug screen ordered for Ativan.  Check thyroid levels for hypothyroidism  Overdue on mammogram, she will call her OB/GYN  Up-to-date on colonoscopy and DEXA scan.  Recommend updated COVID-19 and Shingrix second shot at the pharmacy follow-up with Dr. Hallman in 6 months for chronic medical, earlier if needed    Medications as of TODAY:              Current Outpatient Medications   Medication Sig Dispense Refill    acetaminophen (TYLENOL) 500 MG tablet Take 1 tablet by mouth Every 8 (Eight) Hours As Needed for Mild Pain  or Moderate Pain .      levothyroxine (SYNTHROID, LEVOTHROID) 100 MCG tablet Take 1 tablet by mouth Daily. 90 tablet 1    LORazepam (ATIVAN) 0.5 MG tablet Take 1 tablet by mouth Daily As Needed for Anxiety. 30 tablet 2     No current facility-administered medications for this visit.         FOLLOW-UP:            Return in about 6 months (around 7/13/2025) for Next scheduled follow up.              Future Appointments   Date Time Provider Department Center   7/22/2025 11:00 AM Darrian Hallman MD [facility]  [facility]         After Visit Summary (AVS) including the Personalized Prevention  Plan Services (PPPS) was either printed and given to the patient at check-out today and/or sent to [facility] for review.

## 2025-01-14 LAB
ALBUMIN SERPL-MCNC: 4.4 G/DL (ref 3.5–5.2)
ALBUMIN/GLOB SERPL: 1.6 G/DL
ALP SERPL-CCNC: 54 U/L (ref 39–117)
ALT SERPL-CCNC: 19 U/L (ref 1–33)
APPEARANCE UR: CLEAR
AST SERPL-CCNC: 23 U/L (ref 1–32)
BACTERIA #/AREA URNS HPF: NORMAL /[HPF]
BASOPHILS # BLD AUTO: 0.08 10*3/MM3 (ref 0–0.2)
BASOPHILS NFR BLD AUTO: 1 % (ref 0–1.5)
BILIRUB SERPL-MCNC: 0.5 MG/DL (ref 0–1.2)
BILIRUB UR QL STRIP: NEGATIVE
BUN SERPL-MCNC: 15 MG/DL (ref 8–23)
BUN/CREAT SERPL: 21.4 (ref 7–25)
CALCIUM SERPL-MCNC: 9.7 MG/DL (ref 8.6–10.5)
CASTS URNS QL MICRO: NORMAL /LPF
CHLORIDE SERPL-SCNC: 103 MMOL/L (ref 98–107)
CHOLEST SERPL-MCNC: 231 MG/DL (ref 0–200)
CHOLEST/HDLC SERPL: 3.16 {RATIO}
CO2 SERPL-SCNC: 25.9 MMOL/L (ref 22–29)
COLOR UR: YELLOW
CREAT SERPL-MCNC: 0.7 MG/DL (ref 0.57–1)
EGFRCR SERPLBLD CKD-EPI 2021: 93.8 ML/MIN/1.73
EOSINOPHIL # BLD AUTO: 0.36 10*3/MM3 (ref 0–0.4)
EOSINOPHIL NFR BLD AUTO: 4.5 % (ref 0.3–6.2)
EPI CELLS #/AREA URNS HPF: NORMAL /HPF (ref 0–10)
ERYTHROCYTE [DISTWIDTH] IN BLOOD BY AUTOMATED COUNT: 12.1 % (ref 12.3–15.4)
GLOBULIN SER CALC-MCNC: 2.7 GM/DL
GLUCOSE SERPL-MCNC: 91 MG/DL (ref 65–99)
GLUCOSE UR QL STRIP: NEGATIVE
HCT VFR BLD AUTO: 40.9 % (ref 34–46.6)
HDLC SERPL-MCNC: 73 MG/DL (ref 40–60)
HGB BLD-MCNC: 14 G/DL (ref 12–15.9)
HGB UR QL STRIP: NEGATIVE
IMM GRANULOCYTES # BLD AUTO: 0.02 10*3/MM3 (ref 0–0.05)
IMM GRANULOCYTES NFR BLD AUTO: 0.2 % (ref 0–0.5)
KETONES UR QL STRIP: NEGATIVE
LDLC SERPL CALC-MCNC: 136 MG/DL (ref 0–100)
LEUKOCYTE ESTERASE UR QL STRIP: NEGATIVE
LYMPHOCYTES # BLD AUTO: 2.79 10*3/MM3 (ref 0.7–3.1)
LYMPHOCYTES NFR BLD AUTO: 34.8 % (ref 19.6–45.3)
MCH RBC QN AUTO: 31.5 PG (ref 26.6–33)
MCHC RBC AUTO-ENTMCNC: 34.2 G/DL (ref 31.5–35.7)
MCV RBC AUTO: 92.1 FL (ref 79–97)
MICRO URNS: NORMAL
MICRO URNS: NORMAL
MONOCYTES # BLD AUTO: 0.49 10*3/MM3 (ref 0.1–0.9)
MONOCYTES NFR BLD AUTO: 6.1 % (ref 5–12)
NEUTROPHILS # BLD AUTO: 4.27 10*3/MM3 (ref 1.7–7)
NEUTROPHILS NFR BLD AUTO: 53.4 % (ref 42.7–76)
NITRITE UR QL STRIP: NEGATIVE
NRBC BLD AUTO-RTO: 0 /100 WBC (ref 0–0.2)
PH UR STRIP: 6 [PH] (ref 5–7.5)
PLATELET # BLD AUTO: 258 10*3/MM3 (ref 140–450)
POTASSIUM SERPL-SCNC: 4.2 MMOL/L (ref 3.5–5.2)
PROT SERPL-MCNC: 7.1 G/DL (ref 6–8.5)
PROT UR QL STRIP: NEGATIVE
RBC # BLD AUTO: 4.44 10*6/MM3 (ref 3.77–5.28)
RBC #/AREA URNS HPF: NORMAL /HPF (ref 0–2)
SODIUM SERPL-SCNC: 139 MMOL/L (ref 136–145)
SP GR UR STRIP: 1.01 (ref 1–1.03)
T4 FREE SERPL-MCNC: 1.29 NG/DL (ref 0.92–1.68)
TRIGL SERPL-MCNC: 127 MG/DL (ref 0–150)
TSH SERPL DL<=0.005 MIU/L-ACNC: 2.16 UIU/ML (ref 0.27–4.2)
URINALYSIS REFLEX: NORMAL
UROBILINOGEN UR STRIP-MCNC: 0.2 MG/DL (ref 0.2–1)
VLDLC SERPL CALC-MCNC: 22 MG/DL (ref 5–40)
WBC # BLD AUTO: 8.01 10*3/MM3 (ref 3.4–10.8)
WBC #/AREA URNS HPF: NORMAL /HPF (ref 0–5)

## 2025-01-20 DIAGNOSIS — F41.8 OTHER SPECIFIED ANXIETY DISORDERS: Chronic | ICD-10-CM

## 2025-01-20 RX ORDER — LORAZEPAM 0.5 MG/1
0.5 TABLET ORAL DAILY PRN
Qty: 30 TABLET | Refills: 0 | Status: SHIPPED | OUTPATIENT
Start: 2025-01-20

## 2025-01-20 NOTE — TELEPHONE ENCOUNTER
Caller: Gwendolyn Huddleston    Relationship: Self    Best call back number: [phone]     Requested Prescriptions:   Requested Prescriptions     Pending Prescriptions Disp Refills    LORazepam (ATIVAN) 0.5 MG tablet 30 tablet 2     Sig: Take 1 tablet by mouth Daily As Needed for Anxiety.        Pharmacy where request should be sent: [facility] [id number] [place] [id number] [facility] [address] [facility] [phone] [facility] [phone] FX     Last office visit with prescribing clinician: 12/18/2023   Last telemedicine visit with prescribing clinician: Visit date not found   Next office visit with prescribing clinician: 7/22/2025       Would you like a call back once the refill request has been completed: [x] Yes [] No    If the office needs to give you a call back, can they leave a voicemail: [x] Yes [] No    Nahum Hector Rep   01/20/25 09:02 EST

## 2025-01-22 NOTE — TELEPHONE ENCOUNTER
Pt called again regarding refill and said that she needs this to be refilled today.     Please give her a call as well.

## 2025-01-24 ENCOUNTER — OFFICE VISIT (OUTPATIENT)
Dept: INTERNAL MEDICINE | Age: 70
End: 2025-01-24
Payer: MEDICARE

## 2025-01-24 VITALS
WEIGHT: 140 LBS | SYSTOLIC BLOOD PRESSURE: 122 MMHG | HEART RATE: 67 BPM | TEMPERATURE: 97.8 F | DIASTOLIC BLOOD PRESSURE: 72 MMHG | BODY MASS INDEX: 24.8 KG/M2 | HEIGHT: 63 IN | OXYGEN SATURATION: 95 %

## 2025-01-24 DIAGNOSIS — F41.8 OTHER SPECIFIED ANXIETY DISORDERS: Chronic | ICD-10-CM

## 2025-01-24 DIAGNOSIS — R51.9 NEW ONSET OF HEADACHES AFTER AGE 50: Primary | ICD-10-CM

## 2025-01-24 PROCEDURE — 99213 OFFICE O/P EST LOW 20 MIN: CPT | Performed by: PHYSICIAN ASSISTANT

## 2025-01-24 PROCEDURE — G2211 COMPLEX E/M VISIT ADD ON: HCPCS | Performed by: PHYSICIAN ASSISTANT

## 2025-01-24 PROCEDURE — 1125F AMNT PAIN NOTED PAIN PRSNT: CPT | Performed by: PHYSICIAN ASSISTANT

## 2025-01-24 RX ORDER — LORAZEPAM 0.5 MG/1
0.5 TABLET ORAL DAILY PRN
Qty: 30 TABLET | Refills: 2 | Status: CANCELLED | OUTPATIENT
Start: 2025-01-24

## 2025-01-24 NOTE — PROGRESS NOTES
STORMY CARMONA PA-C                  I  N  T  E  R  N  A  L    M  E  D  I  C  I  N  E         ENCOUNTER DATE:  01/24/2025    Gwendolyn Huddleston / 69 y.o. / female    OFFICE VISIT ENCOUNTER       CHIEF COMPLAINT / REASON FOR OFFICE VISIT     Headache (lingering)      ASSESSMENT & PLAN     Problem List Items Addressed This Visit    None  Visit Diagnoses       New onset of headaches after age 50    -  Primary    Relevant Orders    POCT SARS-CoV-2 Antigen SANTOS    CT Head With & Without Contrast          Orders Placed This Encounter   Procedures    CT Head With & Without Contrast     Standing Status:   Future     Standing Expiration Date:   1/24/2026     Order Specific Question:   Does the patient require sedation?     Answer:   No     Order Specific Question:   STEREOTACTIC GUIDANCE PROTOCOL?     Answer:   No [0]     Order Specific Question:   Will fiducial markers be needed for this procedure?     Answer:   No     Order Specific Question:   Release to patient     Answer:   Routine Release [[id number]]     Order Specific Question:   Reason for Exam:     Answer:   New onset of daily headaches following head trauma on 2 weeks ago    POCT SARS-CoV-2 Antigen SANTOS     Order Specific Question:   Release to patient     Answer:   Routine Release [[id number]]     No orders of the defined types were placed in this encounter.      SUMMARY/DISCUSSION  New onset daily headache following head trauma x 2 weeks.  CT with and without contrast ordered.  Continue Tylenol as needed.  Anxiety: Reports needing to use Ativan more frequently due to current stressors on this past week.  She does not need a refill on today but will as soon as his bottle is completed. UDS updated 8/8/2024, and controlled substance consent form updated 1/13/2025.        Next Appointment:     Return if symptoms worsen or fail to improve.  And, for Follow-up with Dr. Hallman as scheduled in July.      VITAL SIGNS     Vitals:    01/24/25 1029  "  BP: 122/72   Pulse: 67   Temp: 97.8 °F (36.6 °C)   SpO2: 95%   Weight: 63.5 kg (140 lb)   Height: 160 cm (63\")       BP Readings from Last 3 Encounters:   01/24/25 122/72   01/13/25 128/76   08/08/24 100/78     Wt Readings from Last 3 Encounters:   01/24/25 63.5 kg (140 lb)   01/13/25 63.5 kg (140 lb)   08/08/24 62.1 kg (137 lb)     Body mass index is 24.8 kg/m².         No data to display                   MEDICATIONS AT THE TIME OF OFFICE VISIT     Current Outpatient Medications on File Prior to Visit   Medication Sig    acetaminophen (TYLENOL) 500 MG tablet Take 1 tablet by mouth Every 8 (Eight) Hours As Needed for Mild Pain  or Moderate Pain .    levothyroxine (SYNTHROID, LEVOTHROID) 100 MCG tablet Take 1 tablet by mouth Daily.    LORazepam (ATIVAN) 0.5 MG tablet Take 1 tablet by mouth Daily As Needed for Anxiety.     No current facility-administered medications on file prior to visit.          HISTORY OF PRESENT ILLNESS     Headaches: She presents with complaint of daily headaches since hitting her heat against the microwave door on 1/12/25. At the time of the trauma she had a small area of bleeding but denies nausea, vomiting, dizziness, visual changes, or loss of consciousness. The area of headaches have shifted from day-to-day. She does not traditionally experience headaches. She has attempted daily Tylenol, which does help. She is up to date with eye examinations, particularly now that she has undergone multifocal lens implantation within the last several months.     Anxiety: Takes Lorazepam 0.5 mg as needed, and denies side effects or misuse.  She admits recently needing to take her medication on a more regular basis due to life stressors.  Does not need refill on today, but will after this current script is completed.  UDS updated 8/8/2024, and controlled substance consent form updated 1/13/2025.    Patient Care Team:  Darrian Hallman MD as PCP - General (Internal Medicine)  Mariam Machado MD as " Consulting Physician (Dermatology)  Niranjan Ramos MD as Consulting Physician (Obstetrics and Gynecology)    REVIEW OF SYSTEMS     Review of Systems   As Per HPI.  All other systems negative.     PHYSICAL EXAMINATION     Physical Exam  Vitals and nursing note reviewed.   Constitutional:       General: She is not in acute distress.     Appearance: Normal appearance. She is not ill-appearing.   Eyes:      General: No visual field deficit.  Cardiovascular:      Rate and Rhythm: Normal rate and regular rhythm.      Pulses: Normal pulses.      Heart sounds: Normal heart sounds. No murmur heard.     No friction rub. No gallop.   Pulmonary:      Effort: Pulmonary effort is normal. No respiratory distress.      Breath sounds: Normal breath sounds. No stridor. No wheezing.   Neurological:      Mental Status: She is alert.      Cranial Nerves: No cranial nerve deficit or facial asymmetry.      Sensory: Sensation is intact.      Motor: Motor function is intact.      Coordination: Coordination is intact.      Gait: Gait is intact.   Psychiatric:         Mood and Affect: Mood normal.         Behavior: Behavior normal.             REVIEWED DATA     Labs:     Lab Results   Component Value Date     01/13/2025    K 4.2 01/13/2025    CALCIUM 9.7 01/13/2025    AST 23 01/13/2025    ALT 19 01/13/2025    BUN 15 01/13/2025    CREATININE 0.70 01/13/2025    CREATININE 0.63 12/18/2023    CREATININE 0.61 11/30/2022    EGFRRESULT 93.8 01/13/2025     Lab Results   Component Value Date    HGBA1C 5.46 01/21/2019     Lab Results   Component Value Date     (H) 01/13/2025     (H) 12/18/2023     (H) 11/30/2022    HDL 73 (H) 01/13/2025    HDL 71 12/18/2023    TRIG 127 01/13/2025    TRIG 106 12/18/2023     Lab Results   Component Value Date    TSH 2.160 01/13/2025    TSH 0.942 08/08/2024    TSH 0.788 12/18/2023    FREET4 1.29 01/13/2025    FREET4 1.53 08/08/2024    FREET4 1.67 12/18/2023     Lab Results   Component Value Date  "   WBC 8.01 01/13/2025    HGB 14.0 01/13/2025     01/13/2025   No results found for: \"MALBCRERATIO\"          Imaging:               Medical Tests:               Summary of old records / correspondence / consultant report:             Request outside records:       "

## 2025-01-30 ENCOUNTER — HOSPITAL ENCOUNTER (OUTPATIENT)
Facility: HOSPITAL | Age: 70
Discharge: HOME | End: 2025-01-30
Admitting: PHYSICIAN ASSISTANT
Payer: MEDICARE

## 2025-01-30 DIAGNOSIS — R51.9 NEW ONSET OF HEADACHES AFTER AGE 50: ICD-10-CM

## 2025-01-30 PROCEDURE — 70470 CT HEAD/BRAIN W/O & W/DYE: CPT

## 2025-01-30 PROCEDURE — 25510000001 IOPAMIDOL PER 1 ML: Performed by: PHYSICIAN ASSISTANT

## 2025-01-30 RX ORDER — IOPAMIDOL 755 MG/ML
100 INJECTION, SOLUTION INTRAVASCULAR
Status: COMPLETED | OUTPATIENT
Start: 2025-01-30 | End: 2025-01-30

## 2025-01-30 RX ORDER — IOPAMIDOL 612 MG/ML
100 INJECTION, SOLUTION INTRAVASCULAR
Status: DISCONTINUED | OUTPATIENT
Start: 2025-01-30 | End: 2025-01-30

## 2025-01-30 RX ADMIN — IOPAMIDOL 50 ML: 755 INJECTION, SOLUTION INTRAVENOUS at 11:27

## 2025-01-31 DIAGNOSIS — E03.9 HYPOTHYROIDISM, UNSPECIFIED TYPE: Chronic | ICD-10-CM

## 2025-01-31 DIAGNOSIS — Q28.3 CEREBRAL CAVERNOUS MALFORMATION: ICD-10-CM

## 2025-01-31 DIAGNOSIS — R51.9 NEW ONSET OF HEADACHES AFTER AGE 50: Primary | ICD-10-CM

## 2025-01-31 RX ORDER — LEVOTHYROXINE SODIUM 100 UG/1
100 TABLET ORAL DAILY
Qty: 90 TABLET | Refills: 1 | Status: SHIPPED | OUTPATIENT
Start: 2025-01-31

## 2025-01-31 NOTE — PROGRESS NOTES
*Please call patient with results, even if patient has already accessed results via Epic.   Make sure results are communicated directly with the patient, or patient proxy (if a verified 'Release of Information' is on file).         CT scan of your head with and without contrast does not demonstrate any acute changes or bleeding.    There is a 5 mm sized finding consistent with a calcium deposit or small cluster of blood vessels.  I will order a brain and neck MRI to inspect this further.    There is also a 8 x 5 mm chronic infarct (a small area where the brain tissue has previously ) which is usually calls from a past stroke.    You should expect to be contacted by imaging to schedule the brain and neck MRIs.

## 2025-02-03 ENCOUNTER — TELEPHONE (OUTPATIENT)
Dept: INTERNAL MEDICINE | Age: 70
End: 2025-02-03

## 2025-02-03 NOTE — TELEPHONE ENCOUNTER
Caller: Gwendolyn Huddleston    Relationship to patient: Self    Best call back number: [phone]     Patient is needing: PATIENT IS REQUESTING A CALLBACK REGARDING THE CT SCAN RESULT FROM 1.24, PLEASE CALL TO DISCUSS AT EARLIEST CONVENIENCE.

## 2025-02-06 ENCOUNTER — HOSPITAL ENCOUNTER (OUTPATIENT)
Dept: MRI IMAGING | Facility: HOSPITAL | Age: 70
Discharge: HOME | End: 2025-02-06
Payer: MEDICARE

## 2025-02-06 DIAGNOSIS — Q28.3 CEREBRAL CAVERNOUS MALFORMATION: ICD-10-CM

## 2025-02-06 DIAGNOSIS — R51.9 NEW ONSET OF HEADACHES AFTER AGE 50: ICD-10-CM

## 2025-02-06 PROCEDURE — 70547 MR ANGIOGRAPHY NECK W/O DYE: CPT

## 2025-02-06 PROCEDURE — 70544 MR ANGIOGRAPHY HEAD W/O DYE: CPT

## 2025-02-07 ENCOUNTER — TELEPHONE (OUTPATIENT)
Dept: INTERNAL MEDICINE | Age: 70
End: 2025-02-07

## 2025-02-07 NOTE — TELEPHONE ENCOUNTER
Caller: Gwendolyn Huddleston    Relationship to patient: Self    Best call back number: [phone]     Patient is needing:   PATIENT IS REQUESTING A CALLBACK REGARDING HER RECENT MRI FROM 2.6.25 TO DISCUSS RESULTS WHEN THEY RELEASE.

## 2025-02-09 DIAGNOSIS — Q28.3 CEREBRAL CAVERNOUS MALFORMATION: ICD-10-CM

## 2025-02-09 DIAGNOSIS — R51.9 NEW ONSET OF HEADACHES AFTER AGE 50: Primary | ICD-10-CM

## 2025-04-01 DIAGNOSIS — Z51.81 THERAPEUTIC DRUG MONITORING: Primary | ICD-10-CM

## 2025-04-05 LAB — DRUGS UR: NORMAL

## 2025-04-10 DIAGNOSIS — F41.8 OTHER SPECIFIED ANXIETY DISORDERS: Chronic | ICD-10-CM

## 2025-04-11 RX ORDER — LORAZEPAM 0.5 MG/1
0.5 TABLET ORAL DAILY PRN
Qty: 30 TABLET | Refills: 2 | Status: SHIPPED | OUTPATIENT
Start: 2025-04-11

## 2025-07-30 DIAGNOSIS — E03.9 HYPOTHYROIDISM, UNSPECIFIED TYPE: Chronic | ICD-10-CM

## 2025-07-30 RX ORDER — LEVOTHYROXINE SODIUM 100 UG/1
100 TABLET ORAL DAILY
Qty: 90 TABLET | Refills: 1 | OUTPATIENT
Start: 2025-07-30

## 2025-08-08 ENCOUNTER — OFFICE VISIT (OUTPATIENT)
Dept: INTERNAL MEDICINE | Age: 70
End: 2025-08-08
Payer: MEDICARE

## 2025-08-08 VITALS
TEMPERATURE: 96.9 F | DIASTOLIC BLOOD PRESSURE: 82 MMHG | HEIGHT: 63 IN | SYSTOLIC BLOOD PRESSURE: 116 MMHG | WEIGHT: 133 LBS | OXYGEN SATURATION: 100 % | BODY MASS INDEX: 23.57 KG/M2 | HEART RATE: 71 BPM

## 2025-08-08 DIAGNOSIS — E03.9 HYPOTHYROIDISM, UNSPECIFIED TYPE: Primary | Chronic | ICD-10-CM

## 2025-08-08 DIAGNOSIS — F41.8 OTHER SPECIFIED ANXIETY DISORDERS: Chronic | ICD-10-CM

## 2025-08-08 DIAGNOSIS — E78.00 PURE HYPERCHOLESTEROLEMIA: Chronic | ICD-10-CM

## 2025-08-08 LAB
CHOLEST SERPL-MCNC: 221 MG/DL (ref 0–200)
CHOLEST/HDLC SERPL: 3.35 {RATIO}
HDLC SERPL-MCNC: 66 MG/DL (ref 40–60)
LDLC SERPL CALC-MCNC: 143 MG/DL (ref 0–100)
T4 FREE SERPL-MCNC: 1.3 NG/DL (ref 0.92–1.68)
TRIGL SERPL-MCNC: 69 MG/DL (ref 0–150)
TSH SERPL DL<=0.005 MIU/L-ACNC: 1.6 UIU/ML (ref 0.27–4.2)
VLDLC SERPL CALC-MCNC: 12 MG/DL (ref 5–40)

## 2025-08-11 ENCOUNTER — RESULTS FOLLOW-UP (OUTPATIENT)
Dept: INTERNAL MEDICINE | Age: 70
End: 2025-08-11
Payer: MEDICARE

## 2025-08-11 DIAGNOSIS — E03.9 HYPOTHYROIDISM, UNSPECIFIED TYPE: Chronic | ICD-10-CM

## 2025-08-12 RX ORDER — LEVOTHYROXINE SODIUM 100 UG/1
100 TABLET ORAL DAILY
Qty: 90 TABLET | Refills: 1 | Status: SHIPPED | OUTPATIENT
Start: 2025-08-12

## (undated) DEVICE — LOU LAP CHOLE: Brand: MEDLINE INDUSTRIES, INC.

## (undated) DEVICE — TUBING, SUCTION, 1/4" X 10', STRAIGHT: Brand: MEDLINE

## (undated) DEVICE — CONTAINER,SPECIMEN,OR STERILE,4OZ: Brand: MEDLINE

## (undated) DEVICE — CANN O2 ETCO2 FITS ALL CONN CO2 SMPL A/ 7IN DISP LF

## (undated) DEVICE — EXTENSION SET, MALE LUER LOCK ADAPTER WITH RETRACTABLE COLLAR

## (undated) DEVICE — SOL NACL 0.9PCT 1000ML

## (undated) DEVICE — ENDOPATH XCEL BLUNT TIP TROCARS WITH SMOOTH SLEEVES: Brand: ENDOPATH XCEL

## (undated) DEVICE — Device: Brand: DEFENDO AIR/WATER/SUCTION AND BIOPSY VALVE

## (undated) DEVICE — CANN NASL CO2 TRULINK W/O2 A/

## (undated) DEVICE — STPCK 3WY D201 DISCOFIX

## (undated) DEVICE — GLV SURG SENSICARE GREEN W/ALOE PF LF 6.5 STRL

## (undated) DEVICE — ADHS SKIN DERMABOND TOP ADVANCED

## (undated) DEVICE — ERBE NESSY®PLATE 170 SPLIT; 168CM²; CABLE 3M: Brand: ERBE

## (undated) DEVICE — GLV SURG BIOGEL LTX PF 6

## (undated) DEVICE — ENDOPOUCH RETRIEVER SPECIMEN RETRIEVAL BAGS: Brand: ENDOPOUCH RETRIEVER

## (undated) DEVICE — ANTIBACTERIAL UNDYED BRAIDED (POLYGLACTIN 910), SYNTHETIC ABSORBABLE SUTURE: Brand: COATED VICRYL

## (undated) DEVICE — VISUALIZATION SYSTEM: Brand: CLEARIFY

## (undated) DEVICE — BITEBLOCK OMNI BLOC

## (undated) DEVICE — GOWN,SIRUS,NON REINFRCD,LARGE,SET IN SL: Brand: MEDLINE

## (undated) DEVICE — DEV LK WIREGUIDE FUSN OLYMP SCP

## (undated) DEVICE — APPL CHLORAPREP W/TINT 26ML ORNG

## (undated) DEVICE — SENSR O2 OXIMAX FNGR A/ 18IN NONSTR

## (undated) DEVICE — ENDOCUT SCISSOR TIP, DISPOSABLE: Brand: RENEW

## (undated) DEVICE — ADAPT CLN BIOGUARD AIR/H2O DISP

## (undated) DEVICE — DRP C/ARM 41X74IN

## (undated) DEVICE — RETRIEVAL BALLOON CATHETER: Brand: EXTRACTOR™ PRO RX

## (undated) DEVICE — SPHINCTEROTOME: Brand: HYDRATOME RX 44

## (undated) DEVICE — SUT VIC 0 UR6 27IN VCP603H

## (undated) DEVICE — DRAPE,REIN 53X77,STERILE: Brand: MEDLINE

## (undated) DEVICE — CATH IV INSYTE AUTOGARD 14G 1 1/2IN ORNG

## (undated) DEVICE — ENDOPATH XCEL UNIVERSAL TROCAR STABLILITY SLEEVES: Brand: ENDOPATH XCEL

## (undated) DEVICE — CATH CHOLANG 4.5F18IN BRGNDY

## (undated) DEVICE — GOWN ,SIRUS,NONREINFORCED SMALL: Brand: MEDLINE

## (undated) DEVICE — KT ORCA ORCAPOD DISP STRL

## (undated) DEVICE — ENDOPATH XCEL BLADELESS TROCARS WITH STABILITY SLEEVES: Brand: ENDOPATH XCEL

## (undated) DEVICE — THE TORRENT IRRIGATION SCOPE CONNECTOR IS USED WITH THE TORRENT IRRIGATION TUBING TO PROVIDE IRRIGATION FLUIDS SUCH AS STERILE WATER DURING GASTROINTESTINAL ENDOSCOPIC PROCEDURES WHEN USED IN CONJUNCTION WITH AN IRRIGATION PUMP (OR ELECTROSURGICAL UNIT).: Brand: TORRENT